# Patient Record
Sex: FEMALE | Race: WHITE | Employment: OTHER | URBAN - METROPOLITAN AREA
[De-identification: names, ages, dates, MRNs, and addresses within clinical notes are randomized per-mention and may not be internally consistent; named-entity substitution may affect disease eponyms.]

---

## 2017-12-18 PROBLEM — I95.1 ORTHOSTATIC HYPOTENSION: Status: ACTIVE | Noted: 2017-12-18

## 2017-12-19 PROBLEM — E11.42 DIABETIC POLYNEUROPATHY ASSOCIATED WITH TYPE 2 DIABETES MELLITUS (HCC): Status: ACTIVE | Noted: 2017-06-19

## 2017-12-19 PROBLEM — C44.92 CANCER, SKIN, SQUAMOUS CELL: Status: ACTIVE | Noted: 2017-12-19
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
## 2017-12-19 PROBLEM — E11.42 DIABETIC POLYNEUROPATHY ASSOCIATED WITH TYPE 2 DIABETES MELLITUS (HCC): Chronic | Status: ACTIVE | Noted: 2017-06-19

## 2017-12-19 PROBLEM — M50.30 DDD (DEGENERATIVE DISC DISEASE), CERVICAL: Status: ACTIVE | Noted: 2017-12-19
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
## 2017-12-19 PROBLEM — R55 SYNCOPE: Status: ACTIVE | Noted: 2017-12-19

## 2017-12-19 PROBLEM — J40 BRONCHITIS: Status: ACTIVE | Noted: 2017-12-19

## 2017-12-21 PROBLEM — I95.1 ORTHOSTATIC HYPOTENSION: Status: RESOLVED | Noted: 2017-12-18 | Resolved: 2017-12-21
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
## 2017-12-21 PROBLEM — J40 BRONCHITIS: Status: RESOLVED | Noted: 2017-12-19 | Resolved: 2017-12-21
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
## 2017-12-21 PROBLEM — R55 SYNCOPE: Status: RESOLVED | Noted: 2017-12-19 | Resolved: 2017-12-21

## 2022-07-14 ENCOUNTER — APPOINTMENT (OUTPATIENT)
Dept: CT IMAGING | Age: 87
DRG: 690 | End: 2022-07-14
Payer: MEDICARE

## 2022-07-14 ENCOUNTER — HOSPITAL ENCOUNTER (INPATIENT)
Age: 87
LOS: 3 days | Discharge: NURSING, REHAB OR LONG-TERM CARE | DRG: 690 | End: 2022-07-20
Attending: STUDENT IN AN ORGANIZED HEALTH CARE EDUCATION/TRAINING PROGRAM | Admitting: INTERNAL MEDICINE
Payer: MEDICARE

## 2022-07-14 DIAGNOSIS — E11.42 DIABETIC POLYNEUROPATHY ASSOCIATED WITH TYPE 2 DIABETES MELLITUS (HCC): Chronic | ICD-10-CM

## 2022-07-14 DIAGNOSIS — S09.90XA INJURY OF HEAD, INITIAL ENCOUNTER: Primary | ICD-10-CM

## 2022-07-14 LAB
ANION GAP SERPL CALCULATED.3IONS-SCNC: 11 MMOL/L (ref 3–16)
BASOPHILS ABSOLUTE: 0 K/UL (ref 0–0.2)
BASOPHILS RELATIVE PERCENT: 0.5 %
BUN BLDV-MCNC: 10 MG/DL (ref 7–20)
CALCIUM SERPL-MCNC: 10 MG/DL (ref 8.3–10.6)
CHLORIDE BLD-SCNC: 93 MMOL/L (ref 99–110)
CO2: 27 MMOL/L (ref 21–32)
CREAT SERPL-MCNC: <0.5 MG/DL (ref 0.6–1.2)
EOSINOPHILS ABSOLUTE: 0.1 K/UL (ref 0–0.6)
EOSINOPHILS RELATIVE PERCENT: 0.9 %
GFR AFRICAN AMERICAN: >60
GFR NON-AFRICAN AMERICAN: >60
GLUCOSE BLD-MCNC: 243 MG/DL (ref 70–99)
HCT VFR BLD CALC: 38.1 % (ref 36–48)
HEMOGLOBIN: 13.3 G/DL (ref 12–16)
LYMPHOCYTES ABSOLUTE: 1.2 K/UL (ref 1–5.1)
LYMPHOCYTES RELATIVE PERCENT: 19.2 %
MCH RBC QN AUTO: 32.8 PG (ref 26–34)
MCHC RBC AUTO-ENTMCNC: 34.9 G/DL (ref 31–36)
MCV RBC AUTO: 94.1 FL (ref 80–100)
MONOCYTES ABSOLUTE: 0.6 K/UL (ref 0–1.3)
MONOCYTES RELATIVE PERCENT: 9.7 %
NEUTROPHILS ABSOLUTE: 4.5 K/UL (ref 1.7–7.7)
NEUTROPHILS RELATIVE PERCENT: 69.7 %
PDW BLD-RTO: 13.6 % (ref 12.4–15.4)
PLATELET # BLD: 269 K/UL (ref 135–450)
PMV BLD AUTO: 7.9 FL (ref 5–10.5)
POTASSIUM REFLEX MAGNESIUM: 6 MMOL/L (ref 3.5–5.1)
RBC # BLD: 4.05 M/UL (ref 4–5.2)
SODIUM BLD-SCNC: 131 MMOL/L (ref 136–145)
WBC # BLD: 6.4 K/UL (ref 4–11)

## 2022-07-14 PROCEDURE — 70450 CT HEAD/BRAIN W/O DYE: CPT

## 2022-07-14 PROCEDURE — 81001 URINALYSIS AUTO W/SCOPE: CPT

## 2022-07-14 PROCEDURE — 93005 ELECTROCARDIOGRAM TRACING: CPT | Performed by: STUDENT IN AN ORGANIZED HEALTH CARE EDUCATION/TRAINING PROGRAM

## 2022-07-14 PROCEDURE — 90471 IMMUNIZATION ADMIN: CPT | Performed by: STUDENT IN AN ORGANIZED HEALTH CARE EDUCATION/TRAINING PROGRAM

## 2022-07-14 PROCEDURE — 6360000002 HC RX W HCPCS: Performed by: STUDENT IN AN ORGANIZED HEALTH CARE EDUCATION/TRAINING PROGRAM

## 2022-07-14 PROCEDURE — 85025 COMPLETE CBC W/AUTO DIFF WBC: CPT

## 2022-07-14 PROCEDURE — 72125 CT NECK SPINE W/O DYE: CPT

## 2022-07-14 PROCEDURE — 2500000003 HC RX 250 WO HCPCS: Performed by: STUDENT IN AN ORGANIZED HEALTH CARE EDUCATION/TRAINING PROGRAM

## 2022-07-14 PROCEDURE — 99285 EMERGENCY DEPT VISIT HI MDM: CPT

## 2022-07-14 PROCEDURE — 90715 TDAP VACCINE 7 YRS/> IM: CPT | Performed by: STUDENT IN AN ORGANIZED HEALTH CARE EDUCATION/TRAINING PROGRAM

## 2022-07-14 PROCEDURE — 80048 BASIC METABOLIC PNL TOTAL CA: CPT

## 2022-07-14 RX ADMIN — LIDOCAINE HYDROCHLORIDE 20 ML: 10; .005 INJECTION, SOLUTION EPIDURAL; INFILTRATION; INTRACAUDAL; PERINEURAL at 22:09

## 2022-07-14 RX ADMIN — TETANUS TOXOID, REDUCED DIPHTHERIA TOXOID AND ACELLULAR PERTUSSIS VACCINE, ADSORBED 0.5 ML: 5; 2.5; 8; 8; 2.5 SUSPENSION INTRAMUSCULAR at 22:09

## 2022-07-14 ASSESSMENT — PAIN - FUNCTIONAL ASSESSMENT: PAIN_FUNCTIONAL_ASSESSMENT: NONE - DENIES PAIN

## 2022-07-15 PROBLEM — N39.0 UTI (URINARY TRACT INFECTION): Status: ACTIVE | Noted: 2022-07-15

## 2022-07-15 LAB
A/G RATIO: 2.1 (ref 1.1–2.2)
ALBUMIN SERPL-MCNC: 3.7 G/DL (ref 3.4–5)
ALP BLD-CCNC: 80 U/L (ref 40–129)
ALT SERPL-CCNC: <5 U/L (ref 10–40)
ANION GAP SERPL CALCULATED.3IONS-SCNC: 10 MMOL/L (ref 3–16)
AST SERPL-CCNC: 9 U/L (ref 15–37)
BACTERIA: ABNORMAL /HPF
BASOPHILS ABSOLUTE: 0.1 K/UL (ref 0–0.2)
BASOPHILS RELATIVE PERCENT: 0.8 %
BILIRUB SERPL-MCNC: <0.2 MG/DL (ref 0–1)
BILIRUBIN URINE: NEGATIVE
BLOOD, URINE: NEGATIVE
BUN BLDV-MCNC: 11 MG/DL (ref 7–20)
CALCIUM SERPL-MCNC: 9.5 MG/DL (ref 8.3–10.6)
CHLORIDE BLD-SCNC: 98 MMOL/L (ref 99–110)
CLARITY: CLEAR
CO2: 27 MMOL/L (ref 21–32)
COLOR: YELLOW
CREAT SERPL-MCNC: <0.5 MG/DL (ref 0.6–1.2)
EKG ATRIAL RATE: 84 BPM
EKG DIAGNOSIS: NORMAL
EKG P AXIS: 71 DEGREES
EKG P-R INTERVAL: 212 MS
EKG Q-T INTERVAL: 366 MS
EKG QRS DURATION: 82 MS
EKG QTC CALCULATION (BAZETT): 432 MS
EKG R AXIS: 56 DEGREES
EKG T AXIS: 75 DEGREES
EKG VENTRICULAR RATE: 84 BPM
EOSINOPHILS ABSOLUTE: 0.1 K/UL (ref 0–0.6)
EOSINOPHILS RELATIVE PERCENT: 1.1 %
EPITHELIAL CELLS, UA: ABNORMAL /HPF (ref 0–5)
GFR AFRICAN AMERICAN: >60
GFR NON-AFRICAN AMERICAN: >60
GLUCOSE BLD-MCNC: 169 MG/DL (ref 70–99)
GLUCOSE URINE: NEGATIVE MG/DL
HCT VFR BLD CALC: 35.6 % (ref 36–48)
HEMOGLOBIN: 12.6 G/DL (ref 12–16)
KETONES, URINE: NEGATIVE MG/DL
LEUKOCYTE ESTERASE, URINE: ABNORMAL
LYMPHOCYTES ABSOLUTE: 1.8 K/UL (ref 1–5.1)
LYMPHOCYTES RELATIVE PERCENT: 20.2 %
MCH RBC QN AUTO: 32.9 PG (ref 26–34)
MCHC RBC AUTO-ENTMCNC: 35.2 G/DL (ref 31–36)
MCV RBC AUTO: 93.4 FL (ref 80–100)
MICROSCOPIC EXAMINATION: YES
MONOCYTES ABSOLUTE: 0.7 K/UL (ref 0–1.3)
MONOCYTES RELATIVE PERCENT: 8.2 %
NEUTROPHILS ABSOLUTE: 6.1 K/UL (ref 1.7–7.7)
NEUTROPHILS RELATIVE PERCENT: 69.7 %
NITRITE, URINE: NEGATIVE
PDW BLD-RTO: 13.1 % (ref 12.4–15.4)
PH UA: 6.5 (ref 5–8)
PLATELET # BLD: 226 K/UL (ref 135–450)
PMV BLD AUTO: 7.4 FL (ref 5–10.5)
POTASSIUM REFLEX MAGNESIUM: 4.4 MMOL/L (ref 3.5–5.1)
POTASSIUM REFLEX MAGNESIUM: 4.8 MMOL/L (ref 3.5–5.1)
PROTEIN UA: NEGATIVE MG/DL
RBC # BLD: 3.81 M/UL (ref 4–5.2)
RBC UA: ABNORMAL /HPF (ref 0–4)
RENAL EPITHELIAL, UA: ABNORMAL /HPF (ref 0–1)
SODIUM BLD-SCNC: 135 MMOL/L (ref 136–145)
SPECIFIC GRAVITY UA: 1.01 (ref 1–1.03)
TOTAL PROTEIN: 5.5 G/DL (ref 6.4–8.2)
URINE TYPE: ABNORMAL
UROBILINOGEN, URINE: 0.2 E.U./DL
WBC # BLD: 8.8 K/UL (ref 4–11)
WBC UA: >100 /HPF (ref 0–5)

## 2022-07-15 PROCEDURE — 97162 PT EVAL MOD COMPLEX 30 MIN: CPT

## 2022-07-15 PROCEDURE — G0378 HOSPITAL OBSERVATION PER HR: HCPCS

## 2022-07-15 PROCEDURE — 97116 GAIT TRAINING THERAPY: CPT

## 2022-07-15 PROCEDURE — 97530 THERAPEUTIC ACTIVITIES: CPT

## 2022-07-15 PROCEDURE — 2580000003 HC RX 258

## 2022-07-15 PROCEDURE — 6370000000 HC RX 637 (ALT 250 FOR IP): Performed by: INTERNAL MEDICINE

## 2022-07-15 PROCEDURE — 97535 SELF CARE MNGMENT TRAINING: CPT

## 2022-07-15 PROCEDURE — 2580000003 HC RX 258: Performed by: INTERNAL MEDICINE

## 2022-07-15 PROCEDURE — 6360000002 HC RX W HCPCS

## 2022-07-15 PROCEDURE — 97165 OT EVAL LOW COMPLEX 30 MIN: CPT

## 2022-07-15 PROCEDURE — 0HQ0XZZ REPAIR SCALP SKIN, EXTERNAL APPROACH: ICD-10-PCS | Performed by: STUDENT IN AN ORGANIZED HEALTH CARE EDUCATION/TRAINING PROGRAM

## 2022-07-15 PROCEDURE — 80053 COMPREHEN METABOLIC PANEL: CPT

## 2022-07-15 PROCEDURE — 12032 INTMD RPR S/A/T/EXT 2.6-7.5: CPT

## 2022-07-15 PROCEDURE — 36415 COLL VENOUS BLD VENIPUNCTURE: CPT

## 2022-07-15 PROCEDURE — 85025 COMPLETE CBC W/AUTO DIFF WBC: CPT

## 2022-07-15 PROCEDURE — 84132 ASSAY OF SERUM POTASSIUM: CPT

## 2022-07-15 RX ORDER — MAGNESIUM SULFATE IN WATER 40 MG/ML
2000 INJECTION, SOLUTION INTRAVENOUS PRN
Status: DISCONTINUED | OUTPATIENT
Start: 2022-07-15 | End: 2022-07-20 | Stop reason: HOSPADM

## 2022-07-15 RX ORDER — UBIDECARENONE 75 MG
1000 CAPSULE ORAL DAILY
Status: DISCONTINUED | OUTPATIENT
Start: 2022-07-15 | End: 2022-07-15 | Stop reason: SDUPTHER

## 2022-07-15 RX ORDER — ACETAMINOPHEN 650 MG/1
650 SUPPOSITORY RECTAL EVERY 6 HOURS PRN
Status: DISCONTINUED | OUTPATIENT
Start: 2022-07-15 | End: 2022-07-20 | Stop reason: HOSPADM

## 2022-07-15 RX ORDER — CYANOCOBALAMIN (VITAMIN B-12) 1000 MCG
1000 TABLET, EXTENDED RELEASE ORAL DAILY
Status: DISCONTINUED | OUTPATIENT
Start: 2022-07-15 | End: 2022-07-15 | Stop reason: RX

## 2022-07-15 RX ORDER — PROMETHAZINE HYDROCHLORIDE 25 MG/1
12.5 TABLET ORAL EVERY 6 HOURS PRN
Status: DISCONTINUED | OUTPATIENT
Start: 2022-07-15 | End: 2022-07-20 | Stop reason: HOSPADM

## 2022-07-15 RX ORDER — GABAPENTIN 100 MG/1
100 CAPSULE ORAL 3 TIMES DAILY
Status: DISCONTINUED | OUTPATIENT
Start: 2022-07-15 | End: 2022-07-20 | Stop reason: HOSPADM

## 2022-07-15 RX ORDER — CEFTRIAXONE 2 G/1
INJECTION, POWDER, FOR SOLUTION INTRAMUSCULAR; INTRAVENOUS
Status: COMPLETED
Start: 2022-07-15 | End: 2022-07-15

## 2022-07-15 RX ORDER — ROSUVASTATIN CALCIUM 10 MG/1
10 TABLET, COATED ORAL NIGHTLY
Status: DISCONTINUED | OUTPATIENT
Start: 2022-07-15 | End: 2022-07-20 | Stop reason: HOSPADM

## 2022-07-15 RX ORDER — TRAMADOL HYDROCHLORIDE 50 MG/1
25 TABLET ORAL EVERY 6 HOURS PRN
Status: DISCONTINUED | OUTPATIENT
Start: 2022-07-15 | End: 2022-07-20 | Stop reason: HOSPADM

## 2022-07-15 RX ORDER — LANOLIN ALCOHOL/MO/W.PET/CERES
1000 CREAM (GRAM) TOPICAL DAILY
Status: DISCONTINUED | OUTPATIENT
Start: 2022-07-15 | End: 2022-07-20 | Stop reason: HOSPADM

## 2022-07-15 RX ORDER — SODIUM CHLORIDE 0.9 % (FLUSH) 0.9 %
10 SYRINGE (ML) INJECTION PRN
Status: DISCONTINUED | OUTPATIENT
Start: 2022-07-15 | End: 2022-07-20 | Stop reason: HOSPADM

## 2022-07-15 RX ORDER — SODIUM CHLORIDE 0.9 % (FLUSH) 0.9 %
10 SYRINGE (ML) INJECTION EVERY 12 HOURS SCHEDULED
Status: DISCONTINUED | OUTPATIENT
Start: 2022-07-15 | End: 2022-07-20 | Stop reason: HOSPADM

## 2022-07-15 RX ORDER — POTASSIUM CHLORIDE 7.45 MG/ML
10 INJECTION INTRAVENOUS PRN
Status: DISCONTINUED | OUTPATIENT
Start: 2022-07-15 | End: 2022-07-20 | Stop reason: HOSPADM

## 2022-07-15 RX ORDER — SODIUM CHLORIDE 9 MG/ML
INJECTION, SOLUTION INTRAVENOUS PRN
Status: DISCONTINUED | OUTPATIENT
Start: 2022-07-15 | End: 2022-07-20 | Stop reason: HOSPADM

## 2022-07-15 RX ORDER — ONDANSETRON 2 MG/ML
4 INJECTION INTRAMUSCULAR; INTRAVENOUS EVERY 6 HOURS PRN
Status: DISCONTINUED | OUTPATIENT
Start: 2022-07-15 | End: 2022-07-20 | Stop reason: HOSPADM

## 2022-07-15 RX ORDER — ACETAMINOPHEN 325 MG/1
650 TABLET ORAL EVERY 6 HOURS PRN
Status: DISCONTINUED | OUTPATIENT
Start: 2022-07-15 | End: 2022-07-20 | Stop reason: HOSPADM

## 2022-07-15 RX ORDER — SODIUM CHLORIDE 9 MG/ML
INJECTION, SOLUTION INTRAVENOUS CONTINUOUS
Status: DISCONTINUED | OUTPATIENT
Start: 2022-07-15 | End: 2022-07-16

## 2022-07-15 RX ORDER — ASPIRIN 81 MG/1
81 TABLET, CHEWABLE ORAL DAILY
Status: DISCONTINUED | OUTPATIENT
Start: 2022-07-15 | End: 2022-07-20 | Stop reason: HOSPADM

## 2022-07-15 RX ADMIN — SODIUM CHLORIDE, PRESERVATIVE FREE 10 ML: 5 INJECTION INTRAVENOUS at 10:03

## 2022-07-15 RX ADMIN — CYANOCOBALAMIN TAB 1000 MCG 1000 MCG: 1000 TAB at 21:37

## 2022-07-15 RX ADMIN — CEFTRIAXONE SODIUM: 2 INJECTION, POWDER, FOR SOLUTION INTRAMUSCULAR; INTRAVENOUS at 03:24

## 2022-07-15 RX ADMIN — ASPIRIN 81 MG 81 MG: 81 TABLET ORAL at 21:37

## 2022-07-15 RX ADMIN — GABAPENTIN 100 MG: 100 CAPSULE ORAL at 18:33

## 2022-07-15 RX ADMIN — DEXTROSE MONOHYDRATE: 5 INJECTION INTRAVENOUS at 03:25

## 2022-07-15 RX ADMIN — ROSUVASTATIN CALCIUM 10 MG: 10 TABLET, FILM COATED ORAL at 21:38

## 2022-07-15 RX ADMIN — SODIUM CHLORIDE, PRESERVATIVE FREE 10 ML: 5 INJECTION INTRAVENOUS at 21:39

## 2022-07-15 RX ADMIN — GABAPENTIN 100 MG: 100 CAPSULE ORAL at 21:37

## 2022-07-15 RX ADMIN — SODIUM CHLORIDE: 9 INJECTION, SOLUTION INTRAVENOUS at 18:21

## 2022-07-15 ASSESSMENT — PAIN SCALES - GENERAL: PAINLEVEL_OUTOF10: 0

## 2022-07-15 NOTE — PROGRESS NOTES
Occupational Therapy  Facility/Department: [address] [place] [address] Initial Assessment & Tx    Name: Rhiannon Escobar  : 1925  MRN: [id number]  Date of Service: 7/15/2022    Discharge Recommendations: Rhiannon Escobar scored a 15/24 on the AM-PAC ADL Inpatient form. Current research shows that an AM-PAC score of 17 or less is typically not associated with a discharge to the patient's home setting. Based on the patient's AM-PAC score and their current ADL deficits, it is recommended that the patient have 3-5 sessions per week of Occupational Therapy at d/c to increase the patient's independence. Please see assessment section for further patient specific details. If patient discharges prior to next session this note will serve as a discharge summary. Please see below for the latest assessment towards goals. OT Equipment Recommendations  Equipment Needed: No       Patient Diagnosis(es): The encounter diagnosis was Injury of head, initial encounter. Past Medical History:  has a past medical history of Age-related macular degeneration, dry, Anemia B12 deficiency, Balance disorder, Cancer, skin, squamous cell, Chronic constipation, Colorectal polyps, Depression, Diverticulosis, Epistaxis, Hyperlipidemia, Hypertension, Low magnesium levels, Neuropathy, Osteoarthritis, Peripheral edema, Pruritis, Recurrent UTI, Small vessel disease, cerebrovascular, Tuberculosis, Type II or unspecified type diabetes mellitus without mention of complication, not stated as uncontrolled, Unspecified sleep apnea, and Urgency incontinence. Past Surgical History:  has a past surgical history that includes Cystocele repair; Rectocele repair; Total knee arthroplasty; and Hysterectomy. Treatment Diagnosis: impaired mobility, transfers, ADL      Assessment   Performance deficits / Impairments: Decreased functional mobility ; Decreased ADL status; Decreased endurance;Decreased cognition;Decreased safe awareness;Decreased balance;Decreased posture  Assessment: From home with son who assists her at baseline. Pt reporting falls recently 2/2 weakness. Pt admit with UTI and fall with head laceration. Pt requiring Eusebio with mobility this date using RW. Pt requiring max time and cues for all tasks, very slow moving, assist with sequencing and initiation. Pt requiring assist with ADLs. Would benefit from cont skilled OT while in acute care and inpt at dc. If dc home requires 24hr hands on physical assist and spvn with mobility and ADLs. Treatment Diagnosis: impaired mobility, transfers, ADL  Decision Making: Low Complexity  REQUIRES OT FOLLOW-UP: Yes  Activity Tolerance  Activity Tolerance: Patient limited by fatigue;Treatment limited secondary to decreased cognition;Patient Tolerated treatment well        Plan   Plan  Times per Week: 2-5  Times per Day: Daily     Restrictions  Position Activity Restriction  Other position/activity restrictions: up with assist    Subjective   General  Chart Reviewed: Yes  Additional Pertinent Hx: 80 y.o. female who presents after a fall. The patient reports that she was in the bathroom with her walker when she lost her balance and fell backward striking her head. past medical history of Age-related macular degeneration, dry, Anemia B12 deficiency, Balance disorder, Cancer, skin, squamous cell, Chronic constipation, Colorectal polyps, Depression, Diverticulosis, Epistaxis, Hyperlipidemia, Hypertension, Low magnesium levels, Neuropathy, Osteoarthritis, Peripheral edema, Pruritis, Recurrent UTI, Small vessel disease, cerebrovascular, Tuberculosis, Type II or unspecified type diabetes mellitus without mention of complication, not stated as uncontrolled, Unspecified sleep apnea, and Urgency incontinence. Referring Practitioner: Sam Jones DO  Diagnosis: UTI  Subjective  Subjective:  In bed agreeable to session, reporting no pain     Social/Functional History  Social/Functional History  Lives With: Son  Type of Home: Condo  Home Layout: Multi-level, Bed/Bath upstairs, Able to Live on Main level with bedroom/bathroom (1 step down into living room)  Home Access: Stairs to enter with rails  Entrance Stairs - Number of Steps: 3  Bathroom Shower/Tub: Walk-in shower  Bathroom Equipment: 3-in-1 commode, Shower chair, Grab bars in shower  Home Equipment: Frederich Gu, 4 wheeled  Has the patient had two or more falls in the past year or any fall with injury in the past year?: Yes  Receives Help From: Family  ADL Assistance: Independent  Homemaking Assistance: Needs assistance (son takes care of everything)  Ambulation Assistance: Independent (with rollator, short distances)  Transfer Assistance: Independent  Active : No  Additional Comments: Son with her all the time - has had stroke in past but able to help her as needed                  Safety Devices  Type of Devices: All fall risk precautions in place;Call light within reach; Chair alarm in place;Gait belt;Patient at risk for falls; Left in chair;Nurse notified  Bed Mobility Training  Bed Mobility Training: Yes  Supine to Sit: Contact-guard assistance  Sit to Supine:  (left in chair end of session)  Balance  Sitting: With support  Standing: With support  Transfer Training  Transfer Training: Yes  Sit to Stand: Minimum assistance  Stand to Sit: Minimum assistance  Toilet Transfer: Minimum assistance  Gait  Overall Level of Assistance: Minimum assistance (all movement very slow and effortful ++ time and cues)  Assistive Device: Walker, rolling     AROM: Generally decreased, functional (BUE)  Strength: Generally decreased, functional (BUE)  ADL  Grooming: Setup;Stand by assistance;Minimal assistance (seated SBA- wipe face, hands, stance at sink wash hands with Eusebio)  LE Dressing: Maximum assistance (socks, brief change)  Toileting:  Moderate assistance                 Cognition  Overall Cognitive Status: Exceptions  Arousal/Alertness: Delayed responses to stimuli; Appropriate responses to stimuli  Following Commands: Follows one step commands with increased time; Follows one step commands with repetition  Attention Span: Attends with cues to redirect; Difficulty dividing attention; Difficulty attending to directions  Insights: Decreased awareness of deficits  Initiation: Requires cues for some  Sequencing: Requires cues for some  Orientation  Overall Orientation Status: Within Functional Limits                  Education Given To: Patient  Education Provided: Role of Therapy;Plan of Care;ADL Adaptive Strategies;Transfer Training  Education Method: Verbal  Barriers to Learning: Cognition  Education Outcome: Continued education needed                          AM-PAC Score        AM-PAC Inpatient Daily Activity Raw Score: 15 (07/15/22 1206)  AM-PAC Inpatient ADL T-Scale Score : 34.69 (07/15/22 1206)  ADL Inpatient CMS 0-100% Score: 56.46 (07/15/22 1206)  ADL Inpatient CMS G-Code Modifier : CK (07/15/22 1206)    Goals  Short Term Goals  Time Frame for Short term goals: dc  Short Term Goal 1: supine to sit with SPVN  Short Term Goal 2: sit<>stand fx transfers SBA  Short Term Goal 3: Fx mobility SBA  Short Term Goal 4: UB/LB dressing SBA  Short Term Goal 5: grooming SBA in stance at sink 2 min       Therapy Time   Individual Concurrent Group Co-treatment   Time In 0815         Time Out 0853         Minutes 38             Timed Code Treatment Minutes:   23    Total Treatment Minutes:  [address], OT

## 2022-07-15 NOTE — PROGRESS NOTES
Physical Therapy  Facility/Department: [address] [place] [address] [address] [facility]  Physical Therapy Initial Assessment and Treatment    Name: Josefa Sotelo  : 1925  MRN: [id number]  Date of Service: 7/15/2022    Discharge Recommendations:    Josefa Sotelo scored a 16/24 on the AM-PAC short mobility form. Current research shows that an AM-PAC score of 17 or less is typically not associated with a discharge to the patient's home setting. Based on the patient's AM-PAC score and their current functional mobility deficits, it is recommended that the patient have 3-5 sessions per week of Physical Therapy at d/c to increase the patient's independence. Please see assessment section for further patient specific details. If patient discharges prior to next session this note will serve as a discharge summary. Please see below for the latest assessment towards goals. PT Equipment Recommendations  Equipment Needed: No      Patient Diagnosis(es): The encounter diagnosis was Injury of head, initial encounter. Past Medical History:  has a past medical history of Age-related macular degeneration, dry, Anemia B12 deficiency, Balance disorder, Cancer, skin, squamous cell, Chronic constipation, Colorectal polyps, Depression, Diverticulosis, Epistaxis, Hyperlipidemia, Hypertension, Low magnesium levels, Neuropathy, Osteoarthritis, Peripheral edema, Pruritis, Recurrent UTI, Small vessel disease, cerebrovascular, Tuberculosis, Type II or unspecified type diabetes mellitus without mention of complication, not stated as uncontrolled, Unspecified sleep apnea, and Urgency incontinence. Past Surgical History:  has a past surgical history that includes Cystocele repair; Rectocele repair; Total knee arthroplasty; and Hysterectomy. Assessment   Body Structures, Functions, Activity Limitations Requiring Skilled Therapeutic Intervention: Decreased functional mobility ; Decreased strength;Decreased endurance  Assessment: Pt with decreased independent mobility from baseline. Pt reports normally independent with mobility with rollator. Currently needing min assist for transfers/gt with walker. Limited activity tolerance and fatigues quickly. At risk for falls and safety concerns for pt returning home. Has steps to enter home and bed/bath on second floor. Pt would benefit from cont skilled PT to maximize mobiltiy and independence. Treatment Diagnosis: impaired functional mobility 2/2 decreased strength and endurnace  Decision Making: Medium Complexity  Requires PT Follow-Up: Yes     Plan   Plan  Plan:  (2-5)  Current Treatment Recommendations: Strengthening, Functional mobility training, Gait training, Stair training, Patient/Caregiver education & training, Safety education & training  Safety Devices  Type of Devices: All fall risk precautions in place, Call light within reach, Chair alarm in place, Gait belt, Patient at risk for falls, Left in chair, Nurse notified     Restrictions  Position Activity Restriction  Other position/activity restrictions: up with assist     Subjective   General  Chart Reviewed: Yes  Additional Pertinent Hx: fall with head laceration. Head CT: neg.  CT Cspine: neg. Referring Practitioner: Ramakrishna Francisco DO  Referral Date : 07/15/22  Subjective  Subjective: Pt found supine. Agreeable to PT. \"It's hard to hold my head up. \"         Social/Functional History  Social/Functional History  Lives With: Son  Type of Home: Condo  Home Layout: Multi-level, Bed/Bath upstairs, Able to Live on Main level with bedroom/bathroom (1 step down into living room)  Home Access: Stairs to enter with rails  Entrance Stairs - Number of Steps: 3  Bathroom Shower/Tub: Walk-in shower  Bathroom Equipment: 3-in-1 commode  Home Equipment: Elverna Corti, 4 wheeled  Has the patient had two or more falls in the past year or any fall with injury in the past year?: Yes  Receives Help From: Family  ADL Assistance: [address]: Needs assistance (son takes care of everything)  Ambulation Assistance: Independent (with rollator, short distances)  Transfer Assistance: Independent  Active : No  Additional Comments: Son with her all the time - has had stroke in past but able to help her as needed  Vision/Hearing  Vision  Vision: Within Functional Limits  Hearing  Hearing: Exceptions to [facility]  Hearing Exceptions: Hard of hearing/hearing concerns    Cognition   Orientation  Overall Orientation Status: Within Functional Limits  Cognition  Overall Cognitive Status: Exceptions  Arousal/Alertness: Delayed responses to stimuli; Appropriate responses to stimuli  Following Commands: Follows one step commands with increased time; Follows one step commands with repetition  Attention Span: Attends with cues to redirect; Difficulty dividing attention; Difficulty attending to directions  Insights: Decreased awareness of deficits  Initiation: Requires cues for some  Sequencing: Requires cues for some     Objective                 AROM RLE (degrees)  RLE AROM: WFL  AROM LLE (degrees)  LLE AROM : WFL  Strength RLE  Strength RLE: WFL  Strength LLE  Strength LLE: WFL          Bed mobility  Supine to Sit: Stand by assistance  Transfers  Sit to Stand: Minimal Assistance  Stand to sit: Minimal Assistance  Ambulation  Device: Rolling Walker  Assistance: Minimal assistance  Quality of Gait: trunk flexed, decreased bilat step length/height, slow and effortful  Distance: 10', 12'  Comments: Fatigues quickly with activity         Treatment included: bed mobility, transfers, gt, pt education          OutComes Score                                                  AM-PAC Score  AM-PAC Inpatient Mobility Raw Score : 16 (07/15/22 1207)  AM-PAC Inpatient T-Scale Score : 40.78 (07/15/22 1207)  Mobility Inpatient CMS 0-100% Score: 54.16 (07/15/22 1207)  Mobility Inpatient CMS G-Code Modifier : CK (07/15/22 1207)          Goals  Short Term Goals  Time Frame for Short term goals:  By discharge  Short term goal 1: Sup to sit supervision  Short term goal 2: Pt will transfer sit to stand supervision  Short term goal 3: Pt will amb >80' with RW supervision  Short term goal 4: Pt will up/down 3 steps with rail and LRAD SBA (if going home)       Education  Patient Education  Education Given To: Patient  Education Provided: Role of Therapy;Plan of Care  Education Method: Verbal  Education Outcome: Verbalized understanding;Continued education needed      Therapy Time   Individual Concurrent Group Co-treatment   Time In 0815         Time Out 0853         Minutes 38               Timed Code Treatment Minutes:  23     Total Treatment Minutes:  [address], PT

## 2022-07-15 NOTE — ED PROVIDER NOTES
ED Attending Attestation Note     Date of evaluation: 7/14/2022    This patient was seen by the resident. I have seen and examined the patient, agree with the workup, evaluation, management and diagnosis. The care plan has been discussed. I have reviewed the ECG and concur with the resident's interpretation. My assessment reveals woman with a mechanical fall related to lack of balance at home. She has a known history of neuropathy. Her son was very very nearby and essentially witnessed the fall and reports that there was no loss of consciousness. He has a head laceration that was still bleeding which prompted the presentation here. EMS obtained a glucose which was elevated, but she does not routinely check her glucose. Her son reports that she had eye prescription shortly before coming here. General:  WD WN. No acute distress. Non-toxic appearing    HEENT: Head with laceration (possibly multiple or stellate) with thick clot overlying to L pareital region without depression, no Rodriguez's sign or Raccoon eyes, pupils equal round and reactive to light, EOMI, sclera clear, no facial tenderness to palpation or step offs, no midface instability, no hemotympanum bilaterally, mucus membranes moist, no trismus, no jaw malocclusion, oropharynx WNL, no septal hematoma    Neck:  Supple. Full ROM without pain. Pulmonary:   Non-labored breathing. Breath sounds clear bilaterally. Cardiac:  Regular rate and rhythm. No murmurs. Abdomen:  Soft. Non-tender throughotu. Non-distended. No masses. Musculoskeletal: No obvious deformities, no tenderness to palpation to extremities,  full neck ROM without pain, full ROM in all extremities with no pain including B/l knees. Negative logroll bilaterally for legs. Vascular:  Extremities warm and perfused. Radial pulses 2+ bilaterally. Skin:  No rash. No abrasions    Neuro: Awake and conversant, following commands, GCS15 CN 2-12 intact. Sensation intact. Strength grossly equal and symmetric. Extremities:  No peripheral edema.  B/l symmetric    Plan for cross-sectional imaging of head/neck based on mechanism, age  Will update Tdap  Mechanical fall but given elevated glucose will evaluate for evidence of complication of hyperglycemia- however suspect possibly due to recent diet          Anthony Blanchard MD  07/14/22 8235

## 2022-07-15 NOTE — H&P
Hospital Medicine      History & Physical        Reason for admission/ Chief Complaint:Fall with scalp laceration    History Obtained From:  patient,son    HISTORY OF PRESENT ILLNESS:    The patient is a 80 y.o. female  with hx of DM2  complicated by neuropathy who presents after a fall with a head laceration  -Reports losing her balance when in the bathroom and falling backwards,hitting her head  No associated loss of consciousness per son who was in the house at the same time.  -has had occasional falls as her legs just give way. No fevers or chills. No nausea,vomiting or diarrhea  Lives with her son    In the ED,she was noted to have a large scalp laceration requiring staples  Urine also has pyuria and she admits dysuria. She was started on antibiotics for UTI      I called son-for further hx. Has had recurrent UTI and was recently treated about 10 days ago  ago.  -has had few falls in the past and since this year,3-4 times.  -ambulates with a walker/rollator  -able to perform most ADLs. ED Triage Vitals   Enc Vitals Group      BP 07/14/22 2058 136/78      Heart Rate 07/14/22 2058 87      Resp 07/14/22 2058 14      Temp 07/14/22 2058 98.4 °F (36.9 °C)      Temp Source 07/14/22 2058 Oral      SpO2 07/14/22 2058 98 %      Weight 07/14/22 2058 114 lb 14.4 oz (52.1 kg)      Height 07/15/22 0715 5' 4\" (1.626 m)      Head Circumference --       Peak Flow --       Pain Score --       Pain Loc --       Pain Edu? --       Excl.  in [address]? --      Medications   cefTRIAXone (ROCEPHIN) 2000 mg IVPB in D5W 50ml minibag ( IntraVENous Canceled Entry 7/15/22 0104)   sodium chloride flush 0.9 % injection 10 mL (has no administration in time range)   sodium chloride flush 0.9 % injection 10 mL (has no administration in time range)   0.9 % sodium chloride infusion (has no administration in time range)   potassium chloride 10 mEq/100 mL IVPB (Peripheral Line) (has no administration in time range)   magnesium sulfate 2000 mg in 50 mL IVPB premix (has no administration in time range)   promethazine (PHENERGAN) tablet 12.5 mg (has no administration in time range)     Or   ondansetron (ZOFRAN) injection 4 mg (has no administration in time range)   acetaminophen (TYLENOL) tablet 650 mg (has no administration in time range)     Or   acetaminophen (TYLENOL) suppository 650 mg (has no administration in time range)   cefTRIAXone (ROCEPHIN) 1000 mg IVPB in 50 mL D5W minibag (has no administration in time range)   lidocaine-EPINEPHrine 1 percent-1:214698 injection 20 mL (20 mLs IntraDERmal Given 7/14/22 2209)   Tetanus-Diphth-Acell Pertussis (BOOSTRIX) injection 0.5 mL (0.5 mLs IntraMUSCular Given 7/14/22 2209)   cefTRIAXone (ROCEPHIN) 2 g injection (  Given 7/15/22 0324)   dextrose 5 % infusion (  New Bag 7/15/22 3454)         Past Medical History:        Diagnosis Date    Age-related macular degeneration, dry     Anemia B12 deficiency     Balance disorder     Cancer, skin, squamous cell 12/10    right lower leg x 2 - having these surgically removed.     Chronic constipation     Colorectal polyps     Depression     Diverticulosis     Epistaxis     recurrent     Hyperlipidemia     Hypertension     Low magnesium levels     Neuropathy     peripheral - Dr. Jonna Jones     Peripheral edema     Pruritis     Dr. Dixon Love    Recurrent UTI     Small vessel disease, cerebrovascular 3/2013    MRI    Tuberculosis     as a child    Type II or unspecified type diabetes mellitus without mention of complication, not stated as uncontrolled     Unspecified sleep apnea     CPAP - stopped using this in 2012    Urgency incontinence        Past Surgical History:        Procedure Laterality Date    CYSTOCELE REPAIR      HYSTERECTOMY (CERVIX STATUS UNKNOWN)      ovaries intact    RECTOCELE REPAIR      TOTAL KNEE ARTHROPLASTY         Medications Prior to Admission:    Medications Prior to Admission: gabapentin (NEURONTIN) 400 MG capsule, Take 1 capsule by mouth every evening  guaiFENesin (MUCINEX) 600 MG extended release tablet, Take 1 tablet by mouth 2 times daily as needed for Congestion  traMADol (ULTRAM) 50 MG tablet, Take 1 tablet by mouth every 8 hours as needed for Pain . aspirin 81 MG chewable tablet, Take 81 mg by mouth daily  (Patient not taking: Reported on 7/14/2022)  Biotin 10 MG CAPS, Take by mouth  cyanocobalamin ([employer] VITAMIN B12) 1000 MCG tablet, Place 5,000 mcg under the tongue  Omega-3 Fatty Acids (FISH OIL OMEGA-3 PO), Take 1 tablet by mouth daily   glucose blood VI test strips (ASCENSIA AUTODISC VI;ONE TOUCH ULTRA TEST VI) strip, 1 strip  metFORMIN (GLUCOPHAGE) 1000 MG tablet, Take 1,000 mg by mouth  rosuvastatin (CRESTOR) 10 MG tablet, Take 10 mg by mouth  MAGNESIUM PO, Take 1 tablet by mouth daily     Allergies:  Lipitor, Cymbalta [duloxetine hcl], and Pregabalin    Social History:   TOBACCO:   reports that she has never smoked. She has never used smokeless tobacco.  ETOH:   reports current alcohol use. Patient currently lives with family     Family History:   History reviewed. No pertinent family history. REVIEW OF SYSTEMS:  10 point ROS obtained, as per HPI, otherwise NEG    PHYSICAL EXAM:  BP (!) 144/76   Pulse 74   Temp 97.8 °F (36.6 °C) (Oral)   Resp 16   Ht 5' 4\" (1.626 m)   Wt 107 lb 2.3 oz (48.6 kg)   SpO2 96%   BMI 18.39 kg/m²   General appearance: alert, cooperative and appears in no distress  Head: Normocephalic, without obvious abnormality,  left parieal stapled laceration  Eyes: conjunctivae/corneas clear, EOM's intact. Neck: no JVD, supple,   Lungs: clear to auscultation bilaterally  Heart: regular rate and rhythm, S1, S2 normal, no murmur, regular rate and rhythm   Abdomen:soft, NT,ND, BS+  Extremities: no edema,mild tenderness BLE on palpation  Skin: Skin color, texture, turgor normal. No rashes or lesions  Neurologic: Alert and oriented X 3, normal strength and tone.       DATA:    CBC with Differential:    Lab Results Component Value Date/Time    WBC 8.8 07/15/2022 06:27 AM    RBC 3.81 07/15/2022 06:27 AM    HGB 12.6 07/15/2022 06:27 AM    HCT 35.6 07/15/2022 06:27 AM     07/15/2022 06:27 AM    MCV 93.4 07/15/2022 06:27 AM    SEGSPCT 61.5 11/17/2011 10:25 AM    LYMPHOPCT 20.2 07/15/2022 06:27 AM    EOSPCT 2.0 11/17/2011 10:25 AM    DIFFTYPE Auto-K 11/17/2011 10:25 AM     BMP:    Lab Results   Component Value Date/Time     07/15/2022 06:27 AM    K 4.4 07/15/2022 06:27 AM    CL 98 07/15/2022 06:27 AM    CO2 27 07/15/2022 06:27 AM    BUN 11 07/15/2022 06:27 AM    CREATININE <0.5 07/15/2022 06:27 AM    CALCIUM 9.5 07/15/2022 06:27 AM    GFRAA >60 07/15/2022 06:27 AM    GFRAA >60 05/29/2013 09:24 AM    LABGLOM >60 07/15/2022 06:27 AM    GLUCOSE 169 07/15/2022 06:27 AM            ASSESSMENT / PLAN:     Falls-recurrent  Likely sec to DM neuropathy/UTI  -on neurontin  Head injury following fall-required staples in the ED to left  parietal region  Echo  Tele monitor    UTI  Urine culture  IVF X 1 liter      Scalp laceration  Head with laceration (possibly multiple or stellate) with thick clot overlying to L pareital region without depression. Wound care  Pain control     Type 2 diabetes mellitus  complicated by Diabetic neuropathy    -accuchecks qac and hs  On metformin  Add ensure nutritional supplement    Gait/Balance disorder  -associated falls  PT/OT        Constipation, chronic  Daily miralax    Hyperlipidemia  On crestor       Dvt prophy-lovenox    Disposition:pending  urine culture. Called and discussed with mko-Bornap[facility]-gr lives in pt's home. Would prefer dc to home but open to discussion. ____________________________  TORSTEN Barriga MD  Hospitalist Service

## 2022-07-15 NOTE — PROGRESS NOTES
Admitted patient to room 3312 from ED with dx: fall with head laceration. Arrived to unit via stretcher with all belongings. No family present at this time. Vitals:    07/15/22 0554   BP: (!) 144/76   Pulse: 74   Resp: 16   Temp: 97.8 °F (36.6 °C)   SpO2: 96%       Isolation:  No active isolations     GEN: Patient is alert & oriented, speech clear and appropriate. She is Hard of hearing. Her hearing aids and glasses are in her purse. Pain: Denies pain. IV:    IV site clean dry and intact without redness or pain. CV: HRRRR. No edema. Palpable pulses. Lungs: Respirations easy, unlabored without cough. On Room Air, denies SOB. Lungs clear. Encouraged C&DB. GI/: No complaints of nausea/vomiting/diarrhea. Tolerating diet. Abdomen soft, non tender, with bowel sounds. Incontinent bladder. Urine output clear yellow, denies dysuria. Skin: Warm and dry, fragile. LDAs for blister on left buttock, 10 staples on scalp, skin tear left upper arm. Preventative dressings placed on spine, buttocks, heals and elbows. Mobility: Up as tolerated with assist/walker. Safety: Oriented to room, call light, tv, phone and dietary services. Bed in lowest position and locked. Exit alarms in place. Non slip socks on. ID bracelet on and correct per patient verbally reporting name and date of birth. Plan of care and safety measures reviewed with the patient. Call light and needed items in reach. Disposition: Currently lives in [facility] with Son. Per patient she is able to perform her own ADLS.

## 2022-07-15 NOTE — ED PROVIDER NOTES
[id number] [facility] RESIDENT NOTE       Date of evaluation: 7/14/2022    Chief Complaint     Fall and Head Injury      of Present Illness     Sariah Garcia is a 80 y.o. female who presents after a fall. The patient reports that she was in the bathroom with her walker when she lost her balance and fell backward striking her head. Denies any loss of consciousness. The patient denies any preceding chest pain, shortness of breath. She denies any syncope. She states that sometimes her legs simply get weak and cause her to fall. The patient denies a headache, denies any nausea or vomiting. On examination, she is noted to have a significant amount of blood draining through a dressing. Review of Systems     A comprehensive 10 system review of systems was completed and negative unless otherwise stated above. Past Medical, Surgical, Family, and Social History     She has a past medical history of Age-related macular degeneration, dry, Anemia B12 deficiency, Balance disorder, Cancer, skin, squamous cell, Chronic constipation, Colorectal polyps, Depression, Diverticulosis, Epistaxis, Hyperlipidemia, Hypertension, Low magnesium levels, Neuropathy, Osteoarthritis, Peripheral edema, Pruritis, Recurrent UTI, Small vessel disease, cerebrovascular, Tuberculosis, Type II or unspecified type diabetes mellitus without mention of complication, not stated as uncontrolled, Unspecified sleep apnea, and Urgency incontinence. She has a past surgical history that includes Cystocele repair; Rectocele repair; Total knee arthroplasty; and Hysterectomy. Her family history is not on file. She reports that she has never smoked. She has never used smokeless tobacco. She reports current alcohol use. She reports that she does not use drugs.     Medications     Current Discharge Medication List        CONTINUE these medications which have NOT CHANGED    Details   gabapentin (NEURONTIN) 400 MG capsule Take 1 capsule by mouth every evening  Qty: 90 capsule, Refills: 3      guaiFENesin (MUCINEX) 600 MG extended release tablet Take 1 tablet by mouth 2 times daily as needed for Congestion  Qty: 60 tablet, Refills: 0      traMADol (ULTRAM) 50 MG tablet Take 1 tablet by mouth every 8 hours as needed for Pain . Qty: 30 tablet, Refills: 0      aspirin 81 MG chewable tablet Take 81 mg by mouth daily       Biotin 10 MG CAPS Take by mouth      cyanocobalamin ([employer] VITAMIN B12) 1000 MCG tablet Place 5,000 mcg under the tongue      Omega-3 Fatty Acids (FISH OIL OMEGA-3 PO) Take 1 tablet by mouth daily       glucose blood VI test strips (ASCENSIA AUTODISC VI;ONE TOUCH ULTRA TEST VI) strip 1 strip      metFORMIN (GLUCOPHAGE) 1000 MG tablet Take 1,000 mg by mouth      rosuvastatin (CRESTOR) 10 MG tablet Take 10 mg by mouth      MAGNESIUM PO Take 1 tablet by mouth daily              Allergies     She is allergic to lipitor, cymbalta [duloxetine hcl], and pregabalin. Physical Exam     INITIAL VITALS: BP: 136/78, Temp: 98.4 °F (36.9 °C), Heart Rate: 87, Resp: 14, SpO2: 98 %   Physical Exam  Constitutional:       General: She is not in acute distress. Appearance: She is not ill-appearing or toxic-appearing. HENT:      Head: Normocephalic. Comments: There is a large amount of dried blood. A 2-1/2 inch straight laceration noted to the left occiput, hemostatic. Nose: Nose normal.      Mouth/Throat:      Mouth: Mucous membranes are moist.   Eyes:      Extraocular Movements: Extraocular movements intact. Pupils: Pupils are equal, round, and reactive to light. Cardiovascular:      Rate and Rhythm: Normal rate and regular rhythm. Pulses: Normal pulses. Heart sounds: Normal heart sounds. Pulmonary:      Effort: Pulmonary effort is normal.      Breath sounds: Normal breath sounds. Abdominal:      General: Abdomen is flat. Palpations: Abdomen is soft.    Musculoskeletal: General: Normal range of motion. Cervical back: Normal range of motion and neck supple. Skin:     General: Skin is warm and dry. Capillary Refill: Capillary refill takes less than 2 seconds. Neurological:      General: No focal deficit present. Mental Status: She is alert and oriented to person, place, and time. Mental status is at baseline. Psychiatric:         Mood and Affect: Mood normal.         Behavior: Behavior normal.         DiagnosticResults     EKG   Interpreted in conjunction with emergencydepartment physician Francine Neff MD    Normal sinus rhythm with first-degree AV block. Normal intervals other than ID interval 212 ms. Normal axis. There is no ST segment ovation or depression noted. Stable compared to prior on 12/18/2017 with first-degree AV block being new. RADIOLOGY:  CT CERVICAL SPINE WO CONTRAST   Final Result      1. No acute traumatic abnormality. 2. Cervical spondylosis as described. 3. 2.3 cm left thyroid lesion. Recommend follow-up thyroid ultrasound. CT Head WO Contrast   Final Result      1. No acute intracranial hemorrhage or mass effect. 2. Severe white matter disease. 3. Mild cortical atrophy.           LABS:   Results for orders placed or performed during the hospital encounter of 07/14/22   CBC with Auto Differential   Result Value Ref Range    WBC 6.4 4.0 - 11.0 K/uL    RBC 4.05 4.00 - 5.20 M/uL    Hemoglobin 13.3 12.0 - 16.0 g/dL    Hematocrit 38.1 36.0 - 48.0 %    MCV 94.1 80.0 - 100.0 fL    MCH 32.8 26.0 - 34.0 pg    MCHC 34.9 31.0 - 36.0 g/dL    RDW 13.6 12.4 - 15.4 %    Platelets 008 408 - 476 K/uL    MPV 7.9 5.0 - 10.5 fL    Neutrophils % 69.7 %    Lymphocytes % 19.2 %    Monocytes % 9.7 %    Eosinophils % 0.9 %    Basophils % 0.5 %    Neutrophils Absolute 4.5 1.7 - 7.7 K/uL    Lymphocytes Absolute 1.2 1.0 - 5.1 K/uL    Monocytes Absolute 0.6 0.0 - 1.3 K/uL    Eosinophils Absolute 0.1 0.0 - 0.6 K/uL    Basophils Absolute 0.0 0.0 - 0.2 K/uL   Basic Metabolic Panel w/ Reflex to MG   Result Value Ref Range    Sodium 131 (L) 136 - 145 mmol/L    Potassium reflex Magnesium 6.0 (HH) 3.5 - 5.1 mmol/L    Chloride 93 (L) 99 - 110 mmol/L    CO2 27 21 - 32 mmol/L    Anion Gap 11 3 - 16    Glucose 243 (H) 70 - 99 mg/dL    BUN 10 7 - 20 mg/dL    CREATININE <0.5 (L) 0.6 - 1.2 mg/dL    GFR Non-African American >60 >60    GFR African American >60 >60    Calcium 10.0 8.3 - 10.6 mg/dL   Urinalysis with Microscopic   Result Value Ref Range    Color, UA Yellow Straw/Yellow    Clarity, UA Clear Clear    Glucose, Ur Negative Negative mg/dL    Bilirubin Urine Negative Negative    Ketones, Urine Negative Negative mg/dL    Specific Gravity, UA 1.010 1.005 - 1.030    Blood, Urine Negative Negative    pH, UA 6.5 5.0 - 8.0    Protein, UA Negative Negative mg/dL    Urobilinogen, Urine 0.2 <2.0 E.U./dL    Nitrite, Urine Negative Negative    Leukocyte Esterase, Urine LARGE (A) Negative    Microscopic Examination YES     Urine Type Voided     WBC, UA >100 (A) 0 - 5 /HPF    RBC, UA 0-2 0 - 4 /HPF    Epithelial Cells, UA 2-5 0 - 5 /HPF    Renal Epithelial, UA 2-5 (A) 0 - 1 /HPF    Bacteria, UA Rare (A) None Seen /HPF   Potassium w/ Reflex to Magnesium   Result Value Ref Range    Potassium reflex Magnesium 4.8 3.5 - 5.1 mmol/L   CBC auto differential   Result Value Ref Range    WBC 8.8 4.0 - 11.0 K/uL    RBC 3.81 (L) 4.00 - 5.20 M/uL    Hemoglobin 12.6 12.0 - 16.0 g/dL    Hematocrit 35.6 (L) 36.0 - 48.0 %    MCV 93.4 80.0 - 100.0 fL    MCH 32.9 26.0 - 34.0 pg    MCHC 35.2 31.0 - 36.0 g/dL    RDW 13.1 12.4 - 15.4 %    Platelets 247 696 - 206 K/uL    MPV 7.4 5.0 - 10.5 fL    Neutrophils % 69.7 %    Lymphocytes % 20.2 %    Monocytes % 8.2 %    Eosinophils % 1.1 %    Basophils % 0.8 %    Neutrophils Absolute 6.1 1.7 - 7.7 K/uL    Lymphocytes Absolute 1.8 1.0 - 5.1 K/uL    Monocytes Absolute 0.7 0.0 - 1.3 K/uL    Eosinophils Absolute 0.1 0.0 - 0.6 K/uL    Basophils Absolute 0.1 0.0 - 0.2 K/uL   Comprehensive Metabolic Panel w/ Reflex to MG   Result Value Ref Range    Sodium 135 (L) 136 - 145 mmol/L    Potassium reflex Magnesium 4.4 3.5 - 5.1 mmol/L    Chloride 98 (L) 99 - 110 mmol/L    CO2 27 21 - 32 mmol/L    Anion Gap 10 3 - 16    Glucose 169 (H) 70 - 99 mg/dL    BUN 11 7 - 20 mg/dL    CREATININE <0.5 (L) 0.6 - 1.2 mg/dL    GFR Non-African American >60 >60    GFR African American >60 >60    Calcium 9.5 8.3 - 10.6 mg/dL    Total Protein 5.5 (L) 6.4 - 8.2 g/dL    Albumin 3.7 3.4 - 5.0 g/dL    Albumin/Globulin Ratio 2.1 1.1 - 2.2    Total Bilirubin <0.2 0.0 - 1.0 mg/dL    Alkaline Phosphatase 80 40 - 129 U/L    ALT <5 (L) 10 - 40 U/L    AST 9 (L) 15 - 37 U/L   CBC auto differential   Result Value Ref Range    WBC 5.6 4.0 - 11.0 K/uL    RBC 3.72 (L) 4.00 - 5.20 M/uL    Hemoglobin 12.2 12.0 - 16.0 g/dL    Hematocrit 34.6 (L) 36.0 - 48.0 %    MCV 93.0 80.0 - 100.0 fL    MCH 32.9 26.0 - 34.0 pg    MCHC 35.4 31.0 - 36.0 g/dL    RDW 13.0 12.4 - 15.4 %    Platelets 652 542 - 294 K/uL    MPV 7.0 5.0 - 10.5 fL    Neutrophils % 65.2 %    Lymphocytes % 21.9 %    Monocytes % 10.5 %    Eosinophils % 1.5 %    Basophils % 0.9 %    Neutrophils Absolute 3.6 1.7 - 7.7 K/uL    Lymphocytes Absolute 1.2 1.0 - 5.1 K/uL    Monocytes Absolute 0.6 0.0 - 1.3 K/uL    Eosinophils Absolute 0.1 0.0 - 0.6 K/uL    Basophils Absolute 0.1 0.0 - 0.2 K/uL   Comprehensive Metabolic Panel w/ Reflex to MG   Result Value Ref Range    Sodium 137 136 - 145 mmol/L    Potassium reflex Magnesium 4.0 3.5 - 5.1 mmol/L    Chloride 102 99 - 110 mmol/L    CO2 26 21 - 32 mmol/L    Anion Gap 9 3 - 16    Glucose 190 (H) 70 - 99 mg/dL    BUN 8 7 - 20 mg/dL    CREATININE <0.5 (L) 0.6 - 1.2 mg/dL    GFR Non-African American >60 >60    GFR African American >60 >60    Calcium 9.3 8.3 - 10.6 mg/dL    Total Protein 5.3 (L) 6.4 - 8.2 g/dL    Albumin 3.4 3.4 - 5.0 g/dL    Albumin/Globulin Ratio 1.8 1.1 - 2.2    Total Bilirubin 0.3 0.0 - 1.0 mg/dL    Alkaline Phosphatase 82 40 - 129 U/L    ALT <5 (L) 10 - 40 U/L    AST 9 (L) 15 - 37 U/L   EKG 12 Lead   Result Value Ref Range    Ventricular Rate 84 BPM    Atrial Rate 84 BPM    P-R Interval 212 ms    QRS Duration 82 ms    Q-T Interval 366 ms    QTc Calculation (Bazett) 432 ms    P Axis 71 degrees    R Axis 56 degrees    T Axis 75 degrees    Diagnosis       EKG performed in ER and to be interpreted by ER physician. Confirmed by MD, ER ([id number]),  [address], [address] [place] [address] ([id number]) on 7/15/2022 5:53:32 AM       ED BEDSIDE ULTRASOUND:  No results found. RECENT VITALS:  BP: (!) 162/85, Temp: 97.5 °F (36.4 °C), Heart Rate: 72,Resp: 16, SpO2: 97 %     Procedures     Lac Repair    Date/Time: 7/15/2022 12:10 AM  Performed by: Reginald Donald MD  Authorized by: Kianna Steiner MD     Consent:     Consent obtained:  Verbal  Anesthesia:     Anesthesia method:  Local infiltration    Local anesthetic:  Lidocaine 1% WITH epi  Laceration details:     Location:  Scalp    Length (cm):  6  Treatment:     Area cleansed with:  Hibiclens    Amount of cleaning:  Extensive    Irrigation solution:  Sterile saline    Irrigation volume:  7L    Irrigation method:  Syringe    Visualized foreign bodies/material removed: no    Skin repair:     Repair method:  Staples    Number of staples:  10  Approximation:     Approximation:  Close  Repair type:     Repair type:  Simple (10 staples)  Post-procedure details:     Procedure completion:  Tolerated well, no immediate complications    ED Course     Nursing Notes, Past Medical Hx, Past Surgical Hx, Social Hx, Allergies, and Family Hx were reviewed.          The patient was given the followingmedications:  Orders Placed This Encounter   Medications    lidocaine-EPINEPHrine 1 [id number] injection 20 mL    Tetanus-Diphth-Acell Pertussis (BOOSTRIX) injection 0.5 mL    cefTRIAXone (ROCEPHIN) 2 g injection     Grey, [place]: cabinet override    dextrose 5 % infusion     Rachna Early, [place]: cabinet override    cefTRIAXone (ROCEPHIN) 2000 mg IVPB in D5W 50ml minibag     Order Specific Question:   Antimicrobial Indications     Answer:   Urinary Tract Infection    DISCONTD: cefTRIAXone (ROCEPHIN) 2000 mg IVPB in D5W 50ml minibag     Order Specific Question:   Antimicrobial Indications     Answer:   Urinary Tract Infection    sodium chloride flush 0.9 % injection 10 mL    sodium chloride flush 0.9 % injection 10 mL    0.9 % sodium chloride infusion    potassium chloride 10 mEq/100 mL IVPB (Peripheral Line)    magnesium sulfate 2000 mg in 50 mL IVPB premix    OR Linked Order Group     promethazine (PHENERGAN) tablet 12.5 mg     ondansetron (ZOFRAN) injection 4 mg    OR Linked Order Group     acetaminophen (TYLENOL) tablet 650 mg     acetaminophen (TYLENOL) suppository 650 mg    cefTRIAXone (ROCEPHIN) 1000 mg IVPB in 50 mL D5W minibag     Order Specific Question:   Antimicrobial Indications     Answer:   Urinary Tract Infection     Order Specific Question:   UTI duration of therapy     Answer:   5 days    gabapentin (NEURONTIN) capsule 100 mg    traMADol (ULTRAM) tablet 25 mg    aspirin chewable tablet 81 mg    metFORMIN (GLUCOPHAGE) tablet 1,000 mg    DISCONTD: vitamin B-12 extended release tablet 1,000 mcg    rosuvastatin (CRESTOR) tablet 10 mg    perflutren lipid microspheres (DEFINITY) injection 1.65 mg    0.9 % sodium chloride infusion    DISCONTD: vitamin B-12 (CYANOCOBALAMIN) tablet 1,000 mcg    vitamin B-12 (CYANOCOBALAMIN) tablet 1,000 mcg       CONSULTS:  None    MEDICAL DECISION MAKING / ASSESSMENT / Whitley Delacruz is a 80 y.o. female who presents emerged department after a fall found to have a head laceration. On initial evaluation, seemed pretty clear the patient had a mechanical fall although she was unable to describe events immediately prior to her falling. However the son eventually arrived and was able to corroborate the patient's story saying that she lost her footing and tripped. The patient was given a tetanus in the emergency department. CT head and C-spine were obtained and negative for any acute fractures. There were age-related chronic changes. Laboratory studies were obtained. CBC was unremarkable with normal WBC count and stable H&H. The patient's BMP initially had a elevated potassium of 6 with hemolysis present in the specimen so this was repeated and was normal at 4.8. The patient was slightly hyponatremic with a sodium of 131. Normal anion gap and intact renal function. Urinalysis revealed a large amount of leukocyte esterase with greater than 100 WBCs. Notably, the patient had a recent admission for urinary tract infection. Given her age and increasing weakness with subsequent fall, feel the patient would benefit from inpatient admission for IV antibiotics. She was given 2 g of Rocephin prior to admission. At this time, the patient was admitted to the hospitalist service for further management. This patient was also evaluated by the attending physician. All care plans werediscussed and agreed upon. Clinical Impression     1. Injury of head, initial encounter        Disposition     PATIENT REFERRED TO:  No follow-up provider specified.     DISCHARGE MEDICATIONS:  Current Discharge Medication List          DISPOSITION    -Admit to the hospitalist service for further management       Dewey Kaur MD  Resident  07/16/22

## 2022-07-15 NOTE — DISCHARGE INSTRUCTIONS
Sometimes when we do imaging studies we see unexpected findings on the imaging. In your case, we did a:   -CT Cervical spine  and the unexpected finding was a:   -2.3 cm left thyroid lesion.  Recommend follow-up thyroid ultrasound  and the recommendation for this finding is:    Discuss with your primary doctor in 1-2 weeks    Obtain repeat imaging in about 2-4 weeks, or as directed by your primary doctor (who may decide imaging is needed sooner, later, or not at all based on your personal clinical history)

## 2022-07-16 LAB
A/G RATIO: 1.8 (ref 1.1–2.2)
ALBUMIN SERPL-MCNC: 3.4 G/DL (ref 3.4–5)
ALP BLD-CCNC: 82 U/L (ref 40–129)
ALT SERPL-CCNC: <5 U/L (ref 10–40)
ANION GAP SERPL CALCULATED.3IONS-SCNC: 9 MMOL/L (ref 3–16)
AST SERPL-CCNC: 9 U/L (ref 15–37)
BASOPHILS ABSOLUTE: 0.1 K/UL (ref 0–0.2)
BASOPHILS RELATIVE PERCENT: 0.9 %
BILIRUB SERPL-MCNC: 0.3 MG/DL (ref 0–1)
BUN BLDV-MCNC: 8 MG/DL (ref 7–20)
CALCIUM SERPL-MCNC: 9.3 MG/DL (ref 8.3–10.6)
CHLORIDE BLD-SCNC: 102 MMOL/L (ref 99–110)
CO2: 26 MMOL/L (ref 21–32)
CREAT SERPL-MCNC: <0.5 MG/DL (ref 0.6–1.2)
EOSINOPHILS ABSOLUTE: 0.1 K/UL (ref 0–0.6)
EOSINOPHILS RELATIVE PERCENT: 1.5 %
GFR AFRICAN AMERICAN: >60
GFR NON-AFRICAN AMERICAN: >60
GLUCOSE BLD-MCNC: 190 MG/DL (ref 70–99)
HCT VFR BLD CALC: 34.6 % (ref 36–48)
HEMOGLOBIN: 12.2 G/DL (ref 12–16)
LYMPHOCYTES ABSOLUTE: 1.2 K/UL (ref 1–5.1)
LYMPHOCYTES RELATIVE PERCENT: 21.9 %
MCH RBC QN AUTO: 32.9 PG (ref 26–34)
MCHC RBC AUTO-ENTMCNC: 35.4 G/DL (ref 31–36)
MCV RBC AUTO: 93 FL (ref 80–100)
MONOCYTES ABSOLUTE: 0.6 K/UL (ref 0–1.3)
MONOCYTES RELATIVE PERCENT: 10.5 %
NEUTROPHILS ABSOLUTE: 3.6 K/UL (ref 1.7–7.7)
NEUTROPHILS RELATIVE PERCENT: 65.2 %
PDW BLD-RTO: 13 % (ref 12.4–15.4)
PLATELET # BLD: 209 K/UL (ref 135–450)
PMV BLD AUTO: 7 FL (ref 5–10.5)
POTASSIUM REFLEX MAGNESIUM: 4 MMOL/L (ref 3.5–5.1)
RBC # BLD: 3.72 M/UL (ref 4–5.2)
SODIUM BLD-SCNC: 137 MMOL/L (ref 136–145)
TOTAL PROTEIN: 5.3 G/DL (ref 6.4–8.2)
WBC # BLD: 5.6 K/UL (ref 4–11)

## 2022-07-16 PROCEDURE — 87086 URINE CULTURE/COLONY COUNT: CPT

## 2022-07-16 PROCEDURE — 96366 THER/PROPH/DIAG IV INF ADDON: CPT

## 2022-07-16 PROCEDURE — 36415 COLL VENOUS BLD VENIPUNCTURE: CPT

## 2022-07-16 PROCEDURE — 85025 COMPLETE CBC W/AUTO DIFF WBC: CPT

## 2022-07-16 PROCEDURE — 96365 THER/PROPH/DIAG IV INF INIT: CPT

## 2022-07-16 PROCEDURE — G0378 HOSPITAL OBSERVATION PER HR: HCPCS

## 2022-07-16 PROCEDURE — 2580000003 HC RX 258: Performed by: INTERNAL MEDICINE

## 2022-07-16 PROCEDURE — 6370000000 HC RX 637 (ALT 250 FOR IP): Performed by: INTERNAL MEDICINE

## 2022-07-16 PROCEDURE — 80053 COMPREHEN METABOLIC PANEL: CPT

## 2022-07-16 PROCEDURE — 6360000002 HC RX W HCPCS: Performed by: INTERNAL MEDICINE

## 2022-07-16 RX ORDER — ENOXAPARIN SODIUM 100 MG/ML
40 INJECTION SUBCUTANEOUS DAILY
Status: CANCELLED | OUTPATIENT
Start: 2022-07-18

## 2022-07-16 RX ADMIN — ASPIRIN 81 MG 81 MG: 81 TABLET ORAL at 08:20

## 2022-07-16 RX ADMIN — GABAPENTIN 100 MG: 100 CAPSULE ORAL at 20:56

## 2022-07-16 RX ADMIN — CEFTRIAXONE 1000 MG: 1 INJECTION, POWDER, FOR SOLUTION INTRAMUSCULAR; INTRAVENOUS at 04:18

## 2022-07-16 RX ADMIN — METFORMIN HYDROCHLORIDE 1000 MG: 1000 TABLET ORAL at 08:22

## 2022-07-16 RX ADMIN — ROSUVASTATIN CALCIUM 10 MG: 10 TABLET, FILM COATED ORAL at 20:56

## 2022-07-16 RX ADMIN — ACETAMINOPHEN 650 MG: 325 TABLET ORAL at 08:21

## 2022-07-16 RX ADMIN — SODIUM CHLORIDE, PRESERVATIVE FREE 10 ML: 5 INJECTION INTRAVENOUS at 19:30

## 2022-07-16 RX ADMIN — CYANOCOBALAMIN TAB 1000 MCG 1000 MCG: 1000 TAB at 08:21

## 2022-07-16 RX ADMIN — GABAPENTIN 100 MG: 100 CAPSULE ORAL at 08:20

## 2022-07-16 NOTE — PROGRESS NOTES
PM Assessment and Mediations completed. /74   Pulse 83   Temp 98 °F (36.7 °C) (Oral)   Resp 16   Ht 5' 4\" (1.626 m)   Wt 107 lb 2.3 oz (48.6 kg)   SpO2 96%   BMI 18.39 kg/m²     Alert and oriented x3 slight forgetful. CNS grossly intact follows commands, face and tongue symmetrical, PERRLA, grasp weak and equal. Appears groggy this afternoon when compared to my interaction with her this morning. No facial dropping or obvious CNS deficits. Denies pain. Assisted to the bathroom with front rolling walker and gait belt. Gait slow needed CGA. Assisted with PM ADLs pericare and oral care. Patient wanted to sit in the chair and watch \"the red legs\". Positioned in chair for comfort. Found the channel on TV     Plan of care and safety measures reviewed with the patient. Needed items including call light in reach and exit alarm in place.        Electronically signed by Ayanna Soliz RN on 7/15/2022 at 9:47 PM

## 2022-07-16 NOTE — PROGRESS NOTES
Hospitalist Progress Note      PCP: Darline Blue MD    Date of Admission: 7/14/2022    Chief Complaint: Fall with head laceration    Hospital Course: In the ED,she was noted to have a large scalp laceration requiring staples  Urine also has pyuria and she admits dysuria. She was started on antibiotics for UTI     Subjective: This morning she was comfortable with no complaints of pain. No events overnight. Medications:  Reviewed    Infusion Medications    sodium chloride       Scheduled Medications    cefTRIAXone (ROCEPHIN) IV  2,000 mg IntraVENous Once    sodium chloride flush  10 mL IntraVENous 2 times per day    cefTRIAXone (ROCEPHIN) IV  1,000 mg IntraVENous Q24H    gabapentin  100 mg Oral TID    aspirin  81 mg Oral Daily    metFORMIN  1,000 mg Oral Daily with breakfast    rosuvastatin  10 mg Oral Nightly    vitamin B-12  1,000 mcg Oral Daily     PRN Meds: sodium chloride flush, sodium chloride, potassium chloride, magnesium sulfate, promethazine **OR** ondansetron, acetaminophen **OR** acetaminophen, traMADol, perflutren lipid microspheres      Intake/Output Summary (Last 24 hours) at 7/16/2022 1813  Last data filed at 7/16/2022 1308  Gross per 24 hour   Intake 480 ml   Output 100 ml   Net 380 ml       Physical Exam Performed:    BP (!) 149/74   Pulse 80   Temp 98.3 °F (36.8 °C) (Oral)   Resp 17   Ht 5' 4\" (1.626 m)   Wt 108 lb 14.5 oz (49.4 kg)   SpO2 99%   BMI 18.69 kg/m²     General appearance: No apparent distress, appears stated age and cooperative. HEENT: Pupils equal, round, and reactive to light. Conjunctivae/corneas clear. left parieal stapled laceration  Neck: Supple, with full range of motion. No jugular venous distention. Trachea midline. Respiratory:  Normal respiratory effort. Abdomen: Soft, non-tender, non-distended with normal bowel sounds. Musculoskeletal: No clubbing, cyanosis or edema bilaterally.    Skin: Skin color, texture, turgor normal.  No rashes or lesions. Neurologic:  Neurovascularly intact without any focal sensory/motor deficits. Cranial nerves: II-XII intact, grossly non-focal.      Labs:   Recent Labs     07/14/22  2211 07/15/22  0627 07/16/22  0624   WBC 6.4 8.8 5.6   HGB 13.3 12.6 12.2   HCT 38.1 35.6* 34.6*    226 209     Recent Labs     07/14/22  2211 07/15/22  0105 07/15/22  0627 07/16/22  0624   *  --  135* 137   K 6.0* 4.8 4.4 4.0   CL 93*  --  98* 102   CO2 27  --  27 26   BUN 10  --  11 8   CREATININE <0.5*  --  <0.5* <0.5*   CALCIUM 10.0  --  9.5 9.3     Recent Labs     07/15/22  0627 07/16/22  0624   AST 9* 9*   ALT <5* <5*   BILITOT <0.2 0.3   ALKPHOS 80 82     No results for input(s): INR in the last 72 hours. No results for input(s): [place] in the last 72 hours. Urinalysis:      Lab Results   Component Value Date/Time    NITRU Negative 07/14/2022 10:11 PM    WBCUA >100 07/14/2022 10:11 PM    BACTERIA Rare 07/14/2022 10:11 PM    RBCUA 0-2 07/14/2022 10:11 PM    BLOODU Negative 07/14/2022 10:11 PM    SPECGRAV 1.010 07/14/2022 10:11 PM    GLUCOSEU Negative 07/14/2022 10:11 PM       Radiology:  CT CERVICAL SPINE WO CONTRAST   Final Result      1. No acute traumatic abnormality. 2. Cervical spondylosis as described. 3. 2.3 cm left thyroid lesion. Recommend follow-up thyroid ultrasound. CT Head WO Contrast   Final Result      1. No acute intracranial hemorrhage or mass effect. 2. Severe white matter disease. 3. Mild cortical atrophy.               Assessment/Plan:    Active Hospital Problems    Diagnosis     UTI (urinary tract infection) [N39.0]      Priority: Medium        Falls-recurrent  Likely sec to DM neuropathy/UTI  -on neurontin  Head injury following fall-required staples in the ED to left  parietal region  Echo  Tele monitor  -Vitamin D level pending     UTI  Urine culture pending  Continue Rocephin      Scalp laceration  Head with laceration (possibly multiple or stellate) with thick clot overlying to L pareital region without depression. Wound care  Pain control      Type 2 diabetes mellitus  complicated by Diabetic neuropathy    -accuchecks qac and hs  On metformin  Add ensure nutritional supplement     Gait/Balance disorder  -associated falls  PT/OT       Constipation, chronic  Daily miralax     Hyperlipidemia  On crestor    DVT Prophylaxis: Lovenox  Diet: ADULT DIET; Regular; 3 carb choices (45 gm/meal); Low Fat/Low Chol/High Fiber/2 gm Na; Low Sodium (2 gm)  ADULT ORAL NUTRITION SUPPLEMENT; Breakfast, PM Snack, Lunch; Standard High Calorie/High Protein Oral Supplement  Code Status: Full Code    PT/OT Eval Status:  [facility] versus SNF    Ramin Mason MD

## 2022-07-17 ENCOUNTER — APPOINTMENT (OUTPATIENT)
Dept: GENERAL RADIOLOGY | Age: 87
DRG: 690 | End: 2022-07-17
Payer: MEDICARE

## 2022-07-17 LAB
ANION GAP SERPL CALCULATED.3IONS-SCNC: 6 MMOL/L (ref 3–16)
BUN BLDV-MCNC: 8 MG/DL (ref 7–20)
CALCIUM SERPL-MCNC: 9.3 MG/DL (ref 8.3–10.6)
CHLORIDE BLD-SCNC: 99 MMOL/L (ref 99–110)
CO2: 26 MMOL/L (ref 21–32)
CREAT SERPL-MCNC: <0.5 MG/DL (ref 0.6–1.2)
GFR AFRICAN AMERICAN: >60
GFR NON-AFRICAN AMERICAN: >60
GLUCOSE BLD-MCNC: 196 MG/DL (ref 70–99)
POTASSIUM SERPL-SCNC: 3.9 MMOL/L (ref 3.5–5.1)
SODIUM BLD-SCNC: 131 MMOL/L (ref 136–145)
VITAMIN D 25-HYDROXY: 45.4 NG/ML

## 2022-07-17 PROCEDURE — 96366 THER/PROPH/DIAG IV INF ADDON: CPT

## 2022-07-17 PROCEDURE — 2580000003 HC RX 258: Performed by: INTERNAL MEDICINE

## 2022-07-17 PROCEDURE — 82306 VITAMIN D 25 HYDROXY: CPT

## 2022-07-17 PROCEDURE — 71045 X-RAY EXAM CHEST 1 VIEW: CPT

## 2022-07-17 PROCEDURE — 36415 COLL VENOUS BLD VENIPUNCTURE: CPT

## 2022-07-17 PROCEDURE — 6360000002 HC RX W HCPCS: Performed by: INTERNAL MEDICINE

## 2022-07-17 PROCEDURE — 80048 BASIC METABOLIC PNL TOTAL CA: CPT

## 2022-07-17 PROCEDURE — 6370000000 HC RX 637 (ALT 250 FOR IP): Performed by: INTERNAL MEDICINE

## 2022-07-17 PROCEDURE — 1200000000 HC SEMI PRIVATE

## 2022-07-17 RX ORDER — MECOBALAMIN 5000 MCG
5 TABLET,DISINTEGRATING ORAL NIGHTLY
Status: DISCONTINUED | OUTPATIENT
Start: 2022-07-17 | End: 2022-07-20 | Stop reason: HOSPADM

## 2022-07-17 RX ADMIN — CYANOCOBALAMIN TAB 1000 MCG 1000 MCG: 1000 TAB at 08:03

## 2022-07-17 RX ADMIN — CEFTRIAXONE 1000 MG: 1 INJECTION, POWDER, FOR SOLUTION INTRAMUSCULAR; INTRAVENOUS at 03:01

## 2022-07-17 RX ADMIN — GABAPENTIN 100 MG: 100 CAPSULE ORAL at 20:53

## 2022-07-17 RX ADMIN — GABAPENTIN 100 MG: 100 CAPSULE ORAL at 13:25

## 2022-07-17 RX ADMIN — METFORMIN HYDROCHLORIDE 1000 MG: 1000 TABLET ORAL at 08:03

## 2022-07-17 RX ADMIN — GABAPENTIN 100 MG: 100 CAPSULE ORAL at 08:03

## 2022-07-17 RX ADMIN — Medication 5 MG: at 20:53

## 2022-07-17 RX ADMIN — ROSUVASTATIN CALCIUM 10 MG: 10 TABLET, FILM COATED ORAL at 20:54

## 2022-07-17 RX ADMIN — SODIUM CHLORIDE, PRESERVATIVE FREE 10 ML: 5 INJECTION INTRAVENOUS at 08:09

## 2022-07-17 RX ADMIN — Medication 5 MG: at 01:27

## 2022-07-17 RX ADMIN — SODIUM CHLORIDE, PRESERVATIVE FREE 10 ML: 5 INJECTION INTRAVENOUS at 20:54

## 2022-07-17 NOTE — PLAN OF CARE
Problem: Pain  Goal: Verbalizes/displays adequate comfort level or baseline comfort level  7/17/2022 0919 by Momo Tadeo RN  Outcome: Progressing   Pt educated on importance of calling for pain meds when in pain. Pt verbalized understanding. Pain assessment completed at least once per shift. Will continue to monitor. Problem: Safety - Adult  Goal: Free from fall injury  7/17/2022 0919 by Momo Tadeo RN  Outcome: Progressing   Pt in bed with bed alarm on, instructed to call for assistance. Verbalized understanding. All fall precautions in place. Problem: Skin/Tissue Integrity  Goal: Absence of new skin breakdown  Description: 1. Monitor for areas of redness and/or skin breakdown  2. Assess vascular access sites hourly  3. Every 4-6 hours minimum:  Change oxygen saturation probe site  4. Every 4-6 hours:  If on nasal continuous positive airway pressure, respiratory therapy assess nares and determine need for appliance change or resting period. Outcome: Progressing   Pt to remain free of skin breakdown during stay, pt encouraged to turn and reposition frequently.

## 2022-07-17 NOTE — PLAN OF CARE
Problem: Chronic Conditions and Co-morbidities  Goal: Patient's chronic conditions and co-morbidity symptoms are monitored and maintained or improved  Flowsheets (Taken 7/16/2022 2052)  Care Plan - Patient's Chronic Conditions and Co-Morbidity Symptoms are Monitored and Maintained or Improved: Monitor and assess patient's chronic conditions and comorbid symptoms for stability, deterioration, or improvement     Problem: Discharge Planning  Goal: Discharge to home or other facility with appropriate resources  Flowsheets (Taken 7/16/2022 2052)  Discharge to home or other facility with appropriate resources: Identify barriers to discharge with patient and caregiver  Note: Will continue to update client and family with any changes to plan of care     Problem: Pain  Goal: Verbalizes/displays adequate comfort level or baseline comfort level  Note: Client denies pain this shift

## 2022-07-17 NOTE — PROGRESS NOTES
Hospitalist Progress Note      PCP: Lita Curling, MD    Date of Admission: 7/14/2022    Chief Complaint: Fall with head laceration    Hospital Course: In the ED,she was noted to have a large scalp laceration requiring staples  Urine also has pyuria and she admits dysuria. She was started on antibiotics for UTI     Subjective: This morning she was comfortable with no complaints of pain. No events overnight. She is asking me to go home this morning. Still daughter and son discussing sending him back home versus go to a facility. She is definitely very weak and need 24/7 assistance and supervision. Medications:  Reviewed    Infusion Medications    sodium chloride       Scheduled Medications    melatonin  5 mg Oral Nightly    cefTRIAXone (ROCEPHIN) IV  2,000 mg IntraVENous Once    sodium chloride flush  10 mL IntraVENous 2 times per day    cefTRIAXone (ROCEPHIN) IV  1,000 mg IntraVENous Q24H    gabapentin  100 mg Oral TID    aspirin  81 mg Oral Daily    metFORMIN  1,000 mg Oral Daily with breakfast    rosuvastatin  10 mg Oral Nightly    vitamin B-12  1,000 mcg Oral Daily     PRN Meds: sodium chloride flush, sodium chloride, potassium chloride, magnesium sulfate, promethazine **OR** ondansetron, acetaminophen **OR** acetaminophen, traMADol, perflutren lipid microspheres      Intake/Output Summary (Last 24 hours) at 7/17/2022 1551  Last data filed at 7/17/2022 1328  Gross per 24 hour   Intake 1030 ml   Output 1900 ml   Net -870 ml       Physical Exam Performed:    BP (!) 155/71   Pulse 74   Temp 97.9 °F (36.6 °C) (Oral)   Resp 17   Ht 5' 4\" (1.626 m)   Wt 108 lb 14.5 oz (49.4 kg)   SpO2 97%   BMI 18.69 kg/m²     General appearance: No apparent distress, appears stated age and cooperative. HEENT: Pupils equal, round, and reactive to light. Conjunctivae/corneas clear. left parieal stapled laceration  Neck: Supple, with full range of motion. No jugular venous distention.  Trachea midline. Respiratory:  Normal respiratory effort. Abdomen: Soft, non-tender, non-distended with normal bowel sounds. Musculoskeletal: No clubbing, cyanosis or edema bilaterally. Skin: Skin color, texture, turgor normal.  No rashes or lesions. Neurologic:  Neurovascularly intact without any focal sensory/motor deficits. Cranial nerves: II-XII intact, grossly non-focal.      Labs:   Recent Labs     07/14/22  2211 07/15/22  0627 07/16/22  0624   WBC 6.4 8.8 5.6   HGB 13.3 12.6 12.2   HCT 38.1 35.6* 34.6*    226 209     Recent Labs     07/15/22  0627 07/16/22  0624 07/17/22  0707   * 137 131*   K 4.4 4.0 3.9   CL 98* 102 99   CO2 27 26 26   BUN 11 8 8   CREATININE <0.5* <0.5* <0.5*   CALCIUM 9.5 9.3 9.3     Recent Labs     07/15/22  0627 07/16/22  0624   AST 9* 9*   ALT <5* <5*   BILITOT <0.2 0.3   ALKPHOS 80 82     No results for input(s): INR in the last 72 hours. No results for input(s): Jaci Kras in the last 72 hours. Urinalysis:      Lab Results   Component Value Date/Time    NITRU Negative 07/14/2022 10:11 PM    WBCUA >100 07/14/2022 10:11 PM    BACTERIA Rare 07/14/2022 10:11 PM    RBCUA 0-2 07/14/2022 10:11 PM    BLOODU Negative 07/14/2022 10:11 PM    SPECGRAV 1.010 07/14/2022 10:11 PM    GLUCOSEU Negative 07/14/2022 10:11 PM       Radiology:  XR CHEST PORTABLE   Final Result   1. No localized consolidation or pleural effusion. CT CERVICAL SPINE WO CONTRAST   Final Result      1. No acute traumatic abnormality. 2. Cervical spondylosis as described. 3. 2.3 cm left thyroid lesion. Recommend follow-up thyroid ultrasound. CT Head WO Contrast   Final Result      1. No acute intracranial hemorrhage or mass effect. 2. Severe white matter disease. 3. Mild cortical atrophy.               Assessment/Plan:    Active Hospital Problems    Diagnosis     UTI (urinary tract infection) [N39.0]      Priority: Medium        Falls-recurrent  Likely sec to DM neuropathy/UTI  -on

## 2022-07-18 LAB
ANION GAP SERPL CALCULATED.3IONS-SCNC: 10 MMOL/L (ref 3–16)
BUN BLDV-MCNC: 10 MG/DL (ref 7–20)
CALCIUM SERPL-MCNC: 9.4 MG/DL (ref 8.3–10.6)
CHLORIDE BLD-SCNC: 99 MMOL/L (ref 99–110)
CO2: 24 MMOL/L (ref 21–32)
CREAT SERPL-MCNC: <0.5 MG/DL (ref 0.6–1.2)
GFR AFRICAN AMERICAN: >60
GFR NON-AFRICAN AMERICAN: >60
GLUCOSE BLD-MCNC: 203 MG/DL (ref 70–99)
LV EF: 58 %
LVEF MODALITY: NORMAL
POTASSIUM SERPL-SCNC: 4 MMOL/L (ref 3.5–5.1)
SODIUM BLD-SCNC: 133 MMOL/L (ref 136–145)
URINE CULTURE, ROUTINE: NORMAL

## 2022-07-18 PROCEDURE — 97535 SELF CARE MNGMENT TRAINING: CPT

## 2022-07-18 PROCEDURE — 97116 GAIT TRAINING THERAPY: CPT

## 2022-07-18 PROCEDURE — 6370000000 HC RX 637 (ALT 250 FOR IP): Performed by: INTERNAL MEDICINE

## 2022-07-18 PROCEDURE — 80048 BASIC METABOLIC PNL TOTAL CA: CPT

## 2022-07-18 PROCEDURE — 97530 THERAPEUTIC ACTIVITIES: CPT

## 2022-07-18 PROCEDURE — 2060000000 HC ICU INTERMEDIATE R&B

## 2022-07-18 PROCEDURE — 93306 TTE W/DOPPLER COMPLETE: CPT

## 2022-07-18 PROCEDURE — 2580000003 HC RX 258: Performed by: INTERNAL MEDICINE

## 2022-07-18 PROCEDURE — 6360000002 HC RX W HCPCS: Performed by: INTERNAL MEDICINE

## 2022-07-18 RX ORDER — ASPIRIN 81 MG/1
81 TABLET ORAL DAILY
Qty: 90 TABLET | Refills: 1 | Status: ON HOLD | OUTPATIENT
Start: 2022-07-18 | End: 2022-08-27

## 2022-07-18 RX ORDER — CEFDINIR 300 MG/1
300 CAPSULE ORAL 2 TIMES DAILY
Qty: 14 CAPSULE | Refills: 0 | Status: SHIPPED | OUTPATIENT
Start: 2022-07-18 | End: 2022-07-25

## 2022-07-18 RX ORDER — GREEN TEA/HOODIA GORDONII 315-12.5MG
1 CAPSULE ORAL 2 TIMES DAILY
Qty: 60 TABLET | Refills: 0 | Status: SHIPPED | OUTPATIENT
Start: 2022-07-18 | End: 2022-08-17

## 2022-07-18 RX ADMIN — METFORMIN HYDROCHLORIDE 1000 MG: 1000 TABLET ORAL at 09:58

## 2022-07-18 RX ADMIN — GABAPENTIN 100 MG: 100 CAPSULE ORAL at 08:12

## 2022-07-18 RX ADMIN — CYANOCOBALAMIN TAB 1000 MCG 1000 MCG: 1000 TAB at 08:12

## 2022-07-18 RX ADMIN — CEFTRIAXONE 1000 MG: 1 INJECTION, POWDER, FOR SOLUTION INTRAMUSCULAR; INTRAVENOUS at 04:12

## 2022-07-18 RX ADMIN — GABAPENTIN 100 MG: 100 CAPSULE ORAL at 15:09

## 2022-07-18 RX ADMIN — GABAPENTIN 100 MG: 100 CAPSULE ORAL at 20:27

## 2022-07-18 RX ADMIN — ASPIRIN 81 MG 81 MG: 81 TABLET ORAL at 08:12

## 2022-07-18 RX ADMIN — Medication 5 MG: at 20:27

## 2022-07-18 RX ADMIN — ROSUVASTATIN CALCIUM 10 MG: 10 TABLET, FILM COATED ORAL at 20:27

## 2022-07-18 ASSESSMENT — PAIN SCALES - GENERAL: PAINLEVEL_OUTOF10: 0

## 2022-07-18 NOTE — CARE COORDINATION
Case Management Assessment           Initial Evaluation                Date / Time of Evaluation: 7/18/2022 9:32 AM                 Assessment Completed by: DINA Saravia    Patient Name: Arnaud Kurtz     YOB: 1925  Diagnosis: UTI (urinary tract infection) [N39.0]  Injury of head, initial encounter [S09.90XA]     Date / Time: 7/14/2022  8:48 PM    Patient Admission Status: Inpatient    If patient is discharged prior to next notation, then this note serves as note for discharge by case management. Current PCP: Naz Ramos MD  Clinic Patient: No    Chart Reviewed: Yes  Patient/ Family Interviewed: Yes    Initial assessment completed at bedside with: patient    Hospitalization in the last 30 days: No    Emergency Contacts:  Extended Emergency Contact Information  Primary Emergency Contact: Bob Yusuf  Address: [place], [address] [place] [address] Phone: [phone]  Relation: Child  Secondary Emergency Contact: [address] [place] [address] of [address] [place] [address] Phone: [phone]  Work Phone: [phone]  Mobile Phone: [phone]  Relation: Child    Advance Directives:   Code Status: [address]: No  Agent: none  Contact Number: none    Copy present: No     In paper Chart: No    Scanned into EMR No    Financial  Payor: MEDICARE / Plan: MEDICARE PART A AND B / Product Type: *No Product type* /     Pre-cert required for SNF: No    Pharmacy    [facility] [id number] [facility] [address]  Phone: [phone] Fax: [phone]      Potential assistance Purchasing Medications: Potential Assistance Purchasing Medications: No  Does Patient want to participate in local refill/ meds to beds program?: Yes    Meds To Beds General Rules:  1. Can ONLY be done Monday- Friday between 8:30am-5pm  2.  Prescription(s) must be in pharmacy by 3pm to be filled same day  3. Copy of patient's insurance/ prescription drug card and patient face sheet must be sent along with the prescription(s)  4. Cost of Rx cannot be added to hospital bill. If financial assistance is needed, please contact unit  or ;  or  CANNOT provide pharmacy voucher for patients co-pays  5.  Patients can then  the prescription on their way out of the hospital at discharge, or pharmacy can deliver to the bedside if staff is available. (payment due at time of pick-up or delivery - cash, check, or card accepted)     Able to afford home medications/ co-pay costs: Yes    ADLS  Support Systems: Children    PT AM-PAC: 16 /24  OT AM-PAC: 15 /24    [place]: two story home  Steps: yes    Plans to RETURN to current housing: Yes  Barriers to RETURNING to current housing: medical clearance    Home Care Information  Currently ACTIVE with [address]: No  Home Care Agency: Not Applicable    Currently ACTIVE with [place] [facility]: No  Passport/ Waiver: No  Passport/ Waiver Services: Not Applicable      Durable Medical Equipment  DME Provider: unknown  Equipment: rollator    Home Oxygen and [address] [place] prior to admission: No  [address]: Not Applicable  Other Respiratory Equipment: none    Informed of need to bring portable home O2 tank on day of DISCHARGE for nursing to connect prior to leaving: No  Verbalized agreement/Understanding: No  Person to bring portable tank at discharge: none    Dialysis  Active with HD/PD prior to admission: No  Nephrologist: none    HD Center:  Not Applicable    DISCHARGE PLAN:  Disposition: Home with [address]: family     Factors facilitating achievement of predicted outcomes: Family support, Motivated, and Cooperative    Barriers to discharge: medical clearance    Additional Case Management Notes: Patient is from home with her son. She informs that her son assists her with everything. She is interested in home health care and a [facility] referral. Need to confirm that son will be able to care for the patient at discharge otherwise will have to coordinate SNF placement. Attempted to reach son, but there was no answer. Will follow-up later this morning. Referral to [facility] in the meantime. 10:44 AM  Spoke with patient's son. He is agreeable to taking the patient home with home health care and a [facility] referral. He will transport the patient home. Reviewed IMM letter via phone and patient's son expressed understanding. All are in agreement to the patient's discharge plan. 11:00 AM  Patient worked with therapy this morning. They are recommending SNF. Patient and son would now like to pursue SNF placement. They are requesting a referral be sent to [address]  at [facility]. Referral sent and VM left in admission. 3:48 PM  [facility] has no bed availability. Met with family at bedside and they were given a copy of the SNF list. They will review and inform what their preferences are. Will follow-up tomorrow. The Plan for Transition of Care is related to the following treatment goals of UTI (urinary tract infection) [N39.0]  Injury of head, initial encounter [S09.90XA]    The Patient and/or patient representative Rhiannon Baldwin and her family were provided with a choice of provider and agrees with the discharge plan Yes    Freedom of choice list was provided with basic dialogue that supports the patient's individualized plan of care/goals and shares the quality data associated with the providers.  Yes    Care Transition patient: No    Андрей Lopes [address] Collins  Case Management Department  Ph: [phone]   Fax: [phone]

## 2022-07-18 NOTE — PROGRESS NOTES
Patient seen and examined  Doing well today no distress  No pain at this time does not relay complaints. She just wants her h air shampooed     Vitals:    07/18/22 0026 07/18/22 0409 07/18/22 0600 07/18/22 1040   BP: (!) 140/67 (!) 129/55     Pulse: 82 66     Resp:       Temp: 97.6 °F (36.4 °C) 98 °F (36.7 °C)     TempSrc: Oral Oral     SpO2: 98% 97%     Weight:   109 lb 9.1 oz (49.7 kg) 105 lb 9.6 oz (47.9 kg)   Height:          Gen: pleasant alert oriented   Neck: no jvd  Chest: clear bilaterally  Cvs s2s1 no murmurs  Abd: soft nd nt bs normal active  Ext: no edema positive pulses    Sodium 133 bun 10 creatinine less than 0.5     81 yo female admitted with fall and head laceration. She also has a urinary infection. I reviewed all prior cultures, she is sensitive to rocephin, while in the hospital will continue and then add something po which covers the recent organism, likely an oral cephalosporin. Patient has no other complaints, she has a  hx of diabetes, sugars are adequate will monitor. Dw  will likely need snf at [place].

## 2022-07-18 NOTE — DISCHARGE INSTR - COC
Continuity of Care Form    Patient Name: Nica Dalal   :  1925  MRN:  [id number]    Admit date:  2022  Discharge date:  22    Code Status Order: Full Code   Advance Directives:     Admitting Physician:  Myriam Salinas MD  PCP: Eliseo Castro MD    Discharging Nurse: Janice Fox [address] Unit/Room#: 5715/3424-41  Discharging Unit Phone Number: [phone]    Emergency Contact:   Extended Emergency Contact Information  Primary Emergency Contact: VivianakourtneyBob  Address: [place], [address] Phone: [phone]  Relation: Child  Secondary Emergency Contact: [address] [place] [address] of [address] [place] [address] Phone: [phone]  Work Phone: [phone]  Mobile Phone: [phone]  Relation: Child    Past Surgical History:  Past Surgical History:   Procedure Laterality Date    CYSTOCELE REPAIR      HYSTERECTOMY (CERVIX STATUS UNKNOWN)      ovaries intact    RECTOCELE REPAIR      TOTAL KNEE ARTHROPLASTY         Immunization History:   Immunization History   Administered Date(s) Administered    COVID-19, PFIZER PURPLE top, DILUTE for use, (age 15 y+), 30mcg/0.3mL 2021, 2021, 10/22/2021    Influenza Vaccine, unspecified formulation 2009, 2010, 2012, 2013, 10/09/2014, 12/10/2015, 2016, 2017    Influenza Virus Vaccine 2009    Influenza Whole 2010    Influenza, High Dose (Fluzone 65 yrs and older) 2011, 2012, 2013, 10/09/2014    Pneumococcal Conjugate 13-valent ([id number]) 2015    Pneumococcal Polysaccharide (Cancekzec72) 2007    Td, unspecified formulation 2003    Tdap (Boostrix, Adacel) 2003, 2022       Active Problems:  Patient Active Problem List   Diagnosis Code    Hyperlipidemia E78.5    Essential hypertension with goal blood pressure less than 150/90 I10    Type 2 diabetes mellitus ([facility] [address] E11.9    Diverticulitis K57.92    Exposure [id number]    Peripheral edema R60.9    Balance disorder R26.89    Constipation, chronic K59.09    Diabetic neuropathy (HCC) E11.40    B12 deficiency E53.8    Sleep apnea G47.30    Depression F32. A    Low magnesium levels R79.0    Age-related macular degeneration, dry H35.3190    Actinic keratosis L57.0    Benign neoplasm of skin D23.9    Cancer, skin, squamous cell C44.92    Contact dermatitis and other eczema, due to unspecified cause L25.9    DDD (degenerative disc disease), cervical M50.30    Dermatophytosis of foot B35.3    Diabetic polyneuropathy associated with type 2 diabetes mellitus (HCC) E11.42    Dysuria R30.0    Inflamed seborrheic keratosis L82.0    Osteoarthrosis involving lower leg [id number]    Urinary incontinence R32    UTI (urinary tract infection) N39.0       Isolation/Infection:   Isolation            No Isolation          Patient Infection Status       None to display            Nurse Assessment:  Last Vital Signs: BP (!) 129/55   Pulse 66   Temp 98 °F (36.7 °C) (Oral)   Resp 18   Ht 5' 4\" (1.626 m)   Wt 109 lb 9.1 oz (49.7 kg)   SpO2 97%   BMI 18.81 kg/m²     Last documented pain score (0-10 scale): Pain Level: 0  Last Weight:   Wt Readings from Last 1 Encounters:   07/18/22 109 lb 9.1 oz (49.7 kg)     Mental Status:  oriented, alert, coherent, logical, and thought processes intact    IV Access:  - None    Nursing Mobility/ADLs:  Walking   Assisted  Transfer  Assisted  Bathing  Assisted  Dressing  Assisted  Toileting  Assisted  Feeding  Assisted  Med Admin  Assisted  Med Delivery   whole    Wound Care Documentation and Therapy:  Wound 07/15/22 Brachial Anterior;Distal;Left (Active)   Dressing Status Dry;Clean; Intact 07/17/22 1655   Wound Cleansed Cleansed with saline 07/15/22 0558   Dressing/Treatment Foam 07/17/22 1655   Wound Length (cm) 1 cm 07/15/22 0558   Wound Width (cm) 0.5 cm 07/15/22 0558   Wound Depth (cm) 0 cm 07/15/22 0558   Wound Surface Area (cm^2) 0.5 cm^2 07/15/22 0558   Wound Volume (cm^3) 0 cm^3 07/15/22 0558   Drainage Amount None 07/17/22 1655   Drainage Description Sanguinous 07/15/22 0815   Odor None 07/17/22 1655   Margins Unattached edges 07/15/22 0558   Number of days: 3       Wound 07/15/22 Buttocks (Active)   Dressing Status Clean;Dry; Intact 07/17/22 1655   Drainage Amount None 07/17/22 1655   Odor None 07/17/22 1655   Number of days: 3       Wound 07/15/22 Head Left;Upper Laceration closed with 10 staples. (Active)   Wound Etiology Traumatic 07/18/22 0035   Dressing Status Old drainage noted 07/18/22 0035   Dressing/Treatment Open to air 07/18/22 0035   Drainage Amount None 07/16/22 1742   Odor None 07/17/22 1655   Number of days: 3        Elimination:  Continence: Bowel: Yes  Bladder: No  Urinary Catheter: None   Colostomy/Ileostomy/Ileal Conduit: No       Date of Last BM: 7/16/22      Intake/Output Summary (Last 24 hours) at 7/18/2022 1017  Last data filed at 7/18/2022 0656  Gross per 24 hour   Intake 800 ml   Output 1700 ml   Net -900 ml     I/O last 3 completed shifts: In: 1280 [P.O.:1280]  Out: 2800 [Urine:2800]    Safety Concerns: At Risk for Falls    Impairments/Disabilities:      Vision and Hearing    Nutrition Therapy:  Current Nutrition Therapy:   - Oral Diet:  General    Routes of Feeding: Oral  Liquids: No Restrictions  Daily Fluid Restriction: no  Last Modified Barium Swallow with Video (Video Swallowing Test): not done    Treatments at the Time of Hospital Discharge:   Respiratory Treatments: n/a  Oxygen Therapy:  is not on home oxygen therapy.   Ventilator:    - No ventilator support    Rehab Therapies: Physical Therapy and Occupational Therapy  Weight Bearing Status/Restrictions: No weight bearing restrictions  Other Medical Equipment (for information only, NOT a DME order):  walker and bedside commode  Other Treatments: n/a    Patient's personal belongings (please select all that are sent with patient):  Glasses, Hearing Aides bilateral    RN SIGNATURE:  Electronically signed by Nany Walter RN on 7/20/22 at 8:57 AM EDT    CASE MANAGEMENT/SOCIAL WORK SECTION    Inpatient Status Date: 7/17/2022    Readmission Risk Assessment Score:  Readmission Risk              Risk of Unplanned Readmission:  [id number]           Discharging to Facility/ Agency   Name: [facility]  Address: [address] [place] [address]  Phone: [phone]:  Fax: [phone]    / signature: Electronically signed by DINA Beltran on 7/20/22 at 9:18 AM EDT    PHYSICIAN SECTION    Prognosis: Good    Condition at Discharge: Stable    Rehab Potential (if transferring to Rehab): Good    Recommended Labs or Other Treatments After Discharge: monitor po intake, monitor sugars pt/pt    Physician Certification: I certify the above information and transfer of Theador Rm  is necessary for the continuing treatment of the diagnosis listed and that she requires [place] for more or less than 30 days.      Update Admission H&P: Changes in H&P as follows - change in antibiotic    PHYSICIAN SIGNATURE:  Electronically signed by Jeannie Terry MD on 7/18/22 at 10:17 AM EDT

## 2022-07-18 NOTE — PROGRESS NOTES
Physical Therapy  Facility/Department: [address]  Physical Therapy Initial Daily Treatment Note    Name: Bang Mahmood  : 1925  MRN: [id number]  Date of Service: 2022    Discharge Recommendations:    Bang Mahmood scored a 15/24 on the AM-PAC short mobility form. Current research shows that an AM-PAC score of 17 or less is typically not associated with a discharge to the patient's home setting. Based on the patient's AM-PAC score and their current functional mobility deficits, it is recommended that the patient have 3-5 sessions per week of Physical Therapy at d/c to increase the patient's independence. Please see assessment section for further patient specific details. If patient discharges prior to next session this note will serve as a discharge summary. Please see below for the latest assessment towards goals. PT Equipment Recommendations  Equipment Needed: No      Patient Diagnosis(es): The encounter diagnosis was Injury of head, initial encounter. Past Medical History:  has a past medical history of Age-related macular degeneration, dry, Anemia B12 deficiency, Balance disorder, Cancer, skin, squamous cell, Chronic constipation, Colorectal polyps, Depression, Diverticulosis, Epistaxis, Hyperlipidemia, Hypertension, Low magnesium levels, Neuropathy, Osteoarthritis, Peripheral edema, Pruritis, Recurrent UTI, Small vessel disease, cerebrovascular, Tuberculosis, Type II or unspecified type diabetes mellitus without mention of complication, not stated as uncontrolled, Unspecified sleep apnea, and Urgency incontinence. Past Surgical History:  has a past surgical history that includes Cystocele repair; Rectocele repair; Total knee arthroplasty; and Hysterectomy. Assessment   Body Structures, Functions, Activity Limitations Requiring Skilled Therapeutic Intervention: Decreased functional mobility ; Decreased strength;Decreased endurance  Assessment: Continues to fatigue quickly with activity. Needing min to mod assist for transfers and min assist for gt short distances with walker. At risk for falls and not safe to ambulate alone. Safety concerns for pt returning home. Rec cont skilled PT to maximize mobility and independence. Treatment Diagnosis: impaired functional mobility 2/2 decreased strength and endurnace  Requires PT Follow-Up: Yes     Plan   Plan  Plan:  (2-5)  Current Treatment Recommendations: Strengthening, Functional mobility training, Gait training, Stair training, Patient/Caregiver education & training, Safety education & training  Safety Devices  Type of Devices: All fall risk precautions in place, Call light within reach, Chair alarm in place, Gait belt, Patient at risk for falls, Left in chair, Nurse notified     Restrictions  Position Activity Restriction  Other position/activity restrictions: up with assist     Subjective   General  Chart Reviewed: Yes  Additional Pertinent Hx: Pt is 80 y.o. female adm 7/14 with UTI. Pt s/p fall with head laceration. Head CT: neg.  CT Cspine: neg. PMH:Age-related macular degeneration, dry, Anemia B12 deficiency, Balance disorder, Cancer, skin, squamous cell, Chronic constipation, Colorectal polyps, Depression, Diverticulosis, Epistaxis, Hyperlipidemia, Hypertension, Low magnesium levels, Neuropathy, Osteoarthritis, Peripheral edema, Pruritis, Recurrent UTI, Small vessel disease, cerebrovascular, Tuberculosis, Type II or unspecified type diabetes mellitus without mention of complication, not stated as uncontrolled, Unspecified sleep apnea, and Urgency incontinence  Referring Practitioner: Samantha Reed DO  Referral Date : 07/15/22  Subjective  Subjective: Pt found supine. Agreeable to PT.          Social/Functional History  Social/Functional History  Lives With: Son  Type of Home: Condo  Home Layout: Multi-level, Bed/Bath upstairs, Able to Live on Main level with bedroom/bathroom (1 step down into living room)  Home Access: Stairs to enter with rails  Entrance Stairs - Number of Steps: 3  Bathroom Shower/Tub: Walk-in shower  Bathroom Equipment: 3-in-1 commode  Home Equipment: [employer], 4 wheeled  Has the patient had two or more falls in the past year or any fall with injury in the past year?: Yes  Receives Help From: Family  ADL Assistance: [address]: Needs assistance (son takes care of everything)  Ambulation Assistance: Independent (with rollator, short distances)  Transfer Assistance: Independent  Active : No  Additional Comments: Son with her all the time - has had stroke in past but able to help her as needed  Vision/Hearing  Vision  Vision: Within Functional Limits (wears glasses)  Hearing  H[facility]: [facility]  Hearing Exceptions: Hard of hearing/hearing concerns    Cognition   Orientation  Overall Orientation Status: Within Functional Limits  Cognition  Overall Cognitive Status: Exceptions  Arousal/Alertness: Delayed responses to stimuli; Appropriate responses to stimuli  Following Commands: Follows one step commands with increased time; Follows one step commands with repetition  Attention Span: Attends with cues to redirect; Difficulty dividing attention; Difficulty attending to directions  Insights: Decreased awareness of deficits  Initiation: Requires cues for some  Sequencing: Requires cues for some     Objective                                Bed mobility  Supine to Sit: Stand by assistance (HOB elevated)  Transfers  Sit to Stand: Minimal Assistance (from bed, mod assist from chair - tends to pull up on walker despite cues)  Stand to sit: Minimal Assistance (to chair, cues for hand placement and safety)  Ambulation  Device: Rolling Walker  Assistance: Minimal assistance  Quality of Gait: trunk flexed, decreased bilat step length/height, slow and effortful  Distance: 10' x 2  Seated rest break for several minutes between walks  Comments: Fatigues quickly with activity

## 2022-07-18 NOTE — PROGRESS NOTES
Occupational Therapy  Facility/Department: [facility]  Occupational Therapy Treatment    Name: Favian Garcia  : 1925  MRN: [id number]  Date of Service: 2022    Discharge Recommendations: Favian Garcia scored a 15/24 on the AM-PAC ADL Inpatient form. Current research shows that an AM-PAC score of 17 or less is typically not associated with a discharge to the patient's home setting. Based on the patient's AM-PAC score and their current ADL deficits, it is recommended that the patient have 3-5 sessions per week of Occupational Therapy at d/c to increase the patient's independence. Please see assessment section for further patient specific details. If patient discharges prior to next session this note will serve as a discharge summary. Please see below for the latest assessment towards goals. OT Equipment Recommendations  Equipment Needed: No       Patient Diagnosis(es): The encounter diagnosis was Injury of head, initial encounter. Past Medical History:  has a past medical history of Age-related macular degeneration, dry, Anemia B12 deficiency, Balance disorder, Cancer, skin, squamous cell, Chronic constipation, Colorectal polyps, Depression, Diverticulosis, Epistaxis, Hyperlipidemia, Hypertension, Low magnesium levels, Neuropathy, Osteoarthritis, Peripheral edema, Pruritis, Recurrent UTI, Small vessel disease, cerebrovascular, Tuberculosis, Type II or unspecified type diabetes mellitus without mention of complication, not stated as uncontrolled, Unspecified sleep apnea, and Urgency incontinence. Past Surgical History:  has a past surgical history that includes Cystocele repair; Rectocele repair; Total knee arthroplasty; and Hysterectomy. Treatment Diagnosis: impaired mobility, transfers, ADL      Assessment   Performance deficits / Impairments: Decreased functional mobility ; Decreased ADL status; Decreased endurance;Decreased cognition;Decreased safe awareness;Decreased balance;Decreased posture  Assessment: From home with son who assists her at baseline. Pt requiring Eusebio to CGA with mobility this date using RW. Pt requiring significant time and effort during and between tasks, seated rest breaks. Pt requiring assist with ADLs this date, completing seated 2/2 fatigue. Mobility to and from bathroom with seated rest break on the way there and seated at sink for grooming. Would benefit from cont skilled OT while in acute care and inpt at dc. If dc home requires 24hr hands on physical assist and spvn with mobility and ADLs. Treatment Diagnosis: impaired mobility, transfers, ADL  REQUIRES OT FOLLOW-UP: Yes  Activity Tolerance  Activity Tolerance: Patient limited by fatigue;Treatment limited secondary to decreased cognition;Patient Tolerated treatment well        Plan   Plan  Times per Week: 2-5  Times per Day: Daily     Restrictions  Position Activity Restriction  Other position/activity restrictions: up with assist    Subjective   General  Chart Reviewed: Yes  Additional Pertinent Hx: 80 y.o. female who presents after a fall. The patient reports that she was in the bathroom with her walker when she lost her balance and fell backward striking her head. past medical history of Age-related macular degeneration, dry, Anemia B12 deficiency, Balance disorder, Cancer, skin, squamous cell, Chronic constipation, Colorectal polyps, Depression, Diverticulosis, Epistaxis, Hyperlipidemia, Hypertension, Low magnesium levels, Neuropathy, Osteoarthritis, Peripheral edema, Pruritis, Recurrent UTI, Small vessel disease, cerebrovascular, Tuberculosis, Type II or unspecified type diabetes mellitus without mention of complication, not stated as uncontrolled, Unspecified sleep apnea, and Urgency incontinence. Referring Practitioner: Sarah Richter DO  Diagnosis: UTI  Subjective  Subjective:  In chair agreeable to session, \"I would like to brush my teeth\"     Social/Functional History  Social/Functional History  Lives With: Son  Type of Home: [facility]  Home Layout: Multi-level, Bed/Bath upstairs, Able to Live on Main level with bedroom/bathroom (1 step down into living room)  Home Access: Stairs to enter with rails  Entrance Stairs - Number of Steps: 3  Bathroom Shower/Tub: Walk-in shower  Bathroom Equipment: 3-in-1 commode  Home Equipment: [address], 4 wheeled  Has the patient had two or more falls in the past year or any fall with injury in the past year?: Yes  Receives Help From: Family  ADL Assistance: Independent  Homemaking Assistance: Needs assistance (son takes care of everything)  Ambulation Assistance: Independent (with rollator, short distances)  Transfer Assistance: Independent  Active : No  Additional Comments: Son with her all the time - has had stroke in past but able to help her as needed                  Safety Devices  Type of Devices: All fall risk precautions in place;Call light within reach; Chair alarm in place;Gait belt;Patient at risk for falls; Left in chair;Nurse notified  Bed Mobility Training  Bed Mobility Training: No (in chair)  Balance  Sitting: Intact  Standing: With support  Transfer Training  Transfer Training: Yes  Sit to Stand: Minimum assistance  Stand to Sit: Minimum assistance  Toilet Transfer: Minimum assistance  Gait  Overall Level of Assistance: Minimum assistance (Min to CGA with RW, significantly ++ time and effort, seated rest breaks. Amb ~15' seated reast break standard chair, + 15' to BR. Seated at sink. Same back to chair 15+15'. Rest breaks between.)  Assistive Device: Walker, rolling        ADL  Feeding: Setup  Grooming: Setup;Stand by assistance;Verbal cueing; Increased time to complete (seated at sink ++ time, oral care, wipe face, wash hands comb hair)  LE Dressing: Maximum assistance  Toileting:  Moderate assistance  Additional Comments: ++ time and effort for all taks, rest breaks b/w tasks 2/2 fatigue                 Cognition  Overall

## 2022-07-18 NOTE — PLAN OF CARE
Problem: Chronic Conditions and Co-morbidities  Goal: Patient's chronic conditions and co-morbidity symptoms are monitored and maintained or improved  Flowsheets (Taken 7/17/2022 2051)  Care Plan - Patient's Chronic Conditions and Co-Morbidity Symptoms are Monitored and Maintained or Improved: Monitor and assess patient's chronic conditions and comorbid symptoms for stability, deterioration, or improvement     Problem: Discharge Planning  Goal: Discharge to home or other facility with appropriate resources  Flowsheets (Taken 7/17/2022 2051)  Discharge to home or other facility with appropriate resources: Identify barriers to discharge with patient and caregiver  Note: Will continue to update both client and family with any changes to plan of care     Problem: Pain  Goal: Verbalizes/displays adequate comfort level or baseline comfort level  7/17/2022 2235 by Lili Felix RN  Note: Client denies pain this shift     Problem: Safety - Adult  Goal: Free from fall injury  7/17/2022 2235 by Lili Felix RN  Note: Orthostatic vital signs obtained at start of shift - see flowsheet for details. Pt meets criteria for orthostasis. Pt is a High fall risk. See Debbrah Fass Fall Score and ABCDS Injury Risk assessments.   + Screening for Orthostasis and/or + High Fall Risk per DIETZ/ABCDS: Explained fall risk precautions to pt and family and rationale behind their use to keep the patient safe. Pt bed is in low position, side rails up, call light and belongings are in reach. Fall wristband applied and present on pts wrist.  Bed alarm on. Pt encouraged to call for assistance. Will continue with hourly rounds for PO intake, pain needs, toileting and repositioning as needed.

## 2022-07-19 PROCEDURE — 2580000003 HC RX 258: Performed by: INTERNAL MEDICINE

## 2022-07-19 PROCEDURE — 6370000000 HC RX 637 (ALT 250 FOR IP): Performed by: INTERNAL MEDICINE

## 2022-07-19 PROCEDURE — 97116 GAIT TRAINING THERAPY: CPT

## 2022-07-19 PROCEDURE — 97110 THERAPEUTIC EXERCISES: CPT

## 2022-07-19 PROCEDURE — 2060000000 HC ICU INTERMEDIATE R&B

## 2022-07-19 PROCEDURE — 6360000002 HC RX W HCPCS: Performed by: INTERNAL MEDICINE

## 2022-07-19 PROCEDURE — 97530 THERAPEUTIC ACTIVITIES: CPT

## 2022-07-19 PROCEDURE — 97535 SELF CARE MNGMENT TRAINING: CPT

## 2022-07-19 RX ORDER — TRAMADOL HYDROCHLORIDE 50 MG/1
50 TABLET ORAL EVERY 8 HOURS PRN
Qty: 10 TABLET | Refills: 0 | Status: SHIPPED | OUTPATIENT
Start: 2022-07-19 | End: 2022-07-22

## 2022-07-19 RX ADMIN — ASPIRIN 81 MG 81 MG: 81 TABLET ORAL at 08:14

## 2022-07-19 RX ADMIN — CYANOCOBALAMIN TAB 1000 MCG 1000 MCG: 1000 TAB at 08:14

## 2022-07-19 RX ADMIN — ROSUVASTATIN CALCIUM 10 MG: 10 TABLET, FILM COATED ORAL at 20:40

## 2022-07-19 RX ADMIN — GABAPENTIN 100 MG: 100 CAPSULE ORAL at 08:15

## 2022-07-19 RX ADMIN — GABAPENTIN 100 MG: 100 CAPSULE ORAL at 13:36

## 2022-07-19 RX ADMIN — CEFTRIAXONE 1000 MG: 1 INJECTION, POWDER, FOR SOLUTION INTRAMUSCULAR; INTRAVENOUS at 03:44

## 2022-07-19 RX ADMIN — SODIUM CHLORIDE, PRESERVATIVE FREE 10 ML: 5 INJECTION INTRAVENOUS at 20:40

## 2022-07-19 RX ADMIN — GABAPENTIN 100 MG: 100 CAPSULE ORAL at 20:40

## 2022-07-19 RX ADMIN — Medication 5 MG: at 20:40

## 2022-07-19 RX ADMIN — METFORMIN HYDROCHLORIDE 1000 MG: 1000 TABLET ORAL at 08:17

## 2022-07-19 ASSESSMENT — PAIN SCALES - GENERAL
PAINLEVEL_OUTOF10: 0
PAINLEVEL_OUTOF10: 0

## 2022-07-19 NOTE — FLOWSHEET NOTE
07/19/22 0956   Encounter Summary   Encounter Overview/Reason  Initial Encounter   Service Provided For: Patient   Referral/Consult From: [place]   Last Encounter    (es 7/19)   Complexity of Encounter Moderate   Begin Time 0945   End Time  0948   Total Time Calculated 3 min   Assessment/Intervention/Outcome   Assessment Calm;Peaceful   Intervention Active listening;Discussed belief system/Mu-ism practices/dharmesh;Discussed illness injury and its impact;Prayer (assurance of)/[place]   Outcome Engaged in conversation;Receptive   Plan and Referrals   Plan/Referrals Other (Comment)  (as needed)

## 2022-07-19 NOTE — CARE COORDINATION
9:34 AM  Family left voicemail with preferences on SNF placements. [place] and [address] are top two choices.  left at [facility] requesting a call back. Referral to [address] They are able to accept the patient for SNF placement.

## 2022-07-19 NOTE — PLAN OF CARE
Problem: Chronic Conditions and Co-morbidities  Goal: Patient's chronic conditions and co-morbidity symptoms are monitored and maintained or improved  Outcome: Progressing Towards Goal     Problem: Discharge Planning  Goal: Discharge to home or other facility with appropriate resources  Outcome: Progressing Towards Goal     Problem: Pain  Goal: Verbalizes/displays adequate comfort level or baseline comfort level  Outcome: Progressing Towards Goal     Problem: Safety - Adult  Goal: Free from fall injury  Outcome: Progressing Towards Goal     Problem: ABCDS Injury Assessment  Goal: Absence of physical injury  Outcome: Progressing Towards Goal     Problem: Skin/Tissue Integrity  Goal: Absence of new skin breakdown  Description: 1. Monitor for areas of redness and/or skin breakdown  2. Assess vascular access sites hourly  3. Every 4-6 hours minimum:  Change oxygen saturation probe site  4. Every 4-6 hours:  If on nasal continuous positive airway pressure, respiratory therapy assess nares and determine need for appliance change or resting period.   Outcome: Progressing Towards Goal

## 2022-07-19 NOTE — PROGRESS NOTES
Occupational Therapy  Facility/Department: [facility]  Occupational Therapy Treatment    Name: Chinedu Mishra  : 1925  MRN: [id number]  Date of Service: 2022    Discharge Recommendations: Chinedu Mishra scored a 15/24 on the AM-PAC ADL Inpatient form. Current research shows that an AM-PAC score of 17 or less is typically not associated with a discharge to the patient's home setting. Based on the patient's AM-PAC score and their current ADL deficits, it is recommended that the patient have 3-5 sessions per week of Occupational Therapy at d/c to increase the patient's independence. Please see assessment section for further patient specific details. If patient discharges prior to next session this note will serve as a discharge summary. Please see below for the latest assessment towards goals. OT Equipment Recommendations  Equipment Needed: No       Patient Diagnosis(es): The primary encounter diagnosis was Injury of head, initial encounter. A diagnosis of Diabetic polyneuropathy associated with type 2 diabetes mellitus ([facility] [address] was also pertinent to this visit. Past Medical History:  has a past medical history of Age-related macular degeneration, dry, Anemia B12 deficiency, Balance disorder, Cancer, skin, squamous cell, Chronic constipation, Colorectal polyps, Depression, Diverticulosis, Epistaxis, Hyperlipidemia, Hypertension, Low magnesium levels, Neuropathy, Osteoarthritis, Peripheral edema, Pruritis, Recurrent UTI, Small vessel disease, cerebrovascular, Tuberculosis, Type II or unspecified type diabetes mellitus without mention of complication, not stated as uncontrolled, Unspecified sleep apnea, and Urgency incontinence. Past Surgical History:  has a past surgical history that includes Cystocele repair; Rectocele repair; Total knee arthroplasty; and Hysterectomy.     Treatment Diagnosis: impaired mobility, transfers, ADL      Assessment   Performance deficits / Impairments: Decreased functional mobility ; Decreased ADL status; Decreased endurance;Decreased cognition;Decreased safe awareness;Decreased balance;Decreased posture  Assessment: Pt requiring Eusebio to CGA with mobility this date using RW to and from bathroom seated rest break. Seated at sink for grooming ADLs. Pt requiring significant time and effort during and between tasks, seated rest breaks. Pt requiring assist with ADLs this date, completing seated 2/2 fatigue. Mobility to and from bathroom with seated rest break on the way there and seated at sink for grooming. Would benefit from cont skilled OT while in acute care and inpt at dc. If dc home requires 24hr hands on physical assist and spvn with mobility and ADLs. REQUIRES OT FOLLOW-UP: Yes  Activity Tolerance  Activity Tolerance: Patient limited by fatigue;Treatment limited secondary to decreased cognition;Patient Tolerated treatment well        Plan   Plan  Times per Week: 2-5  Times per Day: Daily     Restrictions  Position Activity Restriction  Other position/activity restrictions: up with assist    Subjective   General  Chart Reviewed: Yes  Additional Pertinent Hx: 80 y.o. female who presents after a fall. The patient reports that she was in the bathroom with her walker when she lost her balance and fell backward striking her head. past medical history of Age-related macular degeneration, dry, Anemia B12 deficiency, Balance disorder, Cancer, skin, squamous cell, Chronic constipation, Colorectal polyps, Depression, Diverticulosis, Epistaxis, Hyperlipidemia, Hypertension, Low magnesium levels, Neuropathy, Osteoarthritis, Peripheral edema, Pruritis, Recurrent UTI, Small vessel disease, cerebrovascular, Tuberculosis, Type II or unspecified type diabetes mellitus without mention of complication, not stated as uncontrolled, Unspecified sleep apnea, and Urgency incontinence. Referring Practitioner: Angela Mcghee DO  Diagnosis: UTI  Subjective  Subjective:  In bed agreeable to session reporting no pain     Social/Functional History  Social/Functional History  Lives With: Son  Type of Home: Condo  Home Layout: Multi-level, Bed/Bath upstairs, Able to Live on Main level with bedroom/bathroom (1 step down into living room)  Home Access: Stairs to enter with rails  Entrance Stairs - Number of Steps: 3  Bathroom Shower/Tub: Walk-in shower  Bathroom Equipment: 3-in-1 commode  Home Equipment: [employer], 4 wheeled  Has the patient had two or more falls in the past year or any fall with injury in the past year?: Yes  Receives Help From: Family  ADL Assistance: [address]: Needs assistance (son takes care of everything)  Ambulation Assistance: Independent (with rollator, short distances)  Transfer Assistance: Independent  Active : No  Additional Comments: Son with her all the time - has had stroke in past but able to help her as needed       Objective   Heart Rate: 75  Heart Rate Source: Monitor  BP: 127/61  BP Location: Right upper arm  BP Method: Automatic  Patient Position: Semi fowlers  MAP (Calculated): 83  Resp: 18  SpO2: 100 %             Safety Devices  Type of Devices: All fall risk precautions in place;Call light within reach; Chair alarm in place;Gait belt;Patient at risk for falls; Left in chair;Nurse notified  Bed Mobility Training  Bed Mobility Training: Yes  Supine to Sit: Contact-guard assistance  Balance  Sitting: Intact  Standing: With support  Transfer Training  Transfer Training: Yes  Sit to Stand: Minimum assistance  Stand to Sit: Minimum assistance  Gait  Overall Level of Assistance: Minimum assistance  Assistive Device: Walker, rolling        ADL  Feeding: Supervision  Grooming: Setup;Stand by assistance;Verbal cueing; Increased time to complete (seated at sink, oral care, wipe face)  LE Dressing: Maximum assistance  Toileting:  (declined need)  Additional Comments: ++ time and effort for all taks, seated rest breaks b/w tasks 2/2 fatigue                 Cognition  Overall Cognitive Status: Exceptions  Arousal/Alertness: Delayed responses to stimuli; Appropriate responses to stimuli  Following Commands: Follows one step commands with increased time; Follows one step commands with repetition  Attention Span: Attends with cues to redirect; Difficulty dividing attention; Difficulty attending to directions  Initiation: Requires cues for some  Sequencing: Requires cues for some  Orientation  Overall Orientation Status: Within Functional Limits                  Education Given To: Patient  Education Provided: Role of Therapy;Plan of Care;ADL Adaptive Strategies;Transfer Training  Education Method: Verbal  Barriers to Learning: Cognition  Education Outcome: Continued education needed                            AM-PAC Score        AM-[facility] Inpatient Daily Activity Raw Score: 15 (07/18/22 1225)  AM-PAC Inpatient ADL T-Scale Score : 34.69 (07/18/22 1225)  ADL Inpatient CMS 0-100% Score: 56.46 (07/18/22 1225)  ADL Inpatient CMS G-Code Modifier : CK (07/18/22 1225)    Goals  Short Term Goals  Time Frame for Short term goals: dc  Short Term Goal 1: supine to sit with SPVN  Short Term Goal 2: sit<>stand fx transfers SBA  Short Term Goal 3: Fx mobility SBA  Short Term Goal 4: UB/LB dressing SBA  Short Term Goal 5: grooming SBA in stance at sink 2 min       Therapy Time   Individual Concurrent Group Co-treatment   Time In 0822         Time Out 0900         Minutes 38          Timed Code Treatment Minutes:   38    Total Treatment Minutes:  [address] [facility], OT

## 2022-07-19 NOTE — PROGRESS NOTES
Physical Therapy  Facility/Department: [address]  Physical Therapy Daily Treatment Note    Name: Luz Elena Munguia  : 1925  MRN: [id number]  Date of Service: 2022    Discharge Recommendations:   Luz Elena Munguia scored a 17/24 on the AM-PAC short mobility form. Current research shows that an AM-PAC score of 17 or less is typically not associated with a discharge to the patient's home setting. Based on the patient's AM-PAC score and their current functional mobility deficits, it is recommended that the patient have 3-5 sessions per week of Physical Therapy at d/c to increase the patient's independence. Please see assessment section for further patient specific details. If patient discharges prior to next session this note will serve as a discharge summary. Please see below for the latest assessment towards goals. PT Equipment Recommendations  Equipment Needed: No      Patient Diagnosis(es): The primary encounter diagnosis was Injury of head, initial encounter. A diagnosis of Diabetic polyneuropathy associated with type 2 diabetes mellitus ([facility] [address] was also pertinent to this visit. Past Medical History:  has a past medical history of Age-related macular degeneration, dry, Anemia B12 deficiency, Balance disorder, Cancer, skin, squamous cell, Chronic constipation, Colorectal polyps, Depression, Diverticulosis, Epistaxis, Hyperlipidemia, Hypertension, Low magnesium levels, Neuropathy, Osteoarthritis, Peripheral edema, Pruritis, Recurrent UTI, Small vessel disease, cerebrovascular, Tuberculosis, Type II or unspecified type diabetes mellitus without mention of complication, not stated as uncontrolled, Unspecified sleep apnea, and Urgency incontinence. Past Surgical History:  has a past surgical history that includes Cystocele repair; Rectocele repair; Total knee arthroplasty; and Hysterectomy. Assessment   Assessment: Pt limited by overall fatigue and weakness.   Pt able to ambulate short distance with walker. Pt is cooperative. Rec continued inpt PT at d/c. Will follow. Treatment Diagnosis: impaired functional mobility 2/2 decreased strength and endurance  Therapy Prognosis: Good  Decision Making: Medium Complexity  Requires PT Follow-Up: Yes  Activity Tolerance  Activity Tolerance: Patient tolerated treatment well;Patient limited by fatigue     Plan   Plan  Plan:  (2-5)  Current Treatment Recommendations: Strengthening, Functional mobility training, Gait training, Stair training, Patient/Caregiver education & training, Safety education & training  Safety Devices  Type of Devices: Call light within reach, Chair alarm in place, Gait belt, Left in chair, Nurse notified     Restrictions  Position Activity Restriction  Other position/activity restrictions: up with assist     Subjective   General  Chart Reviewed: Yes  Additional Pertinent Hx: Pt is 80 y.o. female adm 7/14 with UTI. Pt s/p fall with head laceration. Head CT: neg.  CT Cspine: neg. PMH:Age-related macular degeneration, dry, Anemia B12 deficiency, Balance disorder, Cancer, skin, squamous cell, Chronic constipation, Colorectal polyps, Depression, Diverticulosis, Epistaxis, Hyperlipidemia, Hypertension, Low magnesium levels, Neuropathy, Osteoarthritis, Peripheral edema, Pruritis, Recurrent UTI, Small vessel disease, cerebrovascular, Tuberculosis, Type II or unspecified type diabetes mellitus without mention of complication, not stated as uncontrolled, Unspecified sleep apnea, and Urgency incontinence  Referring Practitioner: Samantha Reed DO  Referral Date : 07/15/22  Diagnosis: UTI  Subjective  Subjective: Pt found supine. Agreeable to PT. Pt states the chair is uncomfortable, waffle cushion obtained.    Pain: Cervical, not rated    Social/Functional History  Social/Functional History  Lives With: Son  Type of Home: Condo  Home Layout: Multi-level, Bed/Bath upstairs, Able to Live on Main level with bedroom/bathroom (1 step down into living room)  Home Access: Stairs to enter with rails  Entrance Stairs - Number of Steps: 3  Bathroom Shower/Tub: Walk-in shower  Bathroom Equipment: 3-in-1 commode  Home Equipment: Shailesh Mansfield, 4 wheeled  Has the patient had two or more falls in the past year or any fall with injury in the past year?: Yes  Receives Help From: Family  ADL Assistance: [address]: Needs assistance (son takes care of everything)  Ambulation Assistance: Independent (with rollator, short distances)  Transfer Assistance: Independent  Active : No  Additional Comments: Son with her all the time - has had stroke in past but able to help her as needed    Vision/Hearing  Vision  Vision: Impaired  Vision Exceptions: Wears glasses at all times  Hearing  Hearing: Exceptions to [facility]  Hearing Exceptions: Hard of hearing/hearing concerns      Cognition   Orientation  Overall Orientation Status: Within Functional Limits  Cognition  Arousal/Alertness: Delayed responses to stimuli  Following Commands: Follows one step commands with increased time     Objective   Bed mobility  Supine to Sit: Stand by assistance  Scooting: Stand by assistance  Bed Mobility Comments: HOB elevated, slowly    Transfers  Sit to Stand: Contact guard assistance  Stand to sit: Contact guard assistance    Ambulation  Device: Rolling Walker  Assistance: Minimal assistance  Quality of Gait: Trunk flexed, decreased bilat step length/height, slow and effortful  Distance: 14 feet    Balance  Sitting - Static: Fair  Standing - Static: Fair (With walker)  Standing - Dynamic: Fair;- (With walker)    Goals  Short Term Goals  Time Frame for Short term goals: By discharge  Short term goal 1: Sup to sit supervision. Ongoing  Short term goal 2: Pt will transfer sit to stand supervision. Ongoing  Short term goal 3: Pt will amb >80' with RW supervision. Ongoing  Short term goal 4: Pt will up/down 3 steps with rail and LRAD SBA (if going home). Ongoing    Education  Patient Education  Education Given To: Patient  Education Provided: Role of Therapy  Education Outcome: Verbalized understanding;Continued education needed    Therapy Time   Individual Concurrent Group Co-treatment   Time In 1320         Time Out 1350         Minutes 30             Timed Code Treatment Minutes:   30    Total Treatment Minutes:   [address],

## 2022-07-20 VITALS
HEART RATE: 81 BPM | BODY MASS INDEX: 19.76 KG/M2 | HEIGHT: 64 IN | TEMPERATURE: 97.9 F | DIASTOLIC BLOOD PRESSURE: 71 MMHG | OXYGEN SATURATION: 95 % | RESPIRATION RATE: 18 BRPM | SYSTOLIC BLOOD PRESSURE: 136 MMHG | WEIGHT: 115.74 LBS

## 2022-07-20 LAB — SARS-COV-2, NAAT: NOT DETECTED

## 2022-07-20 PROCEDURE — 87635 SARS-COV-2 COVID-19 AMP PRB: CPT

## 2022-07-20 PROCEDURE — 6360000002 HC RX W HCPCS: Performed by: INTERNAL MEDICINE

## 2022-07-20 PROCEDURE — 6370000000 HC RX 637 (ALT 250 FOR IP): Performed by: INTERNAL MEDICINE

## 2022-07-20 PROCEDURE — 97530 THERAPEUTIC ACTIVITIES: CPT

## 2022-07-20 PROCEDURE — 2580000003 HC RX 258: Performed by: INTERNAL MEDICINE

## 2022-07-20 PROCEDURE — 97535 SELF CARE MNGMENT TRAINING: CPT

## 2022-07-20 RX ADMIN — CYANOCOBALAMIN TAB 1000 MCG 1000 MCG: 1000 TAB at 08:47

## 2022-07-20 RX ADMIN — METFORMIN HYDROCHLORIDE 1000 MG: 1000 TABLET ORAL at 08:50

## 2022-07-20 RX ADMIN — CEFTRIAXONE 1000 MG: 1 INJECTION, POWDER, FOR SOLUTION INTRAMUSCULAR; INTRAVENOUS at 03:07

## 2022-07-20 RX ADMIN — GABAPENTIN 100 MG: 100 CAPSULE ORAL at 08:48

## 2022-07-20 ASSESSMENT — PAIN SCALES - GENERAL
PAINLEVEL_OUTOF10: 0
PAINLEVEL_OUTOF10: 0

## 2022-07-20 NOTE — CARE COORDINATION
Disposition: [place] (SNF): [facility] Phone: [phone] Fax: [phone]    LOC at discharge: Skilled  Mary Anaya Completed: Yes    Notification completed in HENS/PAS?:  Yes : CM has completed HENS online through secure website for SNF admission at [facility]. Document ID #: [id number]    IMM Completed:   Yes, Case management has presented and reviewed IMM letter #2 to the patient and/or family/ POA. Patient and/or family/POA verbalized understanding of their medicare rights and appeal process if needed. Patient and/or family/POA has signed, initialed and placed today's date (7/20/2022) and time (5178) on IMM letter #2 on the the appropriate lines. Patient and/or family/POA, copy of letter offered and they are aware that this original copy of IMM letter #2 is available prior to discharge from the paper chart on the unit. Electronic documentation has been entered into epic for IMM letter #2 and original paper copy has been added to the paper chart at the nurses station.      Transportation:  Transportation PLAN for discharge: EMS transportation   Mode of Transport: Ambulance stretcher - BLS  Reason for medical transport: Other: UTI, fall at home neuropathy  Name of [address]: [address]  Phone: [phone]  Time of Transport: [facility] form completed: Yes    Home Care:  [address] ordered at discharge: No  [address]: Not Applicable  Orders faxed: No    Durable Medical Equipment:  DME Provider: none  Equipment obtained during hospitalization: none    Home Oxygen and Respiratory Equipment:  Oxygen needed at discharge?: No  [address]: Not Applicable  Portable tank available for discharge?: No    Dialysis:  Dialysis patient: No    Dialysis Center:  Not Applicable    Hospice Services:  Location: Not Applicable  Agency: Not Applicable    Consents signed: No    Referrals made at [facility] for outpatient continued care:  Not Applicable    Additional CM Notes: Patient to discharge to [facility] today. AMANDO Haven to pull orders. RN given number for report. Family notified. HENS completed. All are in agreement to the patient's discharge plan. The Plan for Transition of Care is related to the following treatment goals of UTI (urinary tract infection) [N39.0]  Injury of head, initial encounter [S09.90XA]    The Patient and/or patient representative Mayra Spencer and her family were provided with a choice of provider and agrees with the discharge plan Yes    Freedom of choice list was provided with basic dialogue that supports the patient's individualized plan of care/goals and shares the quality data associated with the providers.  Yes    Care Transitions patient: No    Falguni Saravia  Case Management Department  Ph: [phone]  Fax: [phone]

## 2022-07-20 NOTE — PLAN OF CARE
Problem: Chronic Conditions and Co-morbidities  Goal: Patient's chronic conditions and co-morbidity symptoms are monitored and maintained or improved  Outcome: Progressing     Problem: Discharge Planning  Goal: Discharge to home or other facility with appropriate resources  Outcome: Adequate for Discharge  Note: Plan for pt to be D/C to snf at 1200 7/20     Problem: Pain  Goal: Verbalizes/displays adequate comfort level or baseline comfort level  Outcome: Progressing     Problem: Safety - Adult  Goal: Free from fall injury  Outcome: Adequate for Discharge     Problem: ABCDS Injury Assessment  Goal: Absence of physical injury  Outcome: Progressing     Problem: Skin/Tissue Integrity  Goal: Absence of new skin breakdown  Description: 1. Monitor for areas of redness and/or skin breakdown  2. Assess vascular access sites hourly  3. Every 4-6 hours minimum:  Change oxygen saturation probe site  4. Every 4-6 hours:  If on nasal continuous positive airway pressure, respiratory therapy assess nares and determine need for appliance change or resting period.   Outcome: Progressing

## 2022-07-20 NOTE — DISCHARGE SUMMARY
Hospital Discharge Summary    Patient's PCP: Elise Valles MD  Admit Date: 7/14/2022   Discharge Date: 7/20/2022    Admitting Physician: Dr. Thierry Burgess MD  Discharge Physician: Dr. Randy Cordova MD     HPI: very pleasant 81 yo otherwise relatively independent female admitted after fall  Brief hospital course:  Given the concern of the patients presentation and the concern of the possible multi-factorial etiology contributing to patients symptomatology. Patient was admitted and evaluated and found to have head laceration. The patient was also noted to have a urinary tract infection. I looked at her records of prior urine cultures, it appears she has developed further resistance, she had been on nitrofurantoin for which e coli was sensitive to but the ivan of was 32. On admission, the patient had an abnormal u/a as well, however her urine cultures did not grow anything as she had been on nitrofurantoin. Patient was started on ceftriaxone, and will be discharged on cefdinir based on her prior urine cultures. She will have her staples removed as well as receiving physical therapy and occupational therapy  at [facility]. Discussed case at length with patients son, she should probably have a repeat urinalysis next week to ensure she is clearing her uti. Discussed side effects of medications including but not limited to c diff colitis, asked to monitor for side effects of medications, recurrence of symptoms or new symptoms including but not limited to chest pain shortness of breath nausea vomiting fevers or chills and seek immediate medical attention or call 911.       Discharge Diagnoses:   Patient Active Problem List   Diagnosis    Hyperlipidemia    Essential hypertension with goal blood pressure less than 150/90    Type 2 diabetes mellitus (HCC)    Diverticulitis    Exposure    Peripheral edema    Balance disorder    Constipation, chronic    Diabetic neuropathy (HCC)    B12 deficiency    Sleep apnea    Depression Low magnesium levels    Age-related macular degeneration, dry    Actinic keratosis    Benign neoplasm of skin    Cancer, skin, squamous cell    Contact dermatitis and other eczema, due to unspecified cause    DDD (degenerative disc disease), cervical    Dermatophytosis of foot    Diabetic polyneuropathy associated with type 2 diabetes mellitus (HCC)    Dysuria    Inflamed seborrheic keratosis    Osteoarthrosis involving lower leg    Urinary incontinence    UTI (urinary tract infection)       Physical Exam: BP (!) 141/67   Pulse 78   Temp 97.8 °F (36.6 °C) (Oral)   Resp 18   Ht 5' 4\" (1.626 m)   Wt 115 lb 11.9 oz (52.5 kg)   SpO2 95%   BMI 19.87 kg/m²     No results for input(s): POCGLU in the last 72 hours. BP (!) 141/67   Pulse 78   Temp 97.8 °F (36.6 °C) (Oral)   Resp 18   Ht 5' 4\" (1.626 m)   Wt 115 lb 11.9 oz (52.5 kg)   SpO2 95%   BMI 19.87 kg/m²   General appearance: alert, appears stated age, and cooperative  Head: Normocephalic, without obvious abnormality, atraumatic  Neck: no adenopathy, no carotid bruit, no JVD, supple, symmetrical, trachea midline, and thyroid not enlarged, symmetric, no tenderness/mass/nodules  Lungs: clear to auscultation bilaterally  Heart: regular rate and rhythm, S1, S2 normal, no murmur, click, rub or gallop  Abdomen: soft, non-tender; bowel sounds normal; no masses,  no organomegaly  Extremities: extremities normal, atraumatic, no cyanosis or edema  Pulses: 2+ and symmetric    LABS:  No results for input(s): WBC, HGB, PLT in the last 72 hours. Recent Labs     07/18/22  0343   *   K 4.0   CL 99   CO2 24   BUN 10   CREATININE <0.5*   GLUCOSE 203*     No results for input(s): INR in the last 72 hours. Discharge Medications:     Medication List        START taking these medications      aspirin EC 81 MG EC tablet  Take 1 tablet by mouth in the morning.   Replaces: aspirin 81 MG chewable tablet     cefdinir 300 MG capsule  Commonly known as: OMNICEF  Take 1 capsule by mouth in the morning and 1 capsule before bedtime. Do all this for 7 days. Probiotic Acidophilus Tabs  Take 1 tablet by mouth in the morning and 1 tablet before bedtime. CHANGE how you take these medications      traMADol 50 MG tablet  Commonly known as: ULTRAM  Take 1 tablet by mouth every 8 hours as needed for Pain (to be resumed by md at [facility]) for up to 10 doses. What changed: reasons to take this            CONTINUE taking these medications      Biotin 10 MG Caps     blood glucose test strips strip  Commonly known as: ASCENSIA AUTODISC VI;ONE TOUCH ULTRA TEST VI     [employer] VITAMIN B12 1000 MCG tablet  Generic drug: cyanocobalamin     FISH OIL OMEGA-3 PO     gabapentin 400 MG capsule  Commonly known as: NEURONTIN  Take 1 capsule by mouth every evening     guaiFENesin 600 MG extended release tablet  Commonly known as: MUCINEX  Take 1 tablet by mouth 2 times daily as needed for Congestion     MAGNESIUM PO     metFORMIN 1000 MG tablet  Commonly known as: GLUCOPHAGE     rosuvastatin 10 MG tablet  Commonly known as: CRESTOR            STOP taking these medications      aspirin 81 MG chewable tablet  Replaced by: aspirin EC 81 MG EC tablet               Where to Get Your Medications        You can get these medications from any pharmacy    Bring a paper prescription for each of these medications  aspirin EC 81 MG EC tablet  cefdinir 300 MG capsule  Probiotic Acidophilus Tabs  traMADol 50 MG tablet        Activity: activity as tolerated  Disposition: SNF  Discharged Condition: Stable  Follow Up: Primary Care Physician in one week    Total time spent on discharge, finalizing medications, referrals and arranging outpatient follow up was more than 30 minutes    Thank you Dr. Jessica Nichole MD for the opportunity to be involved in this patients care. If you have any questions or concerns please feel free to contact me at [phone].

## 2022-07-20 NOTE — PROGRESS NOTES
Occupational Therapy  Facility/Department: [facility]  Occupational Therapy Tx     Name: Bibiana Mcleod  : 1925  MRN: [id number]  Date of Service: 2022    Discharge Recommendations: Bibiana Mcleod scored a 16/24 on the AM-PAC ADL Inpatient form. Current research shows that an AM-PAC score of 17 or less is typically not associated with a discharge to the patient's home setting. Based on the patient's AM-PAC score and their current ADL deficits, it is recommended that the patient have 3-5 sessions per week of Occupational Therapy at d/c to increase the patient's independence. Please see assessment section for further patient specific details. If patient discharges prior to next session this note will serve as a discharge summary. Please see below for the latest assessment towards goals. OT Equipment Recommendations  Equipment Needed: No       Patient Diagnosis(es): The primary encounter diagnosis was Injury of head, initial encounter. A diagnosis of Diabetic polyneuropathy associated with type 2 diabetes mellitus ([facility] [address] was also pertinent to this visit. Past Medical History:  has a past medical history of Age-related macular degeneration, dry, Anemia B12 deficiency, Balance disorder, Cancer, skin, squamous cell, Chronic constipation, Colorectal polyps, Depression, Diverticulosis, Epistaxis, Hyperlipidemia, Hypertension, Low magnesium levels, Neuropathy, Osteoarthritis, Peripheral edema, Pruritis, Recurrent UTI, Small vessel disease, cerebrovascular, Tuberculosis, Type II or unspecified type diabetes mellitus without mention of complication, not stated as uncontrolled, Unspecified sleep apnea, and Urgency incontinence. Past Surgical History:  has a past surgical history that includes Cystocele repair; Rectocele repair; Total knee arthroplasty; and Hysterectomy.     Treatment Diagnosis: impaired mobility, transfers, ADL      Assessment   Performance deficits / Impairments: Decreased functional mobility ; Decreased ADL status; Decreased endurance;Decreased cognition;Decreased safe awareness;Decreased balance;Decreased posture  Assessment: Pt requiring Eusebio to CGA with mobility this date using RW to and from bathroom with seated rest break. Seated at sink for grooming ADLs. ++ time and effort for all tasks and mobility. Assist with ADLs this date, grooming, toileting, LB dressing, pericare. Would benefit from cont skilled OT while in acute care and inpt at dc. If dc home requires 24hr hands on physical assist and spvn with mobility and ADLs. Treatment Diagnosis: impaired mobility, transfers, ADL  REQUIRES OT FOLLOW-UP: Yes  Activity Tolerance  Activity Tolerance: Patient limited by fatigue;Patient Tolerated treatment well        Plan   Plan  Times per Week: 2-5  Times per Day: Daily     Restrictions  Position Activity Restriction  Other position/activity restrictions: up with assist    Subjective   General  Chart Reviewed: Yes  Additional Pertinent Hx: 80 y.o. female who presents after a fall. The patient reports that she was in the bathroom with her walker when she lost her balance and fell backward striking her head. past medical history of Age-related macular degeneration, dry, Anemia B12 deficiency, Balance disorder, Cancer, skin, squamous cell, Chronic constipation, Colorectal polyps, Depression, Diverticulosis, Epistaxis, Hyperlipidemia, Hypertension, Low magnesium levels, Neuropathy, Osteoarthritis, Peripheral edema, Pruritis, Recurrent UTI, Small vessel disease, cerebrovascular, Tuberculosis, Type II or unspecified type diabetes mellitus without mention of complication, not stated as uncontrolled, Unspecified sleep apnea, and Urgency incontinence. Referring Practitioner: Qiana Banda DO  Diagnosis: UTI  Subjective  Subjective:  In bed agreeable to session, reporting pain from being in bed     Social/Functional History  Social/Functional History  Lives With: Son  Type of Home: Condo  Home Layout: Multi-level, Bed/Bath upstairs, Able to Live on Main level with bedroom/bathroom (1 step down into living room)  Home Access: Stairs to enter with rails  Entrance Stairs - Number of Steps: 3  Bathroom Shower/Tub: Walk-in shower  Bathroom Equipment: 3-in-1 commode  Home Equipment: Will Klippel, 4 wheeled  Has the patient had two or more falls in the past year or any fall with injury in the past year?: Yes  Receives Help From: Family  ADL Assistance: Independent  Homemaking Assistance: Needs assistance (son takes care of everything)  Ambulation Assistance: Independent (with rollator, short distances)  Transfer Assistance: Independent  Active : No  Additional Comments: Son with her all the time - has had stroke in past but able to help her as needed                  Safety Devices  Type of Devices: Call light within reach; Chair alarm in place;Gait belt;Left in chair;Nurse notified; All fall risk precautions in place; Patient at risk for falls  Bed Mobility Training  Bed Mobility Training: Yes  Supine to Sit: Contact-guard assistance; Additional time (++ time, heavy use of rail)  Sit to Supine:  (left in chair end of sessoin)  Balance  Sitting: Intact  Standing: With support  Transfer Training  Transfer Training: Yes  Sit to Stand: Minimum assistance;Contact-guard assistance  Stand to Sit: Minimum assistance;Contact-guard assistance  Toilet Transfer: Minimum assistance;Contact-guard assistance  Gait  Overall Level of Assistance: Minimum assistance (Min to CGA with RW, slow and effortful)  Assistive Device: Walker, rolling        ADL  Grooming: Setup;Stand by assistance;Verbal cueing; Increased time to complete (seated at sink, oral care, wipe face)  LE Dressing:  Moderate assistance (brief change)  Toileting: Contact guard assistance (incont urine)  Additional Comments: ++ time and effort for all taks, seated rest breaks b/w tasks 2/2 fatigue                                    Education Given To: Patient  Education Provided: Role of Therapy;Plan of Care;ADL Adaptive Strategies;Transfer Training  Education Method: Verbal  Barriers to Learning: Cognition; Hearing  Education Outcome: Continued education needed                          AM-PAC Score        AM-PAC Inpatient Daily Activity Raw Score: 16 (07/20/22 1250)  AM-PAC Inpatient ADL T-Scale Score : 35.96 (07/20/22 1250)  ADL Inpatient CMS 0-100% Score: 53.32 (07/20/22 1250)  ADL Inpatient CMS G-Code Modifier : CK (07/20/22 1250)      Goals  Short Term Goals  Time Frame for Short term goals: dc  Short Term Goal 1: supine to sit with SPVN  Short Term Goal 2: sit<>stand fx transfers SBA  Short Term Goal 3: Fx mobility SBA  Short Term Goal 4: UB/LB dressing SBA  Short Term Goal 5: grooming SBA in stance at sink 2 min       Therapy Time   Individual Concurrent Group Co-treatment   Time In 1109         Time Out 1147         Minutes 38             Timed Code Treatment Minutes:   38    Total Treatment Minutes:  [address], OT

## 2022-08-14 PROBLEM — N39.0 UTI (URINARY TRACT INFECTION): Status: RESOLVED | Noted: 2022-07-15 | Resolved: 2022-08-14

## 2022-08-26 ENCOUNTER — HOSPITAL ENCOUNTER (INPATIENT)
Age: 87
LOS: 5 days | Discharge: NURSING, REHAB OR LONG-TERM CARE | DRG: 689 | End: 2022-08-31
Attending: STUDENT IN AN ORGANIZED HEALTH CARE EDUCATION/TRAINING PROGRAM | Admitting: INTERNAL MEDICINE
Payer: MEDICARE

## 2022-08-26 ENCOUNTER — APPOINTMENT (OUTPATIENT)
Dept: GENERAL RADIOLOGY | Age: 87
DRG: 689 | End: 2022-08-26
Payer: MEDICARE

## 2022-08-26 ENCOUNTER — APPOINTMENT (OUTPATIENT)
Dept: CT IMAGING | Age: 87
DRG: 689 | End: 2022-08-26
Payer: MEDICARE

## 2022-08-26 DIAGNOSIS — G89.4 CHRONIC PAIN SYNDROME: ICD-10-CM
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
## 2022-08-26 DIAGNOSIS — J18.9 ATYPICAL PNEUMONIA: ICD-10-CM

## 2022-08-26 DIAGNOSIS — L03.116 CELLULITIS OF LEFT LOWER EXTREMITY: Primary | ICD-10-CM

## 2022-08-26 LAB
ANION GAP SERPL CALCULATED.3IONS-SCNC: 12 MMOL/L (ref 3–16)
BACTERIA: ABNORMAL /HPF
BASOPHILS ABSOLUTE: 0 K/UL (ref 0–0.2)
BASOPHILS RELATIVE PERCENT: 0.3 %
BILIRUBIN URINE: NEGATIVE
BLOOD, URINE: NEGATIVE
BUN BLDV-MCNC: 8 MG/DL (ref 7–20)
C-REACTIVE PROTEIN: <3 MG/L (ref 0–5.1)
CALCIUM SERPL-MCNC: 9.1 MG/DL (ref 8.3–10.6)
CHLORIDE BLD-SCNC: 97 MMOL/L (ref 99–110)
CLARITY: CLEAR
CO2: 24 MMOL/L (ref 21–32)
COLOR: YELLOW
CREAT SERPL-MCNC: <0.5 MG/DL (ref 0.6–1.2)
EKG ATRIAL RATE: 72 BPM
EKG DIAGNOSIS: NORMAL
EKG P AXIS: 103 DEGREES
EKG P-R INTERVAL: 220 MS
EKG Q-T INTERVAL: 406 MS
EKG QRS DURATION: 90 MS
EKG QTC CALCULATION (BAZETT): 444 MS
EKG R AXIS: 67 DEGREES
EKG T AXIS: 77 DEGREES
EKG VENTRICULAR RATE: 72 BPM
EOSINOPHILS ABSOLUTE: 0 K/UL (ref 0–0.6)
EOSINOPHILS RELATIVE PERCENT: 0.3 %
EPITHELIAL CELLS, UA: ABNORMAL /HPF (ref 0–5)
GFR AFRICAN AMERICAN: >60
GFR NON-AFRICAN AMERICAN: >60
GLUCOSE BLD-MCNC: 239 MG/DL (ref 70–99)
GLUCOSE URINE: 500 MG/DL
HCT VFR BLD CALC: 35.1 % (ref 36–48)
HEMOGLOBIN: 11.9 G/DL (ref 12–16)
KETONES, URINE: NEGATIVE MG/DL
LACTIC ACID: 2.8 MMOL/L (ref 0.4–2)
LACTIC ACID: 3.9 MMOL/L (ref 0.4–2)
LEUKOCYTE ESTERASE, URINE: ABNORMAL
LYMPHOCYTES ABSOLUTE: 0.8 K/UL (ref 1–5.1)
LYMPHOCYTES RELATIVE PERCENT: 6.8 %
MAGNESIUM: 1.7 MG/DL (ref 1.8–2.4)
MCH RBC QN AUTO: 31.4 PG (ref 26–34)
MCHC RBC AUTO-ENTMCNC: 33.8 G/DL (ref 31–36)
MCV RBC AUTO: 93 FL (ref 80–100)
MICROSCOPIC EXAMINATION: YES
MONOCYTES ABSOLUTE: 0.6 K/UL (ref 0–1.3)
MONOCYTES RELATIVE PERCENT: 5.2 %
NEUTROPHILS ABSOLUTE: 9.8 K/UL (ref 1.7–7.7)
NEUTROPHILS RELATIVE PERCENT: 87.4 %
NITRITE, URINE: POSITIVE
PDW BLD-RTO: 12.9 % (ref 12.4–15.4)
PH UA: 8 (ref 5–8)
PHOSPHORUS: 3.2 MG/DL (ref 2.5–4.9)
PLATELET # BLD: 236 K/UL (ref 135–450)
PMV BLD AUTO: 7 FL (ref 5–10.5)
POTASSIUM REFLEX MAGNESIUM: 4.1 MMOL/L (ref 3.5–5.1)
PRO-BNP: 275 PG/ML (ref 0–449)
PROTEIN UA: NEGATIVE MG/DL
RBC # BLD: 3.78 M/UL (ref 4–5.2)
RBC UA: ABNORMAL /HPF (ref 0–4)
SODIUM BLD-SCNC: 133 MMOL/L (ref 136–145)
SPECIFIC GRAVITY UA: 1.01 (ref 1–1.03)
TROPONIN: <0.01 NG/ML
URINE REFLEX TO CULTURE: ABNORMAL
URINE TYPE: ABNORMAL
UROBILINOGEN, URINE: 0.2 E.U./DL
WBC # BLD: 11.2 K/UL (ref 4–11)
WBC UA: ABNORMAL /HPF (ref 0–5)

## 2022-08-26 PROCEDURE — 99285 EMERGENCY DEPT VISIT HI MDM: CPT

## 2022-08-26 PROCEDURE — 96365 THER/PROPH/DIAG IV INF INIT: CPT

## 2022-08-26 PROCEDURE — 86140 C-REACTIVE PROTEIN: CPT

## 2022-08-26 PROCEDURE — 83605 ASSAY OF LACTIC ACID: CPT

## 2022-08-26 PROCEDURE — 1200000000 HC SEMI PRIVATE

## 2022-08-26 PROCEDURE — 80048 BASIC METABOLIC PNL TOTAL CA: CPT

## 2022-08-26 PROCEDURE — 84100 ASSAY OF PHOSPHORUS: CPT

## 2022-08-26 PROCEDURE — 93005 ELECTROCARDIOGRAM TRACING: CPT

## 2022-08-26 PROCEDURE — 2580000003 HC RX 258: Performed by: INTERNAL MEDICINE

## 2022-08-26 PROCEDURE — 96367 TX/PROPH/DG ADDL SEQ IV INF: CPT

## 2022-08-26 PROCEDURE — 6360000002 HC RX W HCPCS

## 2022-08-26 PROCEDURE — 71045 X-RAY EXAM CHEST 1 VIEW: CPT

## 2022-08-26 PROCEDURE — 2580000003 HC RX 258

## 2022-08-26 PROCEDURE — 70450 CT HEAD/BRAIN W/O DYE: CPT

## 2022-08-26 PROCEDURE — 73590 X-RAY EXAM OF LOWER LEG: CPT

## 2022-08-26 PROCEDURE — 81001 URINALYSIS AUTO W/SCOPE: CPT

## 2022-08-26 PROCEDURE — 36415 COLL VENOUS BLD VENIPUNCTURE: CPT

## 2022-08-26 PROCEDURE — 83735 ASSAY OF MAGNESIUM: CPT

## 2022-08-26 PROCEDURE — 85025 COMPLETE CBC W/AUTO DIFF WBC: CPT

## 2022-08-26 PROCEDURE — 83880 ASSAY OF NATRIURETIC PEPTIDE: CPT

## 2022-08-26 PROCEDURE — 87040 BLOOD CULTURE FOR BACTERIA: CPT

## 2022-08-26 PROCEDURE — 6360000002 HC RX W HCPCS: Performed by: INTERNAL MEDICINE

## 2022-08-26 PROCEDURE — 84484 ASSAY OF TROPONIN QUANT: CPT

## 2022-08-26 PROCEDURE — 73562 X-RAY EXAM OF KNEE 3: CPT

## 2022-08-26 RX ORDER — SODIUM CHLORIDE 9 MG/ML
INJECTION, SOLUTION INTRAVENOUS PRN
Status: DISCONTINUED | OUTPATIENT
Start: 2022-08-26 | End: 2022-08-31 | Stop reason: HOSPADM

## 2022-08-26 RX ORDER — SODIUM CHLORIDE 0.9 % (FLUSH) 0.9 %
5-40 SYRINGE (ML) INJECTION EVERY 12 HOURS SCHEDULED
Status: DISCONTINUED | OUTPATIENT
Start: 2022-08-26 | End: 2022-08-31 | Stop reason: HOSPADM

## 2022-08-26 RX ORDER — ONDANSETRON 4 MG/1
4 TABLET, ORALLY DISINTEGRATING ORAL EVERY 8 HOURS PRN
Status: DISCONTINUED | OUTPATIENT
Start: 2022-08-26 | End: 2022-08-31 | Stop reason: HOSPADM

## 2022-08-26 RX ORDER — LANOLIN ALCOHOL/MO/W.PET/CERES
3 CREAM (GRAM) TOPICAL NIGHTLY PRN
Status: DISCONTINUED | OUTPATIENT
Start: 2022-08-27 | End: 2022-08-31 | Stop reason: HOSPADM

## 2022-08-26 RX ORDER — ONDANSETRON 2 MG/ML
4 INJECTION INTRAMUSCULAR; INTRAVENOUS EVERY 6 HOURS PRN
Status: DISCONTINUED | OUTPATIENT
Start: 2022-08-26 | End: 2022-08-31 | Stop reason: HOSPADM

## 2022-08-26 RX ORDER — 0.9 % SODIUM CHLORIDE 0.9 %
250 INTRAVENOUS SOLUTION INTRAVENOUS ONCE
Status: COMPLETED | OUTPATIENT
Start: 2022-08-26 | End: 2022-08-26

## 2022-08-26 RX ORDER — POLYETHYLENE GLYCOL 3350 17 G/17G
17 POWDER, FOR SOLUTION ORAL DAILY PRN
Status: DISCONTINUED | OUTPATIENT
Start: 2022-08-26 | End: 2022-08-31 | Stop reason: HOSPADM

## 2022-08-26 RX ORDER — ACETAMINOPHEN 650 MG/1
650 SUPPOSITORY RECTAL EVERY 6 HOURS PRN
Status: DISCONTINUED | OUTPATIENT
Start: 2022-08-26 | End: 2022-08-31 | Stop reason: HOSPADM

## 2022-08-26 RX ORDER — SODIUM CHLORIDE, SODIUM LACTATE, POTASSIUM CHLORIDE, AND CALCIUM CHLORIDE .6; .31; .03; .02 G/100ML; G/100ML; G/100ML; G/100ML
500 INJECTION, SOLUTION INTRAVENOUS ONCE
Status: COMPLETED | OUTPATIENT
Start: 2022-08-26 | End: 2022-08-27

## 2022-08-26 RX ORDER — HYDRALAZINE HYDROCHLORIDE 20 MG/ML
10 INJECTION INTRAMUSCULAR; INTRAVENOUS EVERY 6 HOURS PRN
Status: DISCONTINUED | OUTPATIENT
Start: 2022-08-26 | End: 2022-08-31 | Stop reason: HOSPADM

## 2022-08-26 RX ORDER — SODIUM CHLORIDE 0.9 % (FLUSH) 0.9 %
5-40 SYRINGE (ML) INJECTION PRN
Status: DISCONTINUED | OUTPATIENT
Start: 2022-08-26 | End: 2022-08-31 | Stop reason: HOSPADM

## 2022-08-26 RX ORDER — ACETAMINOPHEN 325 MG/1
650 TABLET ORAL EVERY 6 HOURS PRN
Status: DISCONTINUED | OUTPATIENT
Start: 2022-08-26 | End: 2022-08-31 | Stop reason: HOSPADM

## 2022-08-26 RX ORDER — GABAPENTIN 400 MG/1
400 CAPSULE ORAL EVERY EVENING
Status: CANCELLED | OUTPATIENT
Start: 2022-08-26

## 2022-08-26 RX ADMIN — CEFTRIAXONE 1000 MG: 1 INJECTION, POWDER, FOR SOLUTION INTRAMUSCULAR; INTRAVENOUS at 22:45

## 2022-08-26 RX ADMIN — SODIUM CHLORIDE, POTASSIUM CHLORIDE, SODIUM LACTATE AND CALCIUM CHLORIDE 500 ML: 600; 310; 30; 20 INJECTION, SOLUTION INTRAVENOUS at 22:46

## 2022-08-26 RX ADMIN — AZITHROMYCIN DIHYDRATE 500 MG: 500 INJECTION, POWDER, LYOPHILIZED, FOR SOLUTION INTRAVENOUS at 19:46

## 2022-08-26 RX ADMIN — SODIUM CHLORIDE 250 ML: 9 INJECTION, SOLUTION INTRAVENOUS at 18:33

## 2022-08-26 RX ADMIN — VANCOMYCIN HYDROCHLORIDE 750 MG: 10 INJECTION, POWDER, LYOPHILIZED, FOR SOLUTION INTRAVENOUS at 21:07

## 2022-08-26 ASSESSMENT — PAIN - FUNCTIONAL ASSESSMENT
PAIN_FUNCTIONAL_ASSESSMENT: NONE - DENIES PAIN

## 2022-08-26 ASSESSMENT — ENCOUNTER SYMPTOMS
SHORTNESS OF BREATH: 0
WHEEZING: 0
VOMITING: 0
COLOR CHANGE: 0
RHINORRHEA: 0
BLOOD IN STOOL: 0
NAUSEA: 0
SORE THROAT: 0
ABDOMINAL PAIN: 0

## 2022-08-26 NOTE — ED PROVIDER NOTES
Surgical, Family, and Social History     She has a past medical history of Age-related macular degeneration, dry, Anemia B12 deficiency, Balance disorder, Cancer, skin, squamous cell, Chronic constipation, Colorectal polyps, Depression, Diverticulosis, Epistaxis, Hyperlipidemia, Hypertension, Low magnesium levels, Neuropathy, Osteoarthritis, Peripheral edema, Pruritis, Recurrent UTI, Small vessel disease, cerebrovascular, Tuberculosis, Type II or unspecified type diabetes mellitus without mention of complication, not stated as uncontrolled, Unspecified sleep apnea, and Urgency incontinence. She has a past surgical history that includes Cystocele repair; Rectocele repair; Total knee arthroplasty; and Hysterectomy. Her family history is not on file. She reports that she has never smoked. She has never used smokeless tobacco. She reports current alcohol use. She reports that she does not use drugs. Medications     Previous Medications    ASPIRIN EC 81 MG EC TABLET    Take 1 tablet by mouth in the morning. BIOTIN 10 MG CAPS    Take by mouth    CYANOCOBALAMIN ([employer] VITAMIN B12) 1000 MCG TABLET    Place 5,000 mcg under the tongue    GABAPENTIN (NEURONTIN) 400 MG CAPSULE    Take 1 capsule by mouth every evening    GLUCOSE BLOOD VI TEST STRIPS (ASCENSIA AUTODISC VI;ONE TOUCH ULTRA TEST VI) STRIP    1 strip    GUAIFENESIN (MUCINEX) 600 MG EXTENDED RELEASE TABLET    Take 1 tablet by mouth 2 times daily as needed for Congestion    MAGNESIUM PO    Take 1 tablet by mouth daily     METFORMIN (GLUCOPHAGE) 1000 MG TABLET    Take 1,000 mg by mouth    OMEGA-3 FATTY ACIDS (FISH OIL OMEGA-3 PO)    Take 1 tablet by mouth daily     ROSUVASTATIN (CRESTOR) 10 MG TABLET    Take 10 mg by mouth       Allergies     She is allergic to lipitor, cymbalta [duloxetine hcl], and pregabalin.     Physical Exam     INITIAL VITALS: BP: (!) 135/58, Temp: 97.5 °F (36.4 °C), Heart Rate: 74, Resp: 16, SpO2: 99 %   Physical Exam  Constitutional: positioning. There is severe medial compartment osteoarthritis. There is lateral compartment chondrocalcinosis. Evaluation for knee effusion is technically limited. Left tibia/fibula: 2 views demonstrate a 5 mm well-circumscribed lytic lesion in the distal tibial diaphysis, of unclear etiology. In the absence of known primary neoplasm this is likely of no clinical significance. Otherwise no acute abnormality seen. Knee arthroplasty is standardly position. XR KNEE RIGHT (3 VIEWS)   Final Result      Right knee: 2 views obtained. Lateral view is limited by oblique positioning. There is severe medial compartment osteoarthritis. There is lateral compartment chondrocalcinosis. Evaluation for knee effusion is technically limited. Left tibia/fibula: 2 views demonstrate a 5 mm well-circumscribed lytic lesion in the distal tibial diaphysis, of unclear etiology. In the absence of known primary neoplasm this is likely of no clinical significance. Otherwise no acute abnormality seen. Knee arthroplasty is standardly position.           LABS:   Results for orders placed or performed during the hospital encounter of 08/26/22   BMP w/ Reflex to MG   Result Value Ref Range    Sodium 133 (L) 136 - 145 mmol/L    Potassium reflex Magnesium 4.1 3.5 - 5.1 mmol/L    Chloride 97 (L) 99 - 110 mmol/L    CO2 24 21 - 32 mmol/L    Anion Gap 12 3 - 16    Glucose 239 (H) 70 - 99 mg/dL    BUN 8 7 - 20 mg/dL    Creatinine <0.5 (L) 0.6 - 1.2 mg/dL    GFR Non-African American >60 >60    GFR African American >60 >60    Calcium 9.1 8.3 - 10.6 mg/dL   CBC with Auto Differential   Result Value Ref Range    WBC 11.2 (H) 4.0 - 11.0 K/uL    RBC 3.78 (L) 4.00 - 5.20 M/uL    Hemoglobin 11.9 (L) 12.0 - 16.0 g/dL    Hematocrit 35.1 (L) 36.0 - 48.0 %    MCV 93.0 80.0 - 100.0 fL    MCH 31.4 26.0 - 34.0 pg    MCHC 33.8 31.0 - 36.0 g/dL    RDW 12.9 12.4 - 15.4 %    Platelets 077 666 - 445 K/uL    MPV 7.0 5.0 - 10.5 fL    Neutrophils % 87.4 % Lymphocytes % 6.8 %    Monocytes % 5.2 %    Eosinophils % 0.3 %    Basophils % 0.3 %    Neutrophils Absolute 9.8 (H) 1.7 - 7.7 K/uL    Lymphocytes Absolute 0.8 (L) 1.0 - 5.1 K/uL    Monocytes Absolute 0.6 0.0 - 1.3 K/uL    Eosinophils Absolute 0.0 0.0 - 0.6 K/uL    Basophils Absolute 0.0 0.0 - 0.2 K/uL   Lactic Acid   Result Value Ref Range    Lactic Acid 2.8 (H) 0.4 - 2.0 mmol/L   C-Reactive Protein   Result Value Ref Range    CRP <3.0 0.0 - 5.1 mg/L   Troponin   Result Value Ref Range    Troponin <0.01 <0.01 ng/mL   Lactic Acid   Result Value Ref Range    Lactic Acid 3.9 (H) 0.4 - 2.0 mmol/L   Brain Natriuretic Peptide   Result Value Ref Range    Pro- 0 - 449 pg/mL       ED BEDSIDE ULTRASOUND:  No results found. RECENT VITALS:  BP: 137/62, Temp: 97.5 °F (36.4 °C), Heart Rate: 74,Resp: 18, SpO2: 98 %     Procedures           MEDICAL DECISION MAKING / ED Course / ASSESSMENT / PLAN   Nursing Notes, Past Medical Hx, Past Surgical Hx, Social Hx, Allergies, and Family Hx were reviewed. This patient was also evaluated by the attending physician. All care plans were discussed and agreed upon. The patient is uncertain why she fell. Because she is anticoagulated decision was made to obtain a CT Noncon. There is no signs of acute abnormality including hemorrhage. Her left lower extremity appears to be infected. There is no crepitus or streaking to suggest an NSTI. His CRP was also not elevated. It is most likely cellulitis. Chest x-ray also revealed evidence of atypical pneumonia. Her troponin was less than 0.01 and her EKG did not show any signs of ischemic changes or dysrhythmia. She did have an elevated white count 11.2. Her lactic was elevated at 2.8. She was started on 750 mL of fluid and azithromycin, ceftriaxone, and vancomycin was initiated.   The case was discussed with the inpatient hospitalist and she will be admitted for further evaluation and management of her cellulitis and atypical pneumonia. On the emergency department the patient was afebrile and hemodynamically stable. The patient was given the followingmedications:  Orders Placed This Encounter   Medications    0.9 % sodium chloride bolus    vancomycin (VANCOCIN) 750 mg in dextrose 5 % 250 mL IVPB     Order Specific Question:   Antimicrobial Indications     Answer:   Skin and Soft Tissue Infection    cefTRIAXone (ROCEPHIN) 1,000 mg in dextrose 5 % 50 mL IVPB mini-bag     Order Specific Question:   Antimicrobial Indications     Answer:   Skin and Soft Tissue Infection    azithromycin (ZITHROMAX) 500 mg in dextrose 5 % 250 mL IVPB     Order Specific Question:   Antimicrobial Indications     Answer:   Pneumonia (CAP)    lactated ringers bolus         CONSULTS:  IP CONSULT TO HOSPITALIST    Clinical Impression     1. Cellulitis of left lower extremity    2. Fall, initial encounter    3. Atypical pneumonia        Disposition     PATIENT REFERRED TO:  No follow-up provider specified.     DISCHARGE MEDICATIONS:  New Prescriptions    No medications on file       DISPOSITION Decision To Admit 08/26/2022 07:32:31 PM       David Gallagher MD  08/26/22 [id number]

## 2022-08-27 ENCOUNTER — APPOINTMENT (OUTPATIENT)
Dept: CT IMAGING | Age: 87
DRG: 689 | End: 2022-08-27
Payer: MEDICARE

## 2022-08-27 LAB
ANION GAP SERPL CALCULATED.3IONS-SCNC: 8 MMOL/L (ref 3–16)
BASOPHILS ABSOLUTE: 0.1 K/UL (ref 0–0.2)
BASOPHILS RELATIVE PERCENT: 0.5 %
BUN BLDV-MCNC: 7 MG/DL (ref 7–20)
CALCIUM SERPL-MCNC: 8.9 MG/DL (ref 8.3–10.6)
CHLORIDE BLD-SCNC: 99 MMOL/L (ref 99–110)
CO2: 26 MMOL/L (ref 21–32)
CREAT SERPL-MCNC: <0.5 MG/DL (ref 0.6–1.2)
EOSINOPHILS ABSOLUTE: 0 K/UL (ref 0–0.6)
EOSINOPHILS RELATIVE PERCENT: 0.2 %
GFR AFRICAN AMERICAN: >60
GFR NON-AFRICAN AMERICAN: >60
GLUCOSE BLD-MCNC: 211 MG/DL (ref 70–99)
GLUCOSE BLD-MCNC: 282 MG/DL (ref 70–99)
GLUCOSE BLD-MCNC: 332 MG/DL (ref 70–99)
GLUCOSE BLD-MCNC: 341 MG/DL (ref 70–99)
HCT VFR BLD CALC: 31.1 % (ref 36–48)
HEMOGLOBIN: 11.1 G/DL (ref 12–16)
LACTIC ACID: 2.2 MMOL/L (ref 0.4–2)
LYMPHOCYTES ABSOLUTE: 1 K/UL (ref 1–5.1)
LYMPHOCYTES RELATIVE PERCENT: 9.9 %
MCH RBC QN AUTO: 32.3 PG (ref 26–34)
MCHC RBC AUTO-ENTMCNC: 35.6 G/DL (ref 31–36)
MCV RBC AUTO: 90.7 FL (ref 80–100)
MONOCYTES ABSOLUTE: 0.5 K/UL (ref 0–1.3)
MONOCYTES RELATIVE PERCENT: 4.7 %
NEUTROPHILS ABSOLUTE: 8.1 K/UL (ref 1.7–7.7)
NEUTROPHILS RELATIVE PERCENT: 84.7 %
PDW BLD-RTO: 12.9 % (ref 12.4–15.4)
PERFORMED ON: ABNORMAL
PLATELET # BLD: 235 K/UL (ref 135–450)
PMV BLD AUTO: 7.1 FL (ref 5–10.5)
POTASSIUM REFLEX MAGNESIUM: 4.4 MMOL/L (ref 3.5–5.1)
PROCALCITONIN: 0.12 NG/ML (ref 0–0.15)
RBC # BLD: 3.43 M/UL (ref 4–5.2)
SEDIMENTATION RATE, ERYTHROCYTE: 9 MM/HR (ref 0–30)
SODIUM BLD-SCNC: 133 MMOL/L (ref 136–145)
WBC # BLD: 9.6 K/UL (ref 4–11)

## 2022-08-27 PROCEDURE — 85025 COMPLETE CBC W/AUTO DIFF WBC: CPT

## 2022-08-27 PROCEDURE — 73700 CT LOWER EXTREMITY W/O DYE: CPT

## 2022-08-27 PROCEDURE — 80048 BASIC METABOLIC PNL TOTAL CA: CPT

## 2022-08-27 PROCEDURE — 84145 PROCALCITONIN (PCT): CPT

## 2022-08-27 PROCEDURE — 36415 COLL VENOUS BLD VENIPUNCTURE: CPT

## 2022-08-27 PROCEDURE — 83605 ASSAY OF LACTIC ACID: CPT

## 2022-08-27 PROCEDURE — 6370000000 HC RX 637 (ALT 250 FOR IP): Performed by: INTERNAL MEDICINE

## 2022-08-27 PROCEDURE — 1200000000 HC SEMI PRIVATE

## 2022-08-27 PROCEDURE — 85652 RBC SED RATE AUTOMATED: CPT

## 2022-08-27 PROCEDURE — 83036 HEMOGLOBIN GLYCOSYLATED A1C: CPT

## 2022-08-27 PROCEDURE — 6360000002 HC RX W HCPCS: Performed by: INTERNAL MEDICINE

## 2022-08-27 PROCEDURE — 2580000003 HC RX 258: Performed by: INTERNAL MEDICINE

## 2022-08-27 RX ORDER — TRAMADOL HYDROCHLORIDE 50 MG/1
50 TABLET ORAL EVERY 12 HOURS PRN
Status: DISCONTINUED | OUTPATIENT
Start: 2022-08-27 | End: 2022-08-31 | Stop reason: HOSPADM

## 2022-08-27 RX ORDER — TRAMADOL HYDROCHLORIDE 50 MG/1
50 TABLET ORAL 2 TIMES DAILY
Status: ON HOLD | COMMUNITY
End: 2022-08-31 | Stop reason: SDUPTHER

## 2022-08-27 RX ORDER — GABAPENTIN 400 MG/1
400 CAPSULE ORAL 2 TIMES DAILY
Status: DISCONTINUED | OUTPATIENT
Start: 2022-08-27 | End: 2022-08-31 | Stop reason: HOSPADM

## 2022-08-27 RX ORDER — TRAMADOL HYDROCHLORIDE 50 MG/1
50 TABLET ORAL EVERY 8 HOURS PRN
Status: DISCONTINUED | OUTPATIENT
Start: 2022-08-27 | End: 2022-08-31 | Stop reason: HOSPADM

## 2022-08-27 RX ORDER — LACTOBACILLUS RHAMNOSUS GG 10B CELL
1 CAPSULE ORAL DAILY
Status: DISCONTINUED | OUTPATIENT
Start: 2022-08-27 | End: 2022-08-31 | Stop reason: HOSPADM

## 2022-08-27 RX ORDER — INSULIN LISPRO 100 [IU]/ML
0-4 INJECTION, SOLUTION INTRAVENOUS; SUBCUTANEOUS NIGHTLY
Status: DISCONTINUED | OUTPATIENT
Start: 2022-08-27 | End: 2022-08-28

## 2022-08-27 RX ORDER — GABAPENTIN 400 MG/1
400 CAPSULE ORAL 2 TIMES DAILY
COMMUNITY

## 2022-08-27 RX ORDER — INSULIN LISPRO 100 [IU]/ML
0-4 INJECTION, SOLUTION INTRAVENOUS; SUBCUTANEOUS
Status: DISCONTINUED | OUTPATIENT
Start: 2022-08-27 | End: 2022-08-28

## 2022-08-27 RX ORDER — DEXTROSE MONOHYDRATE 100 MG/ML
INJECTION, SOLUTION INTRAVENOUS CONTINUOUS PRN
Status: DISCONTINUED | OUTPATIENT
Start: 2022-08-27 | End: 2022-08-28 | Stop reason: SDUPTHER

## 2022-08-27 RX ADMIN — SODIUM CHLORIDE, PRESERVATIVE FREE 10 ML: 5 INJECTION INTRAVENOUS at 08:47

## 2022-08-27 RX ADMIN — INSULIN LISPRO 4 UNITS: 100 INJECTION, SOLUTION INTRAVENOUS; SUBCUTANEOUS at 21:19

## 2022-08-27 RX ADMIN — VANCOMYCIN HYDROCHLORIDE 1250 MG: 10 INJECTION, POWDER, LYOPHILIZED, FOR SOLUTION INTRAVENOUS at 12:58

## 2022-08-27 RX ADMIN — AZITHROMYCIN DIHYDRATE 500 MG: 500 INJECTION, POWDER, LYOPHILIZED, FOR SOLUTION INTRAVENOUS at 19:53

## 2022-08-27 RX ADMIN — TRAMADOL HYDROCHLORIDE 50 MG: 50 TABLET, COATED ORAL at 17:21

## 2022-08-27 RX ADMIN — Medication 1 CAPSULE: at 12:03

## 2022-08-27 RX ADMIN — APIXABAN 5 MG: 5 TABLET, FILM COATED ORAL at 08:46

## 2022-08-27 RX ADMIN — SODIUM CHLORIDE, PRESERVATIVE FREE 10 ML: 5 INJECTION INTRAVENOUS at 21:23

## 2022-08-27 RX ADMIN — ONDANSETRON 4 MG: 2 INJECTION INTRAMUSCULAR; INTRAVENOUS at 18:38

## 2022-08-27 RX ADMIN — INSULIN LISPRO 2 UNITS: 100 INJECTION, SOLUTION INTRAVENOUS; SUBCUTANEOUS at 18:05

## 2022-08-27 RX ADMIN — APIXABAN 5 MG: 5 TABLET, FILM COATED ORAL at 21:18

## 2022-08-27 RX ADMIN — CEFTRIAXONE 1000 MG: 1 INJECTION, POWDER, FOR SOLUTION INTRAMUSCULAR; INTRAVENOUS at 22:50

## 2022-08-27 RX ADMIN — GABAPENTIN 400 MG: 400 CAPSULE ORAL at 21:18

## 2022-08-27 ASSESSMENT — PAIN DESCRIPTION - LOCATION: LOCATION: LEG

## 2022-08-27 ASSESSMENT — PAIN DESCRIPTION - ORIENTATION: ORIENTATION: RIGHT;LEFT

## 2022-08-27 ASSESSMENT — PAIN SCALES - WONG BAKER: WONGBAKER_NUMERICALRESPONSE: 4

## 2022-08-27 NOTE — H&P
History and Physical    Admit Date: 8/26/2022    Patient's PCP: Dr. Mauricio Shelby MD      Chief complaint: Fall      HISTORY OF PRESENT ILLNESS:    This is a very pleasant 80 y.o. 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 She was walking to the restroom with her walker, and lost balance and fell. She had been feeling very weak. She denies hitting her head or trauma to any body parts. She denies loss of consciousness. She denies focal weakness prior to or since fall. She has not had chest pain, dizziness or lightheadedness    She was admitted   7/14/2022 -7/20/2022 with fall and large scalp laceration requiring staples with urinary tract infection, and was treated with ceftriaxone,  discharged on cefdinir. She was at rehab where she was not moving around much as per son, and she was found with a DVT 10 days PTA, and placed on eliquis. She was discharged from [address] [place] [address] to home yesterday. In the ED, she was afebrile, and had fairly normal vitals. Lab work showed WBC of 11.2 with neutrophilia of 9.8, hemoglobin 11.9. Chemistries were fairly normal, lactic acid was marginally elevated at 2.8. Blood glucose 239.  29 less than 0.01    EKG showed no acute ST or T wave change    Head CT showed no acute process. Chest x-ray showed \". ..hazy bibasilar consolidations worse on the right. \"    She was given  750 mL of fluid and azithromycin, ceftriaxone, and vancomycin. Past Medical / Surgical History:    Past Medical History:   Diagnosis Date    Age-related macular degeneration, dry     Anemia B12 deficiency     Balance disorder     Cancer, skin, squamous cell 12/10    right lower leg x 2 - having these surgically removed.     Chronic constipation     Colorectal polyps     Depression     Diverticulosis     Epistaxis     recurrent     Hyperlipidemia Hypertension     Low magnesium levels     Neuropathy     peripheral - Dr. Namrata Antunez     Peripheral edema     Pruritis     Dr. Dorcas Castillo    Recurrent UTI     Small vessel disease, cerebrovascular 3/2013    MRI    Tuberculosis     as a child    Type II or unspecified type diabetes mellitus without mention of complication, not stated as uncontrolled     Unspecified sleep apnea     CPAP - stopped using this in 2012    Urgency incontinence        Past Surgical History:   Procedure Laterality Date    CYSTOCELE REPAIR      HYSTERECTOMY (CERVIX STATUS UNKNOWN)      ovaries intact    RECTOCELE REPAIR      TOTAL KNEE ARTHROPLASTY         Medications Prior to Admission:    No current facility-administered medications on file prior to encounter. Current Outpatient Medications on File Prior to Encounter   Medication Sig Dispense Refill    aspirin EC 81 MG EC tablet Take 1 tablet by mouth in the morning. 90 tablet 1    gabapentin (NEURONTIN) 400 MG capsule Take 1 capsule by mouth every evening 90 capsule 3    guaiFENesin (MUCINEX) 600 MG extended release tablet Take 1 tablet by mouth 2 times daily as needed for Congestion 60 tablet 0    Biotin 10 MG CAPS Take by mouth      cyanocobalamin ([employer] VITAMIN B12) 1000 MCG tablet Place 5,000 mcg under the tongue      Omega-3 Fatty Acids (FISH OIL OMEGA-3 PO) Take 1 tablet by mouth daily       glucose blood VI test strips (ASCENSIA AUTODISC VI;ONE TOUCH ULTRA TEST VI) strip 1 strip      metFORMIN (GLUCOPHAGE) 1000 MG tablet Take 1,000 mg by mouth      rosuvastatin (CRESTOR) 10 MG tablet Take 10 mg by mouth      MAGNESIUM PO Take 1 tablet by mouth daily          Allergies:  Lipitor, Cymbalta [duloxetine hcl], and Pregabalin    Social History:   TOBACCO:   reports that she has never smoked. She has never used smokeless tobacco.     ETOH:   reports current alcohol use. Family History:   History reviewed. No pertinent family history.     ROS: Review of Systems - Negative except as in HPI. All other systems reviewed and are negative. PHYSICAL EXAM:  /62   Pulse 74   Temp 97.5 °F (36.4 °C) (Oral)   Resp 18   Wt 126 lb 6.4 oz (57.3 kg)   SpO2 98%   BMI 21.70 kg/m²     No results for input(s): POCGLU in the last 72 hours. General appearance: alert, appears stated age, and cooperative  Head: Normocephalic, without obvious abnormality, atraumatic  Neck: no adenopathy, no carotid bruit, no JVD, supple, symmetrical, trachea midline, and thyroid not enlarged, symmetric, no tenderness/mass/nodules  Lungs: clear to auscultation bilaterally  Heart: regular rate and rhythm, S1, S2 normal, no murmur, click, rub or gallop  Abdomen: soft, non-tender; bowel sounds normal; no masses,  no organomegaly  Extremities: venous stasis changes in the left lower extremity. This leg is also warm to the touch and markedly erythematous. It appears to be cellulitic. The popliteal region of her left leg is erythematous and weeping serosanguineous fluid. There is also an approximately 10 cm rectangular laceration over the anterior surface of her right lower extremity. Pulses: 2+ and symmetric      LABS:  Recent Labs     08/26/22  1623   WBC 11.2*   HGB 11.9*   HCT 35.1*                                                                     Recent Labs     08/26/22  1623   *   K 4.1   CL 97*   CO2 24   BUN 8   CREATININE <0.5*   GLUCOSE 239*     No results for input(s): AST, ALT, ALB, BILITOT, ALKPHOS in the last 72 hours. Recent Labs     08/26/22  1748   TROPONINI <0.01     No results for input(s): BNP in the last 72 hours. No results found for: PHART, XLP6QWE, PO2ART  No results for input(s): INR in the last 72 hours. No results for input(s): NITRITE, COLORU, PHUR, LABCAST, WBCUA, RBCUA, MUCUS, TRICHOMONAS, YEAST, BACTERIA, CLARITYU, SPECGRAV, LEUKOCYTESUR, UROBILINOGEN, BILIRUBINUR, BLOODU, GLUCOSEU, AMORPHOUS in the last 72 hours.     Invalid input(s): Ingrid Gonzalez on metformin. With her hx of heart failure, will not give her any additional fluids, except she develops SIRS features. Podiatry consult for extensive RLE edema with skin breakdown. Monitor vitals and labs. PT OT eval      The patient and / or the family were informed of the results of any tests, a time was given to answer questions, a plan was proposed and they agreed with plan. Thank you Dr. Laurence Carson MD for the opportunity to be involved in this patients care. If you have any questions or concerns please feel free to contact me at [phone].   Prior    Sean Christiansen MD

## 2022-08-27 NOTE — PROGRESS NOTES
Progress Note    Admit Date: 8/26/2022    Patient's PCP: Dr. Larry Ryder MD      Chief complaint: Fall      HISTORY OF PRESENT ILLNESS:    This is a very pleasant 80 y.o. 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 She was walking to the restroom with her walker, and lost balance and fell. She had been feeling very weak. She denies hitting her head or trauma to any body parts. She denies loss of consciousness. She denies focal weakness prior to or since fall. She has not had chest pain, dizziness or lightheadedness    She was admitted   7/14/2022 -7/20/2022 with fall and large scalp laceration requiring staples with urinary tract infection, and was treated with ceftriaxone,  discharged on cefdinir. She was at rehab where she was not moving around much as per son, and she was found with a DVT 10 days PTA, and placed on eliquis. She was discharged from [address] [place] [address] to home yesterday. In the ED, she was afebrile, and had fairly normal vitals. Lab work showed WBC of 11.2 with neutrophilia of 9.8, hemoglobin 11.9. Chemistries were fairly normal, lactic acid was marginally elevated at 2.8. Blood glucose 239.  29 less than 0.01    EKG showed no acute ST or T wave change    Head CT showed no acute process. Chest x-ray showed \". ..hazy bibasilar consolidations worse on the right. \"    She was given  750 mL of fluid and azithromycin, ceftriaxone, and vancomycin. Interval HPI  No new complaints  No chest pain  No fever    No dyspnea        Medications: Reviewed      Allergies:  Lipitor, Cymbalta [duloxetine hcl], and Pregabalin        ROS: Review of Systems - Negative except as in HPI. All other systems reviewed and are negative.     PHYSICAL EXAM:  /65   Pulse 83   Temp 98.2 °F (36.8 °C) (Oral)   Resp 16   Ht 5' 5\" (1.651 m)   Wt 127 lb 9 oz (57.9 kg)   SpO2 94%   BMI 21.23 kg/m²     No results for input(s): POCGLU in the last 72 hours. General appearance: alert, appears stated age, and cooperative  Head: Normocephalic, without obvious abnormality, atraumatic  Neck: no adenopathy, no carotid bruit, no JVD, supple, symmetrical, trachea midline, and thyroid not enlarged, symmetric, no tenderness/mass/nodules  Lungs: clear to auscultation bilaterally  Heart: regular rate and rhythm, S1, S2 normal, no murmur, click, rub or gallop  Abdomen: soft, non-tender; bowel sounds normal; no masses,  no organomegaly  Extremities: venous stasis changes in the left lower extremity. This leg is also warm to the touch and markedly erythematous. It appears to be cellulitic. The popliteal region of her left leg is erythematous and weeping serosanguineous fluid. There is also an approximately 10 cm rectangular laceration over the anterior surface of her right lower extremity. Pulses: 2+ and symmetric      LABS:  Recent Labs     08/26/22  1623 08/27/22  0602   WBC 11.2* 9.6   HGB 11.9* 11.1*   HCT 35.1* 31.1*    235                                                                    Recent Labs     08/26/22  1623 08/27/22  0602   * 133*   K 4.1 4.4   CL 97* 99   CO2 24 26   BUN 8 7   CREATININE <0.5* <0.5*   GLUCOSE 239* 211*       No results for input(s): AST, ALT, ALB, BILITOT, ALKPHOS in the last 72 hours. Recent Labs     08/26/22  1748   TROPONINI <0.01       No results for input(s): BNP in the last 72 hours. No results found for: PHART, MEF1OMW, PO2ART  No results for input(s): INR in the last 72 hours.   Recent Labs     08/26/22  2318   COLORU Yellow   PHUR 8.0   WBCUA 6-9*   RBCUA None seen   BACTERIA 2+*   CLARITYU Clear   SPECGRAV 1.015   LEUKOCYTESUR MODERATE*   UROBILINOGEN 0.2   BILIRUBINUR Negative   BLOODU Negative   GLUCOSEU 500*              Echocardiogram July 2022   Summary   Left ventricular cavity size is normal. There is mildly increased left   ventricular wall thickness. Ejection fraction is visually estimated to be   55-60%. No regional wall motion abnormalities are noted. Grade II diastolic   dysfunction with elevated LV filling pressures. Mitral annular calcification is present. Thickened mitral valve leaflets. Moderate to severe mitral regurgitation. The left atrium is moderately dilated. Normal right ventricular size and function. Assessment & Plan:    80 y.o. 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 Left LE cellulitis: POA  Laceration right lower extremity: POA  Generalized weakness  Recurrent falls  Possible complicated UTI  Abnormal chest x-ray  Lactic acidosis  Diabetes mellitus type 2; complicated by peripheral neuropathy  Recent DVT on Eliquis  Chronic diastolic heart failure      Patient presented with generalized weakness and fall    Was admitted last month with fall resulting in head laceration requiring staples    Urinalysis done and was positive, urine culture in process    Will obtain urinalysis: To be interpreted with knowledge patient has already received antibiotic    She does admit to urinary frequency and dysuria. Bladder scans    Was felt to have left lower extremity cellulitis in the emergency, and possible atypical pneumonia    Was treated with intravenous vancomycin as well as ceftriaxone and azithromycin    Continue antibiotics started in the ED pending further cx data. Likely de-escalate in 1-2 days    Checked PCT: 0.12. Doubt Pneumonia.      Wound cx    Appreciate Podiatry input:  local wound care     Follow-up left lower extremity CT    Recent echocardiogram reviewed    She does not appear to have respiratory symptoms, but will  continue antibiotics with intravenous ceftriaxone and azithromycin for now, will treat cover potential urinary tract infection    Will update vitamin B12 level, and TSH as well as hemoglobin A1c    She has lactic acidosis, but does not seem to have features of sepsis at this time. She is on metformin. With her hx of heart failure, will not give her any additional fluids, except she develops SIRS features. Podiatry consult for extensive RLE edema with skin breakdown. Monitor vitals and labs. PT OT eval      The patient and / or the family were informed of the results of any tests, a time was given to answer questions, a plan was proposed and they agreed with plan.     Full Code      Disposition: Pending Progress    PT/OT eval    Will likely need placement at eventual discharge    Radha Black MD

## 2022-08-27 NOTE — CONSULTS
Clinical Pharmacy Progress Note  Medication History      Asked to verify home medications by Dr. Alana Hobson. List of of current medications patient is taking is complete. Home Medication list in EPIC updated to reflect changes noted below.      Source of information: interview with patient and patient family in room     Changes made to home medication list:   Medications removed (no longer taking):  Aspirin - states PCP stopped this several months ago  Guaifenesin  Fish Oil  Rosuvastatin - states this was stopped at [facility] while there for rehab this month     Medications added:   Apixaban  Tramadol     Medication doses / instructions adjusted:   Gabapentin - takes 400mg BID (not nightly)     Please call with questions--  Thanks--  Terence Navarro, PharmD, BCPS, [facility]  [id number] ([facility])   8/27/2022 1:16 PM      Current Outpatient Medications   Medication Instructions    apixaban (ELIQUIS) 5 mg, Oral, 2 TIMES DAILY    Biotin 10 mg, Oral, DAILY    Cyanocobalamin 5,000 mcg, SubLINGual, DAILY    gabapentin (NEURONTIN) 400 mg, Oral, 2 times daily    glucose blood VI test strips (ASCENSIA AUTODISC VI;ONE TOUCH ULTRA TEST VI) strip 1 strip    MAGNESIUM PO 1 tablet, Oral, DAILY    metFORMIN (GLUCOPHAGE) 1,000 mg, Oral, 2 TIMES DAILY WITH MEALS    traMADol (ULTRAM) 50 mg, Oral, 2 times daily

## 2022-08-27 NOTE — PROGRESS NOTES
Patient alert and oriented x 3. She is hard of hearing, uses hearing aides at home but does not have them presently in room. Bilateral legs wrapped by podiatry. Complained of pain in lower back and bilateral legs. Repositioned and PRN meds given as ordered. Bed in lowest position; bed alarm on and call light within reach. No new complaints or issues at this time.

## 2022-08-27 NOTE — CONSULTS
PRN  azithromycin (ZITHROMAX) 500 mg in dextrose 5 % 250 mL IVPB, Q24H  melatonin tablet 3 mg, Nightly PRN  hydrALAZINE (APRESOLINE) injection 10 mg, Q6H PRN  cefTRIAXone (ROCEPHIN) 1,000 mg in dextrose 5 % 50 mL IVPB mini-bag, Q24H  apixaban (ELIQUIS) tablet 5 mg, BID        Family History:   History reviewed. No pertinent family history. Social History:   TOBACCO:   reports that she has never smoked. She has never used smokeless tobacco.  ETOH:   reports current alcohol use. DRUGS:   reports no history of drug use. REVIEW OF SYSTEMS:    As above. PHYSICAL EXAM:      Vitals:    /64   Pulse 86   Temp 98.8 °F (37.1 °C) (Oral)   Resp 16   Ht 5' 5\" (1.651 m)   Wt 127 lb 9 oz (57.9 kg)   SpO2 95%   BMI 21.23 kg/m²     LABS:   Recent Labs     08/26/22  1623 08/27/22  0602   WBC 11.2* 9.6   HGB 11.9* 11.1*   HCT 35.1* 31.1*    235     Recent Labs     08/26/22  1730 08/27/22  0602   NA  --  133*   K  --  4.4   CL  --  99   CO2  --  26   PHOS 3.2  --    BUN  --  7   CREATININE  --  <0.5*     No results for input(s): PROT, INR, APTT in the last 72 hours. VASCULAR: DP and PT pulses non-palpable. Upon handheld doppler examination the DP/PT were biphasic b/l. CFT is brisk to the digits of the foot b/l. Skin temperature is warm to cool from proximal to distal with no focal calor noted on the right lower extremity and warm to warm on left lower extremity with generalized calor to the entire leg. No edema noted to the right lower extremity with  tense non-pitting edema noted to the left lower extremity. No pain with calf compression b/l. NEUROLOGIC: Gross and epicritic sensation is diminished b/l. Protective sensation is diminished at all pedal sites b/l. DERMATOLOGIC: Nails 1-5 b/l are within normal limits of length, thickness, and color. Webspaces 1-4 b/l are clean, dry, and intact. No hyperkeratosis noted. No subcutaneous nodules, rashes, or other skin lesions noted.     Right Lower Extremity:    Evidence of a skin tear to the lateral proximal aspect of the right leg approximately 4 cm x  5 cm with significant serious crust. No fluctuance, crepitus, purulence or hematoma expressed. The lesion does not tunnel or track with no probe to bone present. Left Lower extremity:     The entire soft tissue envelope of the left lower extremity is tense with diffuse areas of fluctuance and ecchymosis present. There is evidence of serous drainage present but no signs of open lesions or bullae present. MUSCULOSKELETAL: Muscle strength is 5/5 for all pedal groups tested. No pain with palpation of the foot or ankle b/l. Ankle joint ROM is decreased in dorsiflexion with the knee extended. No obvious biomechanical abnormalities. Imaging:      Xray tibfib left 8/26/22  Right knee, left tibia/fibula       HISTORY: Pain           Impression       Right knee: 2 views obtained. Lateral view is limited by oblique positioning. There is severe medial compartment osteoarthritis. There is lateral compartment chondrocalcinosis. Evaluation for knee effusion is technically limited. Left tibia/fibula: 2 views demonstrate a 5 mm well-circumscribed lytic lesion in the distal tibial diaphysis, of unclear etiology. In the absence of known primary neoplasm this is likely of no clinical significance. Otherwise no acute abnormality seen. Knee arthroplasty is standardly position. IMPRESSION/RECOMMENDATIONS:      - Cellulitis left lower extremity  - Edema left lower extremity  - Laceration right lower extremity  - Recent history of DVT left lower extremity  - Diabetes type II  - History of hypertension  - history of Hyperlipidemia     -Patient examined and evaluated at the bedside   -VSS. No Leukocytosis noted. -ESR 9 CRP 11.1  -X-rays reviewed and impressions noted above  - CT ordered of left tib/fib and left ankle for further evaluation due to persistent edema.   We will follow-up results.  -Steri-Strips is applied by the ED were left in place, dressing was applied to the right lower extremity consisting of Adaptic, gauze, Kerlix, Ace.  -Dressing applied to the left lower extremity consisting of gauze, Kerlix, Ace.  -Dressing to remain clean dry and intact  -Elevate lower extremity b/l  -Continue with Eliquis at this time due to recent history of DVT left lower extremity.  -Antibiotics: azithromycin, ceftriaxone   -Weightbearing as tolerated and up with assistance; patient a fall risk. - Fall precautions    DISPO: Laceration right lower extremity, cellulitis left lower extremity with history of DVT. Continue antibiotics per primary team.  Labs and imaging reviewed. Follow-up left lower extremity CT. Patient will benefit will continue antibiotics and local wound care while in-house. Will likely need outpatient wound care following discharge. We will continue to monitor.    - The patient will be staffed with Dr. Mac Talley DPM   Podiatric Resident PGY1  Pager [phone] or [facility]  8/27/2022, 10:36 AM      Patient was seen and evaluated at bedside. Agree with residents assessment and treatment plan.   Yoli Barrientos DPM

## 2022-08-27 NOTE — CONSULTS
Clinical Pharmacy Progress Note    Vancomycin - Management by Pharmacy    Consult Date(s):  8/27  Consulting Provider(s):  Dr. Sasha Amezquita / Plan:  1)  LLE cellulitis and RLE laceration  - Vancomycin  Concurrent Antimicrobials: Azithromycin, Ceftriaxone (for possible PNA vs UTI)  Day of Vanc Therapy / Ordered Duration: 2 of 10  Current Dosing Method: Bayesian-guided AUC  Therapeutic Goal: AUC = <500 mg/L*hr  Plan:  Pt received 750mg IV x1 in ED last evening. Will continue with 1250mg IV q24h. Regimen predicts an AUC = 469 with trough = 12.9. Random level is ordered for 8/28 AM to further evaluate above regimen. Will continue to monitor clinical condition and make adjustments to regimen as appropriate. Please call with questions--  Thanks--  Hannah Mendez, PharmD, BCPS, BCGP  [id number] ([facility])   8/27/2022 11:48 AM      Interval update:  Podiatry consulted for RLE laceration and LLE cellulitis - imaging ordered. Subjective/Objective:   Kevin Brandt is a 80 y.o. y/o female with a PMHx that includes depression, HTN, HLD, neuropathy, OA, and DM 2 who is admitted after falling at home. Being evaluated / treated for generalized weakness, possible UTI, possible PNA, LLE cellulitis, and RLE laceration. Pharmacy is consulted to dose Vancomycin. Ht Readings from Last 1 Encounters:   08/27/22 5' 5\" (1.651 m)     Wt Readings from Last 1 Encounters:   08/27/22 127 lb 9 oz (57.9 kg)       Current and Prior Antimicrobials:  Azithromycin 500mg IV q24h (8/26-current)  Ceftriaxone 1000mg IV q24h (8/26-current)  Vancomycin - Pharmacy to dose   750mg IV x1 in ED 8/26   1250mg IV q24h (8/27-current)    Vanc Level(s) / Doses:  Date Time Dose Level / Type of Level Interpretation   8/28 0600 1250mg q24h Random = ordered           Note: Serum levels collected for AUC-based dosing may be high if collected in close proximity to the dose administered.  This is not necessarily an indicator of toxicity. Recent Labs     08/26/22  1623 08/27/22  0602   CREATININE <0.5* <0.5*   BUN 8 7   WBC 11.2* 9.6       Estimated Creatinine Clearance: 59 mL/min (based on SCr of 0.5 mg/dL).     Cultures & Sensitivities:  Date Site Micro Susceptibility / Result   8/26 Blood x2 sent    8/27 Wound culture ordered      Recent Labs     08/27/22  0602   PROCAL 0.12

## 2022-08-27 NOTE — PROGRESS NOTES
Pt. Oriented to self, situation, and place. Pt. Voiding well via purewick. Pt. Has swelling in left leg. Pt. Has laceration on right leg. Pt. Has weakness in both legs. All fall precautions in place and call light is within reach.

## 2022-08-28 LAB
ALBUMIN SERPL-MCNC: 3.1 G/DL (ref 3.4–5)
ANION GAP SERPL CALCULATED.3IONS-SCNC: 7 MMOL/L (ref 3–16)
BASOPHILS ABSOLUTE: 0 K/UL (ref 0–0.2)
BASOPHILS RELATIVE PERCENT: 0.6 %
BUN BLDV-MCNC: 9 MG/DL (ref 7–20)
CALCIUM SERPL-MCNC: 9.2 MG/DL (ref 8.3–10.6)
CHLORIDE BLD-SCNC: 98 MMOL/L (ref 99–110)
CO2: 29 MMOL/L (ref 21–32)
CREAT SERPL-MCNC: <0.5 MG/DL (ref 0.6–1.2)
EOSINOPHILS ABSOLUTE: 0.1 K/UL (ref 0–0.6)
EOSINOPHILS RELATIVE PERCENT: 0.8 %
ESTIMATED AVERAGE GLUCOSE: 165.7 MG/DL
GFR AFRICAN AMERICAN: >60
GFR NON-AFRICAN AMERICAN: >60
GLUCOSE BLD-MCNC: 199 MG/DL (ref 70–99)
GLUCOSE BLD-MCNC: 202 MG/DL (ref 70–99)
GLUCOSE BLD-MCNC: 283 MG/DL (ref 70–99)
GLUCOSE BLD-MCNC: 302 MG/DL (ref 70–99)
GLUCOSE BLD-MCNC: 329 MG/DL (ref 70–99)
HBA1C MFR BLD: 7.4 %
HCT VFR BLD CALC: 31.5 % (ref 36–48)
HEMOGLOBIN: 10.7 G/DL (ref 12–16)
LYMPHOCYTES ABSOLUTE: 1 K/UL (ref 1–5.1)
LYMPHOCYTES RELATIVE PERCENT: 13.3 %
MAGNESIUM: 1.9 MG/DL (ref 1.8–2.4)
MCH RBC QN AUTO: 31.5 PG (ref 26–34)
MCHC RBC AUTO-ENTMCNC: 34 G/DL (ref 31–36)
MCV RBC AUTO: 92.9 FL (ref 80–100)
MONOCYTES ABSOLUTE: 0.7 K/UL (ref 0–1.3)
MONOCYTES RELATIVE PERCENT: 8.3 %
NEUTROPHILS ABSOLUTE: 6 K/UL (ref 1.7–7.7)
NEUTROPHILS RELATIVE PERCENT: 77 %
PDW BLD-RTO: 13 % (ref 12.4–15.4)
PERFORMED ON: ABNORMAL
PHOSPHORUS: 3.2 MG/DL (ref 2.5–4.9)
PHOSPHORUS: 3.3 MG/DL (ref 2.5–4.9)
PLATELET # BLD: 232 K/UL (ref 135–450)
PMV BLD AUTO: 7.2 FL (ref 5–10.5)
POTASSIUM SERPL-SCNC: 4.2 MMOL/L (ref 3.5–5.1)
PREALBUMIN: 9.3 MG/DL (ref 20–40)
RBC # BLD: 3.39 M/UL (ref 4–5.2)
SODIUM BLD-SCNC: 134 MMOL/L (ref 136–145)
VANCOMYCIN RANDOM: 6.2 UG/ML
WBC # BLD: 7.8 K/UL (ref 4–11)

## 2022-08-28 PROCEDURE — 87086 URINE CULTURE/COLONY COUNT: CPT

## 2022-08-28 PROCEDURE — 80069 RENAL FUNCTION PANEL: CPT

## 2022-08-28 PROCEDURE — 87186 SC STD MICRODIL/AGAR DIL: CPT

## 2022-08-28 PROCEDURE — 84100 ASSAY OF PHOSPHORUS: CPT

## 2022-08-28 PROCEDURE — 87077 CULTURE AEROBIC IDENTIFY: CPT

## 2022-08-28 PROCEDURE — 83735 ASSAY OF MAGNESIUM: CPT

## 2022-08-28 PROCEDURE — 6360000002 HC RX W HCPCS: Performed by: INTERNAL MEDICINE

## 2022-08-28 PROCEDURE — 36415 COLL VENOUS BLD VENIPUNCTURE: CPT

## 2022-08-28 PROCEDURE — 80202 ASSAY OF VANCOMYCIN: CPT

## 2022-08-28 PROCEDURE — 87181 SC STD AGAR DILUTION PER AGT: CPT

## 2022-08-28 PROCEDURE — 2580000003 HC RX 258: Performed by: INTERNAL MEDICINE

## 2022-08-28 PROCEDURE — 6370000000 HC RX 637 (ALT 250 FOR IP): Performed by: INTERNAL MEDICINE

## 2022-08-28 PROCEDURE — 84134 ASSAY OF PREALBUMIN: CPT

## 2022-08-28 PROCEDURE — 1200000000 HC SEMI PRIVATE

## 2022-08-28 PROCEDURE — 85025 COMPLETE CBC W/AUTO DIFF WBC: CPT

## 2022-08-28 RX ORDER — CASTOR OIL AND BALSAM, PERU 788; 87 MG/G; MG/G
OINTMENT TOPICAL 2 TIMES DAILY
Status: DISCONTINUED | OUTPATIENT
Start: 2022-08-28 | End: 2022-08-31 | Stop reason: HOSPADM

## 2022-08-28 RX ORDER — DEXTROSE MONOHYDRATE 100 MG/ML
INJECTION, SOLUTION INTRAVENOUS CONTINUOUS PRN
Status: DISCONTINUED | OUTPATIENT
Start: 2022-08-28 | End: 2022-08-31 | Stop reason: HOSPADM

## 2022-08-28 RX ORDER — INSULIN LISPRO 100 [IU]/ML
0-8 INJECTION, SOLUTION INTRAVENOUS; SUBCUTANEOUS
Status: DISCONTINUED | OUTPATIENT
Start: 2022-08-28 | End: 2022-08-31 | Stop reason: HOSPADM

## 2022-08-28 RX ORDER — INSULIN LISPRO 100 [IU]/ML
0-4 INJECTION, SOLUTION INTRAVENOUS; SUBCUTANEOUS NIGHTLY
Status: DISCONTINUED | OUTPATIENT
Start: 2022-08-28 | End: 2022-08-31 | Stop reason: HOSPADM

## 2022-08-28 RX ORDER — FUROSEMIDE 20 MG/1
20 TABLET ORAL DAILY
Status: DISCONTINUED | OUTPATIENT
Start: 2022-08-28 | End: 2022-08-31 | Stop reason: HOSPADM

## 2022-08-28 RX ADMIN — FUROSEMIDE 20 MG: 20 TABLET ORAL at 17:05

## 2022-08-28 RX ADMIN — INSULIN GLARGINE 10 UNITS: 100 INJECTION, SOLUTION SUBCUTANEOUS at 22:28

## 2022-08-28 RX ADMIN — AZITHROMYCIN DIHYDRATE 500 MG: 500 INJECTION, POWDER, LYOPHILIZED, FOR SOLUTION INTRAVENOUS at 19:46

## 2022-08-28 RX ADMIN — TRAMADOL HYDROCHLORIDE 50 MG: 50 TABLET, COATED ORAL at 21:53

## 2022-08-28 RX ADMIN — Medication: at 21:58

## 2022-08-28 RX ADMIN — APIXABAN 5 MG: 5 TABLET, FILM COATED ORAL at 21:37

## 2022-08-28 RX ADMIN — APIXABAN 5 MG: 5 TABLET, FILM COATED ORAL at 09:56

## 2022-08-28 RX ADMIN — INSULIN LISPRO 3 UNITS: 100 INJECTION, SOLUTION INTRAVENOUS; SUBCUTANEOUS at 11:56

## 2022-08-28 RX ADMIN — Medication 1 CAPSULE: at 09:56

## 2022-08-28 RX ADMIN — GABAPENTIN 400 MG: 400 CAPSULE ORAL at 09:56

## 2022-08-28 RX ADMIN — VANCOMYCIN HYDROCHLORIDE 750 MG: 10 INJECTION, POWDER, LYOPHILIZED, FOR SOLUTION INTRAVENOUS at 09:47

## 2022-08-28 RX ADMIN — SODIUM CHLORIDE, PRESERVATIVE FREE 10 ML: 5 INJECTION INTRAVENOUS at 21:59

## 2022-08-28 RX ADMIN — GABAPENTIN 400 MG: 400 CAPSULE ORAL at 21:37

## 2022-08-28 RX ADMIN — INSULIN LISPRO 6 UNITS: 100 INJECTION, SOLUTION INTRAVENOUS; SUBCUTANEOUS at 17:06

## 2022-08-28 RX ADMIN — SODIUM CHLORIDE, PRESERVATIVE FREE 10 ML: 5 INJECTION INTRAVENOUS at 09:30

## 2022-08-28 RX ADMIN — CEFTRIAXONE 1000 MG: 1 INJECTION, POWDER, FOR SOLUTION INTRAMUSCULAR; INTRAVENOUS at 21:58

## 2022-08-28 ASSESSMENT — PAIN DESCRIPTION - LOCATION: LOCATION: LEG;BACK

## 2022-08-28 ASSESSMENT — PAIN SCALES - GENERAL: PAINLEVEL_OUTOF10: 7

## 2022-08-28 NOTE — PROGRESS NOTES
Note: Serum levels collected for AUC-based dosing may be high if collected in close proximity to the dose administered. This is not necessarily an indicator of toxicity. Recent Labs     08/26/22  1623 08/27/22  0602 08/28/22  0648   CREATININE <0.5* <0.5* <0.5*   BUN 8 7 9   WBC 11.2* 9.6 7.8         Estimated Creatinine Clearance: 59 mL/min (based on SCr of 0.5 mg/dL).     Cultures & Sensitivities:  Date Site Micro Susceptibility / Result   8/26 Blood x2 No growth to date    8/27 Wound culture ordered      Recent Labs     08/27/22  0602   PROCAL 0.12

## 2022-08-28 NOTE — PROGRESS NOTES
Patient alert and oriented x 3. She is hard of hearing, uses hearing aides. Bilateral legs wrapped by podiatry. Straight cathed for urine sample. Able to stand by bedside with assistance, gait belt and stedy. Bed in lowest position; bed alarm on and call light within reach. No new complaints or issues at this time.

## 2022-08-28 NOTE — PROGRESS NOTES
Progress Note    Admit Date: 8/26/2022    Patient's PCP: Dr. Shellie Richard MD      Chief complaint: Fall      HISTORY OF PRESENT ILLNESS:    This is a very pleasant 80 y.o. 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 She was walking to the restroom with her walker, and lost balance and fell. She had been feeling very weak. She denies hitting her head or trauma to any body parts. She denies loss of consciousness. She denies focal weakness prior to or since fall. She has not had chest pain, dizziness or lightheadedness    She was admitted   7/14/2022 -7/20/2022 with fall and large scalp laceration requiring staples with urinary tract infection, and was treated with ceftriaxone,  discharged on cefdinir. She was at rehab where she was not moving around much as per son, and she was found with a DVT 10 days PTA, and placed on eliquis. She was discharged from [facility] to home day PTA. In the ED, she was afebrile, and had fairly normal vitals. Lab work showed WBC of 11.2 with neutrophilia of 9.8, hemoglobin 11.9. Chemistries were fairly normal, lactic acid was marginally elevated at 2.8. Blood glucose 239.  29 less than 0.01    EKG showed no acute ST or T wave change    Head CT showed no acute process. Chest x-ray showed \". ..hazy bibasilar consolidations worse on the right. \"    She was given  750 mL of fluid and azithromycin, ceftriaxone, and vancomycin. Interval HPI  No new complaints  No chest pain  No fever    No dyspnea    LE edema improving    Legs wrapped per podiatry    Daughter at bedside. Medications: Reviewed      Allergies:  Lipitor, Cymbalta [duloxetine hcl], and Pregabalin        ROS: Review of Systems - Negative except as in HPI. All other systems reviewed and are negative.     PHYSICAL EXAM:  /63   Pulse 78   Temp 98.1 °F (36.7 °C)   Resp 16   Ht 5' 5\" (1.651 m)   Wt 127 lb 9 oz (57.9 kg)   SpO2 95%   BMI 21.23 kg/m²     Recent Labs     08/27/22  1643 08/27/22  2116 08/27/22  2117 08/28/22  0757 08/28/22  1130   POCGLU 282* 332* 341* 199* 329*       General appearance: alert, appears stated age, and cooperative  Head: Normocephalic, atraumatic  Neck: no adenopathy, no carotid bruit, no JVD  Lungs: clear to auscultation bilaterally  Heart: regular rate and rhythm, S1, S2 normal, no murmur  Abdomen: soft, non-tender; bowel sounds normal; no masses  Extremities: Dressing on bilateral LEs   Pulses: 2+ and symmetric      LABS:  Recent Labs     08/26/22  1623 08/27/22  0602 08/28/22  0648   WBC 11.2* 9.6 7.8   HGB 11.9* 11.1* 10.7*   HCT 35.1* 31.1* 31.5*    235 232                                                                    Recent Labs     08/26/22  1623 08/27/22  0602 08/28/22  0648   * 133* 134*   K 4.1 4.4 4.2   CL 97* 99 98*   CO2 24 26 29   BUN 8 7 9   CREATININE <0.5* <0.5* <0.5*   GLUCOSE 239* 211* 202*       No results for input(s): AST, ALT, ALB, BILITOT, ALKPHOS in the last 72 hours. Recent Labs     08/26/22  1748   TROPONINI <0.01       No results for input(s): BNP in the last 72 hours. No results found for: PHART, LSL2SBG, PO2ART  No results for input(s): INR in the last 72 hours. Recent Labs     08/26/22  2318   COLORU Yellow   PHUR 8.0   WBCUA 6-9*   RBCUA None seen   BACTERIA 2+*   CLARITYU Clear   SPECGRAV 1.015   LEUKOCYTESUR MODERATE*   UROBILINOGEN 0.2   BILIRUBINUR Negative   BLOODU Negative   GLUCOSEU 500*        Echocardiogram July 2022   Summary   Left ventricular cavity size is normal. There is mildly increased left   ventricular wall thickness. Ejection fraction is visually estimated to be   55-60%. No regional wall motion abnormalities are noted. Grade II diastolic   dysfunction with elevated LV filling pressures. Mitral annular calcification is present.  Thickened mitral valve lower extremity cellulitis and possible atypical pneumonia. - Checked PCT:  was 0.12.   - Will Update CXR, and repeat PCT 8/29/2022    - Consulted Podiatry for extensive LE edema with skin breakdown. Appreciate input: Left lower extremity CT ordered: Diffuse subcutaneous infiltration represent edema or cellulitis. Local wound care. - Her fall is likely sec to acute illness, infection: UTI    - She has lactic acidosis, but does not seem to have features of sepsis at this time. She is on metformin. With her hx of heart failure, no additional fluids, except she develops SIRS features. - She has chronic diastolic Heart failure. Recent echocardiogram in July reviewed; appears to show Grade II diastolic dysfunction with elevated LV filling pressures; Mod- severe MR and Mod LA dilatation. Will place on small dose lasix. Monitor I/o. Monitor vitals and labs. PT OT eval      The patient and / or the family were informed of the results of any tests, a time was given to answer questions, a plan was proposed and they agreed with plan. Full Code      Disposition: Pending Progress    From Home to recent hospital stay to rehab. Was just discharged from rehab day PTA.     PT/OT eval: Will likely need placement at eventual discharge; possibly in about 1-2 days    Junior Mu MD

## 2022-08-28 NOTE — PLAN OF CARE
Problem: Skin/Tissue Integrity  Goal: Absence of new skin breakdown  Description: 1. Monitor for areas of redness and/or skin breakdown  2. Assess vascular access sites hourly  3. Every 4-6 hours minimum:  Change oxygen saturation probe site  4. Every 4-6 hours:  If on nasal continuous positive airway pressure, respiratory therapy assess nares and determine need for appliance change or resting period. Outcome: Progressing  Note: No new skin breakdown observed, repositioning as tolerated. Problem: Safety - Adult  Goal: Free from fall injury  Outcome: Progressing     Problem: Pain  Goal: Verbalizes/displays adequate comfort level or baseline comfort level  Outcome: Progressing  Note: No PRN pain medication administered this shift; utilizing repositioning to promote comfort.

## 2022-08-28 NOTE — PLAN OF CARE
Problem: Skin/Tissue Integrity  Goal: Absence of new skin breakdown  Description: 1. Monitor for areas of redness and/or skin breakdown  2. Assess vascular access sites hourly  3. Every 4-6 hours minimum:  Change oxygen saturation probe site  4. Every 4-6 hours:  If on nasal continuous positive airway pressure, respiratory therapy assess nares and determine need for appliance change or resting period. 8/28/2022 1221 by Beinta Honeycutt RN  Outcome: Progressing  8/28/2022 0120 by Nahum Almaguer RN  Outcome: Progressing  Note: No new skin breakdown observed, repositioning as tolerated. Problem: ABCDS Injury Assessment  Goal: Absence of physical injury  Outcome: Progressing     Problem: Safety - Adult  Goal: Free from fall injury  8/28/2022 1221 by Benita Honeycutt RN  Outcome: Progressing  8/28/2022 0120 by Nahum Almaguer RN  Outcome: Progressing     Problem: Pain  Goal: Verbalizes/displays adequate comfort level or baseline comfort level  8/28/2022 0120 by Nahum Almaguer RN  Outcome: Progressing  Note: No PRN pain medication administered this shift; utilizing repositioning to promote comfort.

## 2022-08-28 NOTE — CONSULTS
Vancomycin has been discontinued. Pharmacy will sign off consult. If medication dosing is resumed, please re-consult pharmacy.     Steph Jarrell, PharmD  PGY-1 Pharmacy Resident  [facility]  [id number]  8/28/2022   2:39 PM

## 2022-08-28 NOTE — PROGRESS NOTES
Podiatric Surgery Daily Progress Note  [place] Fitzpatrick      Subjective :   Patient seen and examined this am at the bedside. Patient denies any acute overnight events. Patient denies N/V/F/C/SOB. Patient denies calf pain, thigh pain, chest pain. Review of Systems: A 12 point review of symptoms is unremarkable with the exception of the chief complaint. Patient specifically denies nausea, fever, vomiting, chills, shortness of breath, chest pain, abdominal pain, constipation or difficulty urinating. Objective     /63   Pulse 78   Temp 98.1 °F (36.7 °C)   Resp 16   Ht 5' 5\" (1.651 m)   Wt 127 lb 9 oz (57.9 kg)   SpO2 95%   BMI 21.23 kg/m²      I/O:  Intake/Output Summary (Last 24 hours) at 8/28/2022 1401  Last data filed at 8/28/2022 1358  Gross per 24 hour   Intake 976.16 ml   Output 1450 ml   Net -473.84 ml              Wt Readings from Last 3 Encounters:   08/27/22 127 lb 9 oz (57.9 kg)   07/20/22 115 lb 11.9 oz (52.5 kg)   12/18/17 111 lb 5.3 oz (50.5 kg)       LABS:    Recent Labs     08/27/22  0602 08/28/22  0648   WBC 9.6 7.8   HGB 11.1* 10.7*   HCT 31.1* 31.5*    232        Recent Labs     08/28/22  0648   *   K 4.2   CL 98*   CO2 29   PHOS 3.3   BUN 9   CREATININE <0.5*      No results for input(s): PROT, INR, APTT in the last 72 hours. LOWER EXTREMITY EXAMINATION    Dressing to b/l LE intact. No strikethrough noted to the external dressing. Serous drainage noted to the internal layers of the dressing. VASCULAR: DP and PT pulses non-palpable. Upon handheld doppler examination the DP/PT were biphasic b/l. CFT is brisk to the digits of the foot b/l. Skin temperature is warm to cool from proximal to distal with no focal calor noted on the right lower extremity and warm to warm on left lower extremity with generalized calor to the entire leg. No edema noted to the right lower extremity with  tense non-pitting edema noted to the left lower extremity.  No pain with calf compression b/l. NEUROLOGIC: Gross and epicritic sensation is diminished b/l. Protective sensation is diminished at all pedal sites b/l. DERMATOLOGIC: Nails 1-5 b/l are within normal limits of length, thickness, and color. Webspaces 1-4 b/l are clean, dry, and intact. No hyperkeratosis noted. No subcutaneous nodules, rashes, or other skin lesions noted. Right Lower Extremity:     Evidence of a skin tear to the lateral proximal aspect of the right leg approximately 4 cm x  5 cm with significant serious crust. No fluctuance, crepitus, purulence or hematoma expressed. The lesion does not tunnel or track with no probe to bone present. Left Lower extremity:     The entire soft tissue envelope of the left lower extremity is tense with diffuse areas of fluctuance and ecchymosis present. There is evidence of serous drainage present but no signs of open lesions or bullae present. MUSCULOSKELETAL: Muscle strength is 5/5 for all pedal groups tested. No pain with palpation of the foot or ankle b/l. Ankle joint ROM is decreased in dorsiflexion with the knee extended. No obvious biomechanical abnormalities. Imaging:       Xray tibfib left 8/26/22  Right knee, left tibia/fibula       HISTORY: Pain           Impression       Right knee: 2 views obtained. Lateral view is limited by oblique positioning. There is severe medial compartment osteoarthritis. There is lateral compartment chondrocalcinosis. Evaluation for knee effusion is technically limited. Left tibia/fibula: 2 views demonstrate a 5 mm well-circumscribed lytic lesion in the distal tibial diaphysis, of unclear etiology. In the absence of known primary neoplasm this is likely of no clinical significance. Otherwise no acute abnormality seen. Knee arthroplasty is standardly position. CT left Lower extremity:  Narrative   CT TIBIA FIBULA LEFT WO CONTRAST, CT ANKLE LEFT WO CONTRAST       INDICATION: Fall.  Left lower extremities pain.       COMPARISON: None. TECHNIQUE: CT of the left tibia and fibula and CT of the left ankle were obtained without IV contrast. The axial data sets were reformatted in the coronal and sagittal planes. Up-to-date CT equipment and radiation dose reduction techniques were employed. FINDINGS:        Left total knee arthroplasty in place. Streak artifact from hardware. No significant abnormality identified. No fracture or malalignment in the tibia or fibula. No fracture or malalignment in the left ankle. Severe diffuse swelling and subcutaneous infiltration throughout the lower extremity. No soft tissue gas. No drainable fluid collection. Impression       1. No acute osseous findings. 2.  Diffuse subcutaneous infiltration represent edema or cellulitis. IMPRESSION/RECOMMENDATIONS:    - Edema left lower extremity secondary to fall; less likely a cellulitic process  - Laceration right lower extremity  - Recent history of DVT left lower extremity  - Diabetes type II  - History of hypertension  - history of Hyperlipidemia      -Patient examined and evaluated at the bedside   -VSS. No Leukocytosis noted (7.8)  -ESR 9 CRP 11.1  -Imaging reviewed, impressions noted above  -Steri-Strips is applied by the ED were left in place, dressing was applied to the right lower extremity consisting of Adaptic, gauze, Kerlix, Ace.  -Dressing applied to the left lower extremity consisting of gauze, Kerlix, Ace.  -Dressing to remain clean dry and intact  -Elevate lower extremity b/l  -Continue with Eliquis at this time due to recent history of DVT left lower extremity.  -Antibiotics: azithromycin, ceftriaxone   -Weightbearing as tolerated and up with assistance; patient a fall risk. - Fall precautions     DISPO: Laceration right lower extremity; Edema of the left lower extremity secondary to fall, less likely a cellulitc process. History of DVT left lower extremity within the last 2 weeks. Continue antibiotics per primary team.  Labs and imaging reviewed. Patient okay to discharge from podiatric standpoint pending medical clearance and outpatient antibiotic recommendations. Will  need outpatient wound care following discharge. We will continue to monitor while in-house. The patient was seen and evaluated bedside with JAVIER Martin DPM   Podiatric Resident PGY1  Pager [phone] or [employer]  8/28/2022, 2:02 PM     Patient was seen and evaluated at bedside. Agree with residents assessment and treatment plan.   Dimas Scott DPM

## 2022-08-29 LAB
ALBUMIN SERPL-MCNC: 3 G/DL (ref 3.4–5)
ANION GAP SERPL CALCULATED.3IONS-SCNC: 6 MMOL/L (ref 3–16)
BASOPHILS ABSOLUTE: 0 K/UL (ref 0–0.2)
BASOPHILS RELATIVE PERCENT: 0.3 %
BUN BLDV-MCNC: 9 MG/DL (ref 7–20)
CALCIUM SERPL-MCNC: 8.9 MG/DL (ref 8.3–10.6)
CHLORIDE BLD-SCNC: 99 MMOL/L (ref 99–110)
CO2: 30 MMOL/L (ref 21–32)
CREAT SERPL-MCNC: <0.5 MG/DL (ref 0.6–1.2)
EOSINOPHILS ABSOLUTE: 0.2 K/UL (ref 0–0.6)
EOSINOPHILS RELATIVE PERCENT: 2.9 %
GFR AFRICAN AMERICAN: >60
GFR NON-AFRICAN AMERICAN: >60
GLUCOSE BLD-MCNC: 193 MG/DL (ref 70–99)
GLUCOSE BLD-MCNC: 202 MG/DL (ref 70–99)
GLUCOSE BLD-MCNC: 222 MG/DL (ref 70–99)
GLUCOSE BLD-MCNC: 265 MG/DL (ref 70–99)
GLUCOSE BLD-MCNC: 293 MG/DL (ref 70–99)
HCT VFR BLD CALC: 30.3 % (ref 36–48)
HEMOGLOBIN: 10.1 G/DL (ref 12–16)
LYMPHOCYTES ABSOLUTE: 1 K/UL (ref 1–5.1)
LYMPHOCYTES RELATIVE PERCENT: 17.6 %
MCH RBC QN AUTO: 30.9 PG (ref 26–34)
MCHC RBC AUTO-ENTMCNC: 33.4 G/DL (ref 31–36)
MCV RBC AUTO: 92.7 FL (ref 80–100)
MONOCYTES ABSOLUTE: 0.6 K/UL (ref 0–1.3)
MONOCYTES RELATIVE PERCENT: 10 %
NEUTROPHILS ABSOLUTE: 4 K/UL (ref 1.7–7.7)
NEUTROPHILS RELATIVE PERCENT: 69.2 %
PDW BLD-RTO: 12.8 % (ref 12.4–15.4)
PERFORMED ON: ABNORMAL
PHOSPHORUS: 3.2 MG/DL (ref 2.5–4.9)
PLATELET # BLD: 226 K/UL (ref 135–450)
PMV BLD AUTO: 7.3 FL (ref 5–10.5)
POTASSIUM SERPL-SCNC: 4 MMOL/L (ref 3.5–5.1)
PROCALCITONIN: 0.18 NG/ML (ref 0–0.15)
RBC # BLD: 3.27 M/UL (ref 4–5.2)
SODIUM BLD-SCNC: 135 MMOL/L (ref 136–145)
WBC # BLD: 5.8 K/UL (ref 4–11)

## 2022-08-29 PROCEDURE — 6360000002 HC RX W HCPCS: Performed by: INTERNAL MEDICINE

## 2022-08-29 PROCEDURE — 2580000003 HC RX 258: Performed by: INTERNAL MEDICINE

## 2022-08-29 PROCEDURE — 1200000000 HC SEMI PRIVATE

## 2022-08-29 PROCEDURE — 36415 COLL VENOUS BLD VENIPUNCTURE: CPT

## 2022-08-29 PROCEDURE — 6370000000 HC RX 637 (ALT 250 FOR IP): Performed by: INTERNAL MEDICINE

## 2022-08-29 PROCEDURE — 97530 THERAPEUTIC ACTIVITIES: CPT

## 2022-08-29 PROCEDURE — 84145 PROCALCITONIN (PCT): CPT

## 2022-08-29 PROCEDURE — 80069 RENAL FUNCTION PANEL: CPT

## 2022-08-29 PROCEDURE — 97535 SELF CARE MNGMENT TRAINING: CPT

## 2022-08-29 PROCEDURE — 97162 PT EVAL MOD COMPLEX 30 MIN: CPT

## 2022-08-29 PROCEDURE — 97166 OT EVAL MOD COMPLEX 45 MIN: CPT

## 2022-08-29 PROCEDURE — 97116 GAIT TRAINING THERAPY: CPT

## 2022-08-29 PROCEDURE — 85025 COMPLETE CBC W/AUTO DIFF WBC: CPT

## 2022-08-29 RX ADMIN — APIXABAN 5 MG: 5 TABLET, FILM COATED ORAL at 08:11

## 2022-08-29 RX ADMIN — Medication: at 20:41

## 2022-08-29 RX ADMIN — SODIUM CHLORIDE, PRESERVATIVE FREE 10 ML: 5 INJECTION INTRAVENOUS at 08:11

## 2022-08-29 RX ADMIN — INSULIN GLARGINE 10 UNITS: 100 INJECTION, SOLUTION SUBCUTANEOUS at 20:39

## 2022-08-29 RX ADMIN — AZITHROMYCIN DIHYDRATE 500 MG: 500 INJECTION, POWDER, LYOPHILIZED, FOR SOLUTION INTRAVENOUS at 18:30

## 2022-08-29 RX ADMIN — APIXABAN 5 MG: 5 TABLET, FILM COATED ORAL at 20:37

## 2022-08-29 RX ADMIN — INSULIN LISPRO 4 UNITS: 100 INJECTION, SOLUTION INTRAVENOUS; SUBCUTANEOUS at 11:15

## 2022-08-29 RX ADMIN — FUROSEMIDE 20 MG: 20 TABLET ORAL at 08:11

## 2022-08-29 RX ADMIN — Medication: at 08:10

## 2022-08-29 RX ADMIN — Medication 1 CAPSULE: at 08:11

## 2022-08-29 RX ADMIN — INSULIN LISPRO 2 UNITS: 100 INJECTION, SOLUTION INTRAVENOUS; SUBCUTANEOUS at 16:55

## 2022-08-29 RX ADMIN — SODIUM CHLORIDE, PRESERVATIVE FREE 10 ML: 5 INJECTION INTRAVENOUS at 20:38

## 2022-08-29 RX ADMIN — GABAPENTIN 400 MG: 400 CAPSULE ORAL at 08:12

## 2022-08-29 RX ADMIN — CEFTRIAXONE 1000 MG: 1 INJECTION, POWDER, FOR SOLUTION INTRAMUSCULAR; INTRAVENOUS at 23:15

## 2022-08-29 RX ADMIN — GABAPENTIN 400 MG: 400 CAPSULE ORAL at 20:37

## 2022-08-29 ASSESSMENT — ENCOUNTER SYMPTOMS
GASTROINTESTINAL NEGATIVE: 1
ALLERGIC/IMMUNOLOGIC NEGATIVE: 1
RESPIRATORY NEGATIVE: 1
EYES NEGATIVE: 1

## 2022-08-29 NOTE — PROGRESS NOTES
Pt to neurological baseline, VSS on RA; endorsing pain but well controlled per MAR. Bilat LE cellulitis improving. Tolerating PO without emesis, voiding adequately. Fall and safety precautions maintained.

## 2022-08-29 NOTE — PROGRESS NOTES
Progress Note    Admit Date: 8/26/2022    Patient's PCP: Dr. Benita Honeycutt MD      Chief complaint: Fall      HISTORY OF PRESENT ILLNESS:    This is a very pleasant 80 y.o. 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 She was walking to the restroom with her walker, and lost balance and fell. She had been feeling very weak. She was admitted   7/14/2022 -7/20/2022 with fall and large scalp laceration requiring staples with urinary tract infection, and was treated with ceftriaxone,  discharged on cefdinir. She was at rehab where she was not moving around much as per son, and she was found with a DVT 10 days PTA, and placed on eliquis. She was discharged from [facility] to home day PTA. In the ED, she was afebrile, and had fairly normal vitals. Lab work showed WBC of 11.2 with neutrophilia of 9.8, hemoglobin 11.9. Chemistries were fairly normal, lactic acid was marginally elevated at 2.8. Blood glucose 239.  29 less than 0.01    EKG showed no acute ST or T wave change    Head CT showed no acute process. Chest x-ray showed \". ..hazy bibasilar consolidations worse on the right. \"    She was given  750 mL of fluid and azithromycin, ceftriaxone, and vancomycin. Interval HPI  No new complaints  No chest pain  No fever    No dyspnea    LE edema improving    Legs wrapped per podiatry          Medications: Reviewed      Allergies:  Lipitor, Cymbalta [duloxetine hcl], and Pregabalin        ROS: Review of Systems - Negative except as in HPI. All other systems reviewed and are negative.     PHYSICAL EXAM:  /69   Pulse 72   Temp 98 °F (36.7 °C) (Oral)   Resp 16   Ht 5' 5\" (1.651 m)   Wt 127 lb 3.3 oz (57.7 kg)   SpO2 96%   BMI 21.17 kg/m²     Recent Labs     08/28/22  1130 08/28/22  1655 08/28/22  2123 08/29/22  0726 08/29/22  1051   POCGLU 329* 302* 283* 193* 293*       General appearance: alert, appears stated age, and cooperative  Head: Normocephalic, atraumatic  Neck: no adenopathy, no carotid bruit, no JVD  Lungs: clear to auscultation bilaterally  Heart: regular rate and rhythm, S1, S2 normal, no murmur  Abdomen: soft, non-tender; bowel sounds normal; no masses  Extremities: Dressing on bilateral LEs   Pulses: 2+ and symmetric      LABS:  Recent Labs     08/27/22  0602 08/28/22  0648 08/29/22  0551   WBC 9.6 7.8 5.8   HGB 11.1* 10.7* 10.1*   HCT 31.1* 31.5* 30.3*    232 226                                                                  Recent Labs     08/27/22  0602 08/28/22  0648 08/29/22  0551   * 134* 135*   K 4.4 4.2 4.0   CL 99 98* 99   CO2 26 29 30   BUN 7 9 9   CREATININE <0.5* <0.5* <0.5*   GLUCOSE 211* 202* 202*     No results for input(s): AST, ALT, ALB, BILITOT, ALKPHOS in the last 72 hours. Recent Labs     08/26/22  1748   TROPONINI <0.01     No results for input(s): BNP in the last 72 hours. No results found for: PHART, ZKS8PNI, PO2ART  No results for input(s): INR in the last 72 hours. Recent Labs     08/26/22  2318   COLORU Yellow   PHUR 8.0   WBCUA 6-9*   RBCUA None seen   BACTERIA 2+*   CLARITYU Clear   SPECGRAV 1.015   LEUKOCYTESUR MODERATE*   UROBILINOGEN 0.2   BILIRUBINUR Negative   BLOODU Negative   GLUCOSEU 500*      Echocardiogram July 2022   Summary   Left ventricular cavity size is normal. There is mildly increased left   ventricular wall thickness. Ejection fraction is visually estimated to be   55-60%. No regional wall motion abnormalities are noted. Grade II diastolic   dysfunction with elevated LV filling pressures. Mitral annular calcification is present. Thickened mitral valve leaflets. Moderate to severe mitral regurgitation. The left atrium is moderately dilated. Normal right ventricular size and function.          Assessment & Plan:    80 y.o. 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 Probable complicated UTI: POA  Generalized weakness  Recurrent falls  Possible Left LE cellulitis: POA  Recent Left LE DVT  Laceration right lower extremity: POA  Abnormal chest x-ray  Lactic acidosis  Diabetes mellitus type 2; complicated by peripheral neuropathy  Chronic diastolic heart failure      Patient presented with generalized weakness and fall    Was admitted last month with fall resulting in head laceration requiring staples. Urinalysis done and was positive. Appears urine cx was negative, and this was felt due to having been on nitrofurantoin PTA. Was treated empirically with ceftriaxone and discharged on cefdinir. Discharged to SNF; from SNF to Home just Day PTA. Re-presents to ED following another fall    Appears sustained right LE laceration following the fall. On presentation,  urine showed features of UTI. She did admit to urinary frequency and dysuria. Ordered UA:  showed features of infection, but did not reflex culture as WBC was less than 10. Urine culture ordered. ? Not done. Re-order: To be interpreted with knowledge patient already received antibiotic about 1-2 days  - Bladder scans ordered with recurrent UTIs: re-ordered and discussed with RN. She had abnormal CXR, with question of pneumonia and ED also felt she had left leg cellulitis. Has h/e not had respiratory s/s. Left leg also had some erythema and swelling, possibly sec to trauma, recent DVT. (Had Left LE DVT at facility, and was placed on [address] about 10 days PTA). Was placed in ED on IV vancomycin as well as ceftriaxone and azithromycin for possible lower extremity cellulitis and possible atypical pneumonia. - Checked PCT:  was 0.12.   - Will Update CXR, and repeat PCT 8/29/2022    - Consulted Podiatry for extensive LE edema with skin breakdown.   Appreciate input: Left lower extremity CT ordered: Diffuse subcutaneous infiltration represent edema or cellulitis. Local wound care. - Her fall is likely sec to acute illness, infection: UTI    - She has lactic acidosis, but does not seem to have features of sepsis at this time. She is on metformin. With her hx of heart failure, no additional fluids, except she develops SIRS features. - She has chronic diastolic Heart failure. Recent echocardiogram in July reviewed; appears to show Grade II diastolic dysfunction with elevated LV filling pressures; Mod- severe MR and Mod LA dilatation. Will place on small dose lasix. Monitor I/o. Monitor vitals and labs. PT OT eval      The patient and / or the family were informed of the results of any tests, a time was given to answer questions, a plan was proposed and they agreed with plan.     Full Code      Disposition: Possible discharge tomorrow, needs placement    Yonatan Martínez MD

## 2022-08-29 NOTE — PLAN OF CARE
Problem: Discharge Planning  Goal: Discharge to home or other facility with appropriate resources  Outcome: Progressing  Flowsheets (Taken 8/29/2022 0740)  Discharge to home or other facility with appropriate resources: Identify barriers to discharge with patient and caregiver     Problem: Skin/Tissue Integrity  Goal: Absence of new skin breakdown  Description: 1. Monitor for areas of redness and/or skin breakdown  2. Assess vascular access sites hourly  3. Every 4-6 hours minimum:  Change oxygen saturation probe site  4. Every 4-6 hours:  If on nasal continuous positive airway pressure, respiratory therapy assess nares and determine need for appliance change or resting period.   8/29/2022 0900 by Diana Paris RN  Outcome: Progressing     Problem: ABCDS Injury Assessment  Goal: Absence of physical injury  Outcome: Progressing     Problem: Safety - Adult  Goal: Free from fall injury  8/29/2022 0900 by Diana Paris RN  Outcome: Progressing     Problem: Chronic Conditions and Co-morbidities  Goal: Patient's chronic conditions and co-morbidity symptoms are monitored and maintained or improved  Outcome: Progressing  Flowsheets (Taken 8/29/2022 0740)  Care Plan - Patient's Chronic Conditions and Co-Morbidity Symptoms are Monitored and Maintained or Improved:   Monitor and assess patient's chronic conditions and comorbid symptoms for stability, deterioration, or improvement   Collaborate with multidisciplinary team to address chronic and comorbid conditions and prevent exacerbation or deterioration   Update acute care plan with appropriate goals if chronic or comorbid symptoms are exacerbated and prevent overall improvement and discharge     Problem: Pain  Goal: Verbalizes/displays adequate comfort level or baseline comfort level  Outcome: Progressing

## 2022-08-29 NOTE — DISCHARGE INSTR - COC
Continuity of Care Form    Patient Name: Bang Mahmood   :  1925  MRN:  [id number]    Admit date:  2022  Discharge date:  22    Code Status Order: Full Code   Advance Directives:     Admitting Physician:  Terell Kurtz MD  PCP: Rhina Brown MD    Discharging Nurse: [facility] Unit/Room#: 8656/5998-86  Discharging Unit Phone Number: [phone]    Emergency Contact:   Extended Emergency Contact Information  Primary Emergency Contact: VivianakourtneyBob  Address: [place], [address] [place] [address] Phone: [phone]  Relation: Child  Secondary Emergency Contact: [address] [facility] of [address] [place] [address] Phone: [phone]  Work Phone: [phone]  Mobile Phone: [phone]  Relation: Child    Past Surgical History:  Past Surgical History:   Procedure Laterality Date    CYSTOCELE REPAIR      HYSTERECTOMY (CERVIX STATUS UNKNOWN)      ovaries intact    RECTOCELE REPAIR      TOTAL KNEE ARTHROPLASTY         Immunization History:   Immunization History   Administered Date(s) Administered    COVID-19, PFIZER PURPLE top, DILUTE for use, (age 15 y+), 30mcg/0.3mL 2021, 2021, 10/22/2021    Influenza Vaccine, unspecified formulation 2009, 2010, 2012, 2013, 10/09/2014, 12/10/2015, 2016, 2017    Influenza Virus Vaccine 2009    Influenza Whole 2010    Influenza, High Dose (Fluzone 65 yrs and older) 2011, 2012, 2013, 10/09/2014    Pneumococcal Conjugate 13-valent (Gtryghf13) 2015    Pneumococcal Polysaccharide ([id number]) 2007    Td, unspecified formulation 2003    Tdap (Boostrix, Adacel) 2003, 2022       Active Problems:  Patient Active Problem List   Diagnosis Code    Hyperlipidemia E78.5    Essential hypertension with goal blood pressure less than 150/90 I10    Type 2 diabetes mellitus ([address] E11.9    Diverticulitis K57.92    Exposure [id number]    Peripheral edema R60.9    Balance disorder R26.89    Constipation, chronic K59.09    Diabetic neuropathy (HCC) E11.40    B12 deficiency E53.8    Sleep apnea G47.30    Depression F32. A    Low magnesium levels R79.0    Age-related macular degeneration, dry H35.3190    Actinic keratosis L57.0    Benign neoplasm of skin D23.9    Cancer, skin, squamous cell C44.92    Contact dermatitis and other eczema, due to unspecified cause L25.9    DDD (degenerative disc disease), cervical M50.30    Dermatophytosis of foot B35.3    Diabetic polyneuropathy associated with type 2 diabetes mellitus (HCC) E11.42    Dysuria R30.0    Inflamed seborrheic keratosis L82.0    Osteoarthrosis involving lower leg [id number]    Urinary incontinence R32    Pneumonia J18.9       Isolation/Infection:   Isolation            No Isolation          Patient Infection Status       None to display            Nurse Assessment:  Last Vital Signs: /73   Pulse 72   Temp 98.2 °F (36.8 °C) (Axillary)   Resp 18   Ht 5' 5\" (1.651 m)   Wt 127 lb 9 oz (57.9 kg)   SpO2 95%   BMI 21.23 kg/m²     Last documented pain score (0-10 scale): Pain Level: 7  Last Weight:   Wt Readings from Last 1 Encounters:   08/27/22 127 lb 9 oz (57.9 kg)     Mental Status:  oriented, alert, coherent, and logical    IV Access:  - None    Nursing Mobility/ADLs:  Walking   Assisted  Transfer  Assisted  Bathing  Assisted  Dressing  Assisted  Toileting  Assisted  Feeding  Assisted  Med Admin  Assisted  Med Delivery   whole    Wound Care Documentation and Therapy:  Wound 07/15/22 Brachial Anterior;Distal;Left (Active)   Number of days: 45       Wound 07/15/22 Buttocks (Active)   Number of days: 45       Wound 07/15/22 Head Left;Upper Laceration closed with 10 staples. (Active)   Number of days: 44       Wound 08/27/22 Pretibial Right (Active)   Dressing Status Clean;Dry; Intact 08/29/22 0030   Dressing/Treatment Ace wrap 08/29/22 0030   Number of days: 2 Elimination:  Continence: Bowel: Yes  Bladder: Yes  Urinary Catheter: None   Colostomy/Ileostomy/Ileal Conduit: No       Date of Last BM:     Intake/Output Summary (Last 24 hours) at 8/29/2022 0613  Last data filed at 8/28/2022 2123  Gross per 24 hour   Intake 966.83 ml   Output 1100 ml   Net -133.17 ml     I/O last 3 completed shifts: In: 1216.2 [P.O.:720; IV Piggyback:496.2]  Out: 2150 [Urine:2150]    Safety Concerns: At Risk for Falls    Impairments/Disabilities:      None    Nutrition Therapy:  Current Nutrition Therapy:   - Oral Diet:  General    Routes of Feeding: Oral  Liquids: No Restrictions  Daily Fluid Restriction: no  Last Modified Barium Swallow with Video (Video Swallowing Test): not done    Treatments at the Time of Hospital Discharge:   Respiratory Treatments:   Oxygen Therapy:  is not on home oxygen therapy.   Ventilator:    - No ventilator support    Rehab Therapies: Physical Therapy and Occupational Therapy  Weight Bearing Status/Restrictions: No weight bearing restrictions  Other Medical Equipment (for information only, NOT a DME order):  walker  Other Treatments:     Patient's personal belongings (please select all that are sent with patient):  {[facility] DME Belongings:[id number]}    RN SIGNATURE:  Electronically signed by Ana Horn RN on 8/31/22 at 11:31 AM EDT    CASE MANAGEMENT/SOCIAL WORK SECTION    Inpatient Status Date: 8/26/22    Readmission Risk Assessment Score:  Readmission Risk              Risk of Unplanned Readmission:  15           Discharging to Facility/ Agency   [facility] [address] [facility]    [phone]             Dialysis Facility (if applicable)   N/a    / signature: Electronically signed by RAMONA Granda, LSW on 8/31/22 at 9:22 AM EDT    PHYSICIAN SECTION    Prognosis: Fair    Condition at Discharge: Stable    Rehab Potential (if transferring to Rehab): Fair    Recommended Labs or Other Treatments After Discharge: PTOT, nursing , palliative care    Podiatry Wound Care Discharge Instructions  Please perform every other day dressing changes to bilateral lower extremity as follows  -Apply a non-adherent dressing over the laceration to the wound on the  right lower leg  -Next apply gauze to the top of the b/l foot, ankle, and leg  -Next loosely wrap the bilateral lower extremity with Kerlix starting from just in front of the toes and ending just below the knee  -Next gently wrap the bilateral foot with 4 inch Ace bandage starting from just in front of the toes and ending just above the ankle  -Next gently wrap the bilateral leg with 6 inch Ace bandage starting from just above the ankle and ending just below the knee  Patient is  weightbearing Bilateral lower extremity as tolerated and with assistance  Please follow-up with Dr. Luis Shaffer DPJENNI for wound care within 1 week of discharge. For any questions, please contact Dr. Ash Tucker [address] [place] [address],[address]    Physician Certification: I certify the above information and transfer of Ulises Quevedo  is necessary for the continuing treatment of the diagnosis listed and that she requires [facility] for less 30 days.      Update Admission H&P: No change in H&P    PHYSICIAN SIGNATURE:  Electronically signed by Ariadna Solorio MD on 8/31/22 at 8:46 AM EDT

## 2022-08-29 NOTE — PROGRESS NOTES
Physical Therapy  Facility/Department: [place] [address]  Physical Therapy Initial Assessment    Name: Yulissa Aguiar  : 1925  MRN: [id number]  Date of Service: 2022    Discharge Recommendations:  Yulissa Aguiar scored a 17/24 on the AM-PAC short mobility form. Current research shows that an AM-PAC score of 17 or less is typically not associated with a discharge to the patient's home setting. Based on the patient's AM-PAC score and their current functional mobility deficits, it is recommended that the patient have 3-5 sessions per week of Physical Therapy at d/c to increase the patient's independence. Please see assessment section for further patient specific details. If patient discharges prior to next session this note will serve as a discharge summary. Please see below for the latest assessment towards goals. PT Equipment Recommendations  Equipment Needed: Yes  Mobility Devices: Dina Blanco: Rolling  Other: defer to next level, if pt d/c home recommend rolling walker      Patient Diagnosis(es): The primary encounter diagnosis was Cellulitis of left lower extremity. Diagnoses of Fall, initial encounter and Atypical pneumonia were also pertinent to this visit. Past Medical History:  has a past medical history of Age-related macular degeneration, dry, Anemia B12 deficiency, Balance disorder, Cancer, skin, squamous cell, Chronic constipation, Colorectal polyps, Depression, Diverticulosis, Epistaxis, Hyperlipidemia, Hypertension, Low magnesium levels, Neuropathy, Osteoarthritis, Peripheral edema, Pruritis, Recurrent UTI, Small vessel disease, cerebrovascular, Tuberculosis, Type II or unspecified type diabetes mellitus without mention of complication, not stated as uncontrolled, Unspecified sleep apnea, and Urgency incontinence. Past Surgical History:  has a past surgical history that includes Cystocele repair; Rectocele repair;  Total knee arthroplasty; and Pertinent Hx: 80 y.o. 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 She was walking to the restroom with her walker, and lost balance and fell. She had been feeling very weak. She denies hitting her head or trauma to any body parts. She denies loss of consciousness. She denies focal weakness prior to or since fall. She has not had chest pain, dizziness or lightheadedness. She was recently hospitalized 7/14/2022 -7/20/2022 with fall and large scalp laceration requiring staples with urinary tract infection, and was treated with ceftriaxone,  discharged on cefdinir. Pt went to Rehab and discharged home day prior to admission for this stay due to a fall.   Family / Caregiver Present: No  Diagnosis: pneumonia  Subjective  Subjective: Pt supine in bed upon arrival and agreeable to eval.         Social/Functional History  Social/Functional History  Lives With: Son  Type of Home: [facility]  Home Layout: Two level, Able to Live on Main level with bedroom/bathroom  Home Access: Stairs to enter without rails  Entrance Stairs - Number of Steps: 2  Bathroom Shower/Tub: Walk-in shower, Shower chair with back  [employer]: Standard  Bathroom Equipment: Hand-held shower, Grab bars in shower, Grab bars around toilet  Bathroom Accessibility: Accessible  Home Equipment: Walker, 4 wheeled (gait belt)  Has the patient had two or more falls in the past year or any fall with injury in the past year?: Yes (about 20, pt notes she has come to the hospital for previous falls and patient notes she has been using her rollator when she falls)  Receives Help From: Family (son is home with patient all the time)  ADL Assistance: Independent  Homemaking Assistance: Needs assistance (Pt son completes all cooking, cleaning and laundry)  Homemaking Responsibilities: No  Ambulation Assistance: Independent  Transfer Assistance: Independent  Active : No  Patient's  Info: son drives  Vision/Hearing  Vision  Vision: Impaired  Vision Exceptions: Wears glasses at all times  Hearing  Hearing: Exceptions to [facility]  Hearing Exceptions: Left hearing aid;Right hearing aid    Cognition         Objective   Heart Rate: 72  Heart Rate Source: Monitor  BP: 111/69  BP Location: Right upper arm  BP Method: Automatic  Patient Position: Up in chair  MAP (Calculated): 83  Resp: 16  SpO2: 96 %  O2 Device: None (Room air)     Observation/Palpation  Observation: B LE ace wrapped (Simultaneous filing. User may not have seen previous data.)  Gross Assessment  AROM: Generally decreased, functional  Strength: Generally decreased, functional        Bed Mobility Training  Bed Mobility Training: Yes  Overall Level of Assistance: Stand-by assistance; Additional time  Supine to Sit: Additional time;Stand-by assistance  Sit to Supine:  (left in chair end of sessoin)  Balance  Sitting: Intact (pt sat on bedside commode during sponge bath without back supported needed)  Standing: With support (pt had one LOB in static stance and needed mod A to correct)  Standing - Static: Poor (posterior lean)  Transfer Training  Transfer Training: Yes  Overall Level of Assistance: Additional time;Minimum assistance  Sit to Stand: Minimum assistance;Contact-guard assistance  Stand to Sit: Minimum assistance;Contact-guard assistance  Toilet Transfer: Minimum assistance; Additional time  Gait Training  Gait Training: Yes  Right Side Weight Bearing: As tolerated  Left Side Weight Bearing: As tolerated  Gait  Overall Level of Assistance: Minimum assistance; Additional time  Base of Support: Widened  Speed/Katja: Slow  Step Length: Left shortened;Right shortened  Stance: Left increased;Right increased  Gait Abnormalities: Antalgic (decreased heel/ toe gait pattern, decreased step height)  Distance (ft): 10 Feet (10' + 20')  Assistive Device: Valentino Sings, rolling      AM-PAC Score  AM-PAC Inpatient Mobility Raw Score : 17 (08/29/22 1102)  AM-PAC Inpatient T-Scale Score : 42.13 (08/29/22 1102)  Mobility Inpatient CMS 0-100% Score: 50.57 (08/29/22 1102)  Mobility Inpatient CMS G-Code Modifier : CK (08/29/22 1102)       Goals  Short Term Goals  Time Frame for Short term goals: by discharge  Short term goal 1: Pt to complete bed mobility Ind  Short term goal 2: Pt to complete functional transfers Ind  Short term goal 3: Pt to ambulate 48' Ind with LRAD  Short term goal 4: Pt to ascend/ descend 2 steps without HR Ind  Patient Goals   Patient goals : to return home       Education  Patient Education  Education Given To: Patient  Education Provided: Role of Therapy;Plan of Care  Education Method: Verbal  Barriers to Learning: Hearing  Education Outcome: Verbalized understanding      Therapy Time   Individual Concurrent Group Co-treatment   Time In 1008         Time Out 1049         Minutes 41         Timed Code Treatment Minutes: [address] Nuria Chen, [facility]4 [facility], [address] [place] [address] PT [id number]

## 2022-08-29 NOTE — CARE COORDINATION
Case Management Assessment           Initial Evaluation                Date / Time of Evaluation: 8/29/2022 3:31 PM                 Assessment Completed by: RAMONA Vora    Patient Name: Sariah Garcia     YOB: 1925  Diagnosis: Pneumonia [J18.9]  Atypical pneumonia [J18.9]  Cellulitis of left lower extremity [L03.116]  Fall, initial encounter [W19. XXXA]     Date / Time: 8/26/2022  3:46 PM    Patient Admission Status: Inpatient    If patient is discharged prior to next notation, then this note serves as note for discharge by case management. Current PCP: Felix Poe MD  Clinic Patient: No    Chart Reviewed: Yes  Patient/ Family Interviewed: Yes    Initial assessment completed at bedside with: patient and son Tammy Suarez    Hospitalization in the last 30 days: No    Emergency Contacts:  Extended Emergency Contact Information  Primary Emergency Contact: Bob Yusuf  Address: [address] [address] [place] [address] Phone: [phone]  Relation: Child  Secondary Emergency Contact: [address] [place] [address] Phone: [phone]  Work Phone: [phone]  Mobile Phone: [phone]  Relation: Child    Advance Directives:   Code Status: [address]: No  Agent:   Contact Number:     Copy present: No     In paper Chart: No    Scanned into EMR No    Financial  Payor: Jamie Delatorre / Plan: MEDICARE PART A AND B / Product Type: *No Product type* /     Pre-cert required for SNF: No    Pharmacy    [facility] [id number] [address]  Phone: [phone] Fax: [phone]      Potential assistance Purchasing Medications: Potential Assistance Purchasing Medications: No  Does Patient want to participate in local refill/ meds to beds program?: Yes    Meds To Beds General Rules:  1.  Can ONLY be done Monday- Friday between 8:30am-5pm  2. Prescription(s) must be in pharmacy by 3pm to be filled same day  3. Copy of patient's insurance/ prescription drug card and patient face sheet must be sent along with the prescription(s)  4. Cost of Rx cannot be added to hospital bill. If financial assistance is needed, please contact unit  or ;  or  CANNOT provide pharmacy voucher for patients co-pays  5.  Patients can then  the prescription on their way out of the hospital at discharge, or pharmacy can deliver to the bedside if staff is available. (payment due at time of pick-up or delivery - cash, check, or card accepted)     Able to afford home medications/ co-pay costs: Yes    ADLS  Support Systems: Family Members    PT AM-PAC: 17 /24  OT AM-PAC: 15 /24    [place]: home with family  Steps: unknown    Plans to RETURN to current housing: No  Barriers to RETURNING to current housing: SNF referral    Home Care Information  Currently ACTIVE with [address]: No  Home Care Agency: Not Applicable    Currently ACTIVE with [facility] [facility]: No  Passport/ Waiver: No  Passport/ Waiver Services: Not Applicable    :  Phone:       [facility]  [facility] Provider: unknown  Equipment: unknown    Home Oxygen and Respiratory Equipment  Has HOME OXYGEN prior to admission: [address]: Not Applicable  Other Respiratory Equipment: n/a    Informed of need to bring portable home O2 tank on day of DISCHARGE for nursing to connect prior to leaving: No  Verbalized agreement/Understanding: No  Person to bring portable tank at discharge: n/a    Dialysis  Active with HD/PD prior to admission: No  Nephrologist: [address]  Not Applicable    DISCHARGE PLAN:  Disposition: tbd - likely SNF    Transportation PLAN for discharge: EMS transportation     Factors facilitating achievement of predicted outcomes: Family support, Motivated, Cooperative, and Pleasant    Barriers to discharge: UTI, repeat PCT today, podiatry following    Additional Case Management Notes: Patient is from home. Patient planning to discharge to a SNF - patient preference is [facility] - referral has been made and CM spoke with [place] who is reviewing the referral.    The Plan for Transition of Care is related to the following treatment goals of Pneumonia [J18.9]  Atypical pneumonia [J18.9]  Cellulitis of left lower extremity [L03.116]  Fall, initial encounter [W19. XXXA]    The Patient and/or patient representative Brianne Schwartz and her family were provided with a choice of provider and agrees with the discharge plan Yes    Freedom of choice list was provided with basic dialogue that supports the patient's individualized plan of care/goals and shares the quality data associated with the providers.  Yes    Care Transition patient: No    RAMONA Landis  [facility] [employer]  Case Management Department  Ph: [phone]   Fax: [phone]

## 2022-08-29 NOTE — CONSULTS
[facility]  Palliative Medicine Consultation Note      Date Of Admission:8/26/2022  Date of consult: 08/29/22  Seen by [facility] in the past:  No    Recommendations:        Met pt at the bedside and introduced palliative care. She has been in rehab for the last 5 weeks and then was home with her son for 1 day and had a fall. Her son said he was walking her with a gait belt, she became unsteady and he lowered her to the ground. She did not suffer any major injury, other than an abrasion to her leg, but he brought her to the ED to be evaluated. We discussed her steady decline since her initial fall 5 weeks ago. Despite this decline and her health status, Cristina Thomason and her son agree that she should remain a full code. Cristina Thomason says she feels pretty good today and has a good appetite. She would like to get out of bed, but says the chair is uncomfortable. Joy, pt's daughter and HCPOA, was called, but there was no answer. Voicemail was left. Plan is for Cristina Thomason to transfer to the [facility] in the coming days. 1. Goals of Care/Advanced Care planning/Code status: continue current medical mangement  2. Pain: pt denies pain at this time, but has intermittent pain in her BLE, PRN tramadol ordered  3. SOB: denies  4. LLE DVT: has edema with skin beakdown, podiatry consulted, on abx  5.  Disposition: D/C to the [facility] when medically appropriate    Reason for Consult:         [x]  Goals of Care  [x]  Code Status Discussion/Advanced Care Planning   []  Psychosocial/Family Support  []  Symptom Management  []  Other (Specify)    Requesting Physician: Dr. Adama Campos:  Fall    History Obtained From:  patient, son - [facility]    History of Present Illness:         Cesar Win is a 80 y.o. 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 She was admitted 7/14/2022 -7/20/2022 with fall and large scalp laceration requiring staples with urinary tract infection. In the ED, WBC 11.2, lactic 2.8, and CXR showed hazy bibasilar consolidations worse on the right. She was admitted and started on abx. Podiatry was consulted for wound on LLE. Palliative care was consulted for goals of care discussions. Subjective:         Past Medical History:        Diagnosis Date    Age-related macular degeneration, dry     Anemia B12 deficiency     Balance disorder     Cancer, skin, squamous cell 12/10    right lower leg x 2 - having these surgically removed. Chronic constipation     Colorectal polyps     Depression     Diverticulosis     Epistaxis     recurrent     Hyperlipidemia     Hypertension     Low magnesium levels     Neuropathy     peripheral - Dr. Belle Alex     Peripheral edema     Pruritis     Dr. Jose Daniel Xie    Recurrent UTI     Small vessel disease, cerebrovascular 3/2013    MRI    Tuberculosis     as a child    Type II or unspecified type diabetes mellitus without mention of complication, not stated as uncontrolled     Unspecified sleep apnea     CPAP - stopped using this in 2012    Urgency incontinence        Past Surgical History:        Procedure Laterality Date    CYSTOCELE REPAIR      HYSTERECTOMY (CERVIX STATUS UNKNOWN)      ovaries intact    RECTOCELE REPAIR      TOTAL KNEE ARTHROPLASTY         Current Medications:    Medications Prior to Admission: gabapentin (NEURONTIN) 400 MG capsule, Take 400 mg by mouth in the morning and at bedtime. apixaban (ELIQUIS) 5 MG TABS tablet, Take 5 mg by mouth 2 times daily  traMADol (ULTRAM) 50 MG tablet, Take 50 mg by mouth in the morning and at bedtime. [DISCONTINUED] aspirin EC 81 MG EC tablet, Take 1 tablet by mouth in the morning.   [DISCONTINUED] gabapentin (NEURONTIN) 400 MG capsule, Take 1 capsule by mouth every evening  [DISCONTINUED] guaiFENesin (MUCINEX) 600 MG extended release tablet, Take 1 tablet by mouth 2 times daily as needed for Congestion  Biotin 10 MG CAPS, Take 10 mg by mouth daily  Cyanocobalamin 5000 MCG TBDP, Place 5,000 mcg under the tongue daily  glucose blood VI test strips (ASCENSIA AUTODISC VI;ONE TOUCH ULTRA TEST VI) strip, 1 strip  metFORMIN (GLUCOPHAGE) 1000 MG tablet, Take 1,000 mg by mouth 2 times daily (with meals)  MAGNESIUM PO, Take 1 tablet by mouth daily   [DISCONTINUED] Omega-3 Fatty Acids (FISH OIL OMEGA-3 PO), Take 1 tablet by mouth daily  [DISCONTINUED] rosuvastatin (CRESTOR) 10 MG tablet, Take 10 mg by mouth    Allergies:  Lipitor, Cymbalta [duloxetine hcl], and Pregabalin    Social History:    TOBACCO: reports that she has never smoked. She has never used smokeless tobacco.  ETOH:   reports current alcohol use. Patient currently lives in a nursing home    Review of Systems -   Review of Systems   Constitutional:  Positive for fatigue. HENT: Negative. Eyes: Negative. Respiratory: Negative. Cardiovascular:  Positive for leg swelling. Gastrointestinal: Negative. Endocrine: Negative. Genitourinary: Negative. Musculoskeletal: Negative. Skin:  Positive for wound. Allergic/Immunologic: Negative. Neurological: Negative. Hematological: Negative. Psychiatric/Behavioral: Negative. Objective:        Physical Exam  Constitutional:       Appearance: Normal appearance. HENT:      Head: Normocephalic and atraumatic. Nose: Nose normal.      Mouth/Throat:      Mouth: Mucous membranes are moist.      Pharynx: Oropharynx is clear. Eyes:      Extraocular Movements: Extraocular movements intact. Pupils: Pupils are equal, round, and reactive to light. Cardiovascular:      Rate and Rhythm: Normal rate and regular rhythm. Pulses: Normal pulses. Heart sounds: Normal heart sounds. Pulmonary:      Effort: Pulmonary effort is normal.      Breath sounds: Normal breath sounds. Abdominal:      General: Abdomen is flat. Bowel sounds are normal.      Palpations: Abdomen is soft. Musculoskeletal:      Cervical back: Normal range of motion. Right lower leg: Edema present. Left lower leg: Edema present. Skin:     General: Skin is warm and dry. Findings: Lesion present. Neurological:      General: No focal deficit present. Mental Status: She is alert and oriented to person, place, and time. Mental status is at baseline. Palliative Performance Scale:  [] 60% Ambulation reduced; Significant disease; Can't do hobbies/housework; intake normal or reduced; occasional assist; LOC full/confusion  [x] 50% Mainly sit/lie; Extensive disease; Can't do any work; Considerable assist; intake normal  Or reduced; LOC full/confusion  [] 40% Mainly in bed; Extensive disease; Mainly assist; intake normal or reduced; occasional assist; LOC full/confusion  [] 30% Bed Bound; Extensive disease; Total care; intake reduced; LOC full/confusion  [] 20% Bed Bound; Extensive disease; Total care; intake minimal; Drowsy/coma  [] 10% Bed Bound; Extensive disease; Total care; Mouth care only; Drowsy/coma  [] 0% Death    PPS: 50%    Vitals:    /69   Pulse 72   Temp 98 °F (36.7 °C) (Oral)   Resp 16   Ht 5' 5\" (1.651 m)   Wt 127 lb 3.3 oz (57.7 kg)   SpO2 96%   BMI 21.17 kg/m²     Labs:    BMP:   Recent Labs     08/27/22  0602 08/28/22  0648 08/29/22  0551   * 134* 135*   K 4.4 4.2 4.0   CL 99 98* 99   CO2 26 29 30   BUN 7 9 9   CREATININE <0.5* <0.5* <0.5*   GLUCOSE 211* 202* 202*     CBC:   Recent Labs     08/27/22  0602 08/28/22  0648 08/29/22  0551   WBC 9.6 7.8 5.8   HGB 11.1* 10.7* 10.1*   HCT 31.1* 31.5* 30.3*    232 226       LFT's: No results for input(s): AST, ALT, ALB, BILITOT, ALKPHOS in the last 72 hours. Troponin:   Recent Labs     08/26/22  1748   TROPONINI <0.01     BNP: No results for input(s): BNP in the last 72 hours.   ABGs: No results for input(s): PHART, KXB4BFY, PO2ART in the last 72 hours. INR: No results for input(s): INR in the last 72 hours. U/A:  Recent Labs     08/26/22  2318   COLORU Yellow   PHUR 8.0   WBCUA 6-9*   RBCUA None seen   BACTERIA 2+*   CLARITYU Clear   SPECGRAV 1.015   LEUKOCYTESUR MODERATE*   UROBILINOGEN 0.2   BILIRUBINUR Negative   BLOODU Negative   GLUCOSEU 500*       CT ANKLE LEFT WO CONTRAST   Final Result      1. No acute osseous findings. 2.  Diffuse subcutaneous infiltration represent edema or cellulitis. CT TIBIA FIBULA LEFT WO CONTRAST   Final Result      1. No acute osseous findings. 2.  Diffuse subcutaneous infiltration represent edema or cellulitis. CT HEAD WO CONTRAST   Final Result      1. No intracranial hemorrhage   2. Stable severe chronic white matter disease      XR CHEST PORTABLE   Final Result      Hazy bibasilar consolidation, worse on the right. This may reflect atypical pneumonia, less likely asymmetric pulmonary edema. Normal cardiomediastinal silhouette. No visualized fracture. XR TIBIA FIBULA LEFT (2 VIEWS)   Final Result      Right knee: 2 views obtained. Lateral view is limited by oblique positioning. There is severe medial compartment osteoarthritis. There is lateral compartment chondrocalcinosis. Evaluation for knee effusion is technically limited. Left tibia/fibula: 2 views demonstrate a 5 mm well-circumscribed lytic lesion in the distal tibial diaphysis, of unclear etiology. In the absence of known primary neoplasm this is likely of no clinical significance. Otherwise no acute abnormality seen. Knee arthroplasty is standardly position. XR KNEE RIGHT (3 VIEWS)   Final Result      Right knee: 2 views obtained. Lateral view is limited by oblique positioning. There is severe medial compartment osteoarthritis. There is lateral compartment chondrocalcinosis. Evaluation for knee effusion is technically limited.       Left tibia/fibula: 2 views demonstrate a 5 mm well-circumscribed lytic lesion in the distal tibial diaphysis, of unclear etiology. In the absence of known primary neoplasm this is likely of no clinical significance. Otherwise no acute abnormality seen. Knee arthroplasty is standardly position. Conclusion/Time spent:         Recommendations see above    Time spent with patient and/or family: 30  Time reviewing records: 10 min   Time communicating with staff: 5 min     A total of  minutes spent with the patient and family on unit greater than 50% in counseling regarding palliative care and in goals of care for the patient. Thank you to Dr. Nabil Ledezma for this consultation. We will continue to follow Ms. [facility] care as needed.     EDILMA Alva NP

## 2022-08-29 NOTE — PLAN OF CARE
Problem: Skin/Tissue Integrity  Goal: Absence of new skin breakdown  Description: 1. Monitor for areas of redness and/or skin breakdown  2. Assess vascular access sites hourly  3. Every 4-6 hours minimum:  Change oxygen saturation probe site  4. Every 4-6 hours:  If on nasal continuous positive airway pressure, respiratory therapy assess nares and determine need for appliance change or resting period. 8/29/2022 0004 by Gina Esparza RN  Outcome: Progressing  Note: Specialty mattress in use, so s/s of new skin breakdown observed. Problem: Safety - Adult  Goal: Free from fall injury  8/29/2022 0004 by Gina Esparza RN  Outcome: Progressing  Note: No falls occurred this shift; pt utilizes call light appropriately for assistance.

## 2022-08-29 NOTE — PROGRESS NOTES
Occupational Therapy  Facility/Department: [facility] 5T ORTHO/NEURO  Occupational Therapy Initial Assessment/Treatment    Name: Favian Garcia  : 1925  MRN: [id number]  Date of Service: 2022    Discharge Recommendations: Favian Garcia scored a 15/24 on the AM-PAC ADL Inpatient form. Current research shows that an AM-PAC score of 17 or less is typically not associated with a discharge to the patient's home setting. Based on the patient's AM-PAC score and their current ADL deficits, it is recommended that the patient have 3-5 sessions per week of Occupational Therapy at d/c to increase the patient's independence. Please see assessment section for further patient specific details. If patient discharges prior to next session this note will serve as a discharge summary. Please see below for the latest assessment towards goals. OT Equipment Recommendations  Other: defer to next level of care- recommend 2WW       Patient Diagnosis(es): The primary encounter diagnosis was Cellulitis of left lower extremity. Diagnoses of Fall, initial encounter and Atypical pneumonia were also pertinent to this visit. Past Medical History:  has a past medical history of Age-related macular degeneration, dry, Anemia B12 deficiency, Balance disorder, Cancer, skin, squamous cell, Chronic constipation, Colorectal polyps, Depression, Diverticulosis, Epistaxis, Hyperlipidemia, Hypertension, Low magnesium levels, Neuropathy, Osteoarthritis, Peripheral edema, Pruritis, Recurrent UTI, Small vessel disease, cerebrovascular, Tuberculosis, Type II or unspecified type diabetes mellitus without mention of complication, not stated as uncontrolled, Unspecified sleep apnea, and Urgency incontinence. Past Surgical History:  has a past surgical history that includes Cystocele repair; Rectocele repair; Total knee arthroplasty; and Hysterectomy.     Treatment Diagnosis: decreased independence with fx mobility and ADLs, signficant history of falls      Assessment   Performance deficits / Impairments: Decreased functional mobility ; Decreased ADL status; Decreased strength;Decreased safe awareness;Decreased balance  Assessment: Pt is 81 yo female just recently admitted to [facility] for fall (7/14-7/20) and readmitted to [facility] (8/26) for PNA. Pt is from home with son and uses rollator at baseline. Pt has signficant history of falls (~20 in past year). Pt required min A for all fx transfers and mobility this date with 1 LOB using 2WW. Pt required flucuating levels of A for ADLs requiring increased A for LB ADLs in particular. Pt would benefit from ongoing inpatient therapy services at baseline to improve balance, strength, safety, and independence. Will cont to follow on acute OT POC. Treatment Diagnosis: decreased independence with fx mobility and ADLs, signficant history of falls  Prognosis: Good  Decision Making: Medium Complexity  REQUIRES OT FOLLOW-UP: Yes  Activity Tolerance  Activity Tolerance: Patient Tolerated treatment well;Patient limited by pain  Activity Tolerance Comments: spO2 94% ; pt with pain in BLE during fx mobility but pt still participated with eval        Plan   Plan  Times per Week: 2-5x  Times per Day: Daily  Current Treatment Recommendations: Functional mobility training, Self-Care / ADL, Safety education & training, Endurance training, Balance training, Strengthening     Restrictions  Position Activity Restriction  Other position/activity restrictions: full weight bearing; up with asssitance    Subjective   General  Chart Reviewed: Yes  Patient assessed for rehabilitation services?: Yes  Additional Pertinent Hx: 80 y.o. 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  Upon concurrent ED admission: abnormal CXR, with question of pneumonia and Left leg also had some erythema and swelling, possibly sec to trauma, recent DVT. (Had Left LE DVT at facility, and was placed on [address] about 10 days PTA). Referring Practitioner: Sean Christiansen MD  Diagnosis: PNA  Subjective  Subjective: Pt lying supine in bed upon OT arrival and agreeable to OT evaluation. Pt stating, \"I can put on my shoes without help but my son is always there to help me if I need it\"  8/10 pain with mobility with BLE; RN aware  Social/Functional History  Social/Functional History  Lives With: Son  Type of Home: Condo  Home Layout: Two level, Able to Live on Main level with bedroom/bathroom  Home Access: Stairs to enter without rails  Entrance Stairs - Number of Steps: 2  Bathroom Shower/Tub: Walk-in shower, Shower chair with back  [employer]: Standard  Bathroom Equipment: Hand-held shower, Grab bars in shower, Grab bars around toilet  Bathroom Accessibility: Accessible  Home Equipment: Elnoria Rob, 4 wheeled (gait belt)  Has the patient had two or more falls in the past year or any fall with injury in the past year?: Yes (about 20, pt notes she has come to the hospital for previous falls and patient notes she has been using her rollator when she falls)  Receives Help From: Family (son is home with patient all the time)  ADL Assistance: Independent  Homemaking Assistance: Needs assistance (Pt son completes all cooking, cleaning and laundry)  Homemaking Responsibilities: No  Ambulation Assistance: Independent  Transfer Assistance: Independent  Active : No  Patient's  Info: son drives       Objective   Heart Rate: 72  Heart Rate Source: Monitor  BP: 111/69  BP Location: Right upper arm  BP Method: Automatic  Patient Position: Up in chair  MAP (Calculated): 83  Resp: 16  SpO2: 96 %  O2 Device: None (Room air)          Observation/Palpation  Observation: B LE ace wrapped (Simultaneous filing. User may not have seen previous data.)  Safety Devices  Type of Devices:  All fall risk precautions in place;Call light within reach; Chair alarm in place;Gait belt;Patient at risk for falls; Left in chair;Nurse notified (Simultaneous filing. User may not have seen previous data.)  Restraints  Restraints Initially in Place: No  Bed Mobility Training  Bed Mobility Training: Yes  Overall Level of Assistance: Stand-by assistance; Additional time  Supine to Sit: Additional time;Stand-by assistance  Sit to Supine:  (left in chair end of sessoin)  Balance  Sitting: Intact (pt sat on bedside commode during sponge bath without back supported needed)  Standing: With support (pt had one LOB in static stance and needed mod A to correct)  Standing - Static: Poor (posterior lean)  Transfer Training  Transfer Training: Yes  Overall Level of Assistance: Additional time;Minimum assistance  Sit to Stand: Minimum assistance;Contact-guard assistance  Stand to Sit: Minimum assistance;Contact-guard assistance  Toilet Transfer: Minimum assistance; Additional time  Gait Training  Gait Training: Yes  Right Side Weight Bearing: As tolerated  Left Side Weight Bearing: As tolerated  Gait  Overall Level of Assistance: Minimum assistance; Additional time  Base of Support: Widened  Speed/Katja: Slow  Step Length: Left shortened;Right shortened  Stance: Left increased;Right increased  Gait Abnormalities: Antalgic (decreased heel/ toe gait pattern, decreased step height)  Distance (ft): 10 Feet (10' + 20')  Assistive Device: Walker, rolling     AROM: Within functional limits  PROM: Within functional limits  Strength: Generally decreased, functional (age related weakness)  Coordination: Within functional limits  Tone: Normal  Sensation: Impaired (neuropathy in BLE)  ADL  Feeding: Setup  Feeding Skilled Clinical Factors: water bottle from home  Grooming: Setup  Grooming Skilled Clinical Factors: pt able to brush hair, wash face with warm wash cloth, and brush teeth with setup of items  UE Bathing: Minimal assistance  UE Bathing Skilled Clinical Factors: Pt used bath wipes to wash arms, under breasts, and armpits. Therapist assisted with washing patients back. Therapist used shower cap to clean patients hair  LE Bathing: Maximum assistance  LE Bathing Skilled Clinical Factors: Pt able to assist with washing off upper thighs; therapist washed carmelina area and buttocks while pt stood  UE Dressing: Minimal assistance  UE Dressing Skilled Clinical Factors: to exchange gowns  LE Dressing: Maximum assistance  LE Dressing Skilled Clinical Factors: to don socks past ACE bandages and don new brief  Toileting: Maximum assistance  Toileting Skilled Clinical Factors: pt unable to void while on commode- pt needed max A for brief assistance; pt using a purwick 2/2 pt reporting \"I know when I have to go but I don't have enough time to always get there\"     Activity Tolerance  Activity Tolerance: Patient tolerated treatment well;Patient limited by endurance; Patient limited by pain  Activity Tolerance Comments: Pt agreeable to everything throughout session but requires increased time to complete all transfers and ambulation. Vision  Vision: Impaired  Vision Exceptions: Wears glasses at all times  Hearing  Hearing: Exceptions to [facility]  Hearing Exceptions: Left hearing aid;Right hearing aid  Cognition  Overall Cognitive Status: [facility]  Arousal/Alertness: Appropriate responses to stimuli  Following Commands:  Follows one step commands consistently  Attention Span: Appears intact  Memory: Appears intact  Safety Judgement: Decreased awareness of need for safety  Insights: Decreased awareness of deficits  Initiation: Requires cues for some  Sequencing: Requires cues for some  Cognition Comment: Pt has fallen numerous times in home setting, would benefit from continued safety education  Orientation  Overall Orientation Status: Within Functional Limits  Orientation Level: Oriented X4                  Education Given To: Patient  Education Provided: Role of Therapy;Plan of Care;Transfer Training  Education Provided Comments: continue educating on hand placement with transfers and use of 2WW vs Rollator  Education Method: Demonstration;Verbal  Barriers to Learning: Hearing;Cognition  Education Outcome: Verbalized understanding;Continued education needed                        G-Code     OutComes Score                                                  AM-PAC Score        AM-PAC Inpatient Daily Activity Raw Score: 15 (08/29/22 1121)  AM-PAC Inpatient ADL T-Scale Score : 34.69 (08/29/22 1121)  ADL Inpatient CMS 0-100% Score: 56.46 (08/29/22 1121)  ADL Inpatient CMS G-Code Modifier : CK (08/29/22 1121)    Tinneti Score       Goals  Short Term Goals  Time Frame for Short term goals: by discharge  Short Term Goal 1: Pt will perform fx mobility to/from bathroom for ADLs with SBA and no LOB  Short Term Goal 2: Pt will perform LB dressing with min A  Short Term Goal 3: Pt will perform static standing balance with SBA for 3 min w/ no LOB evidenced  Patient Goals   Patient goals : not stated       Therapy Time   Individual Concurrent Group Co-treatment   Time In 1004         Time Out 1049         Minutes 45         Timed Code Treatment Minutes: 30 Minutes (+ 15 min OT eval)       Rosa Maria Primes, OT

## 2022-08-29 NOTE — DISCHARGE INSTRUCTIONS
Podiatry Wound Care Discharge Instructions  Please perform every other day dressing changes to bilateral lower extremity as follows  -Apply a non-adherent dressing over the laceration to the wound on the  right lower leg  -Next apply gauze to the top of the b/l foot, ankle, and leg  -Next loosely wrap the bilateral lower extremity with Kerlix starting from just in front of the toes and ending just below the knee  -Next gently wrap the bilateral foot with 4 inch Ace bandage starting from just in front of the toes and ending just above the ankle  -Next gently wrap the bilateral leg with 6 inch Ace bandage starting from just above the ankle and ending just below the knee  Patient is  weightbearing Bilateral lower extremity as tolerated and with assistance  Please follow-up with Dr. Farnaz Hoover DPM for wound care within 1 week of discharge.      For any questions, please contact Dr. Zbigniew Sheth  [address]

## 2022-08-29 NOTE — PROGRESS NOTES
Podiatric Surgery Daily Progress Note  Bibiana Mcleod      Subjective :   Patient seen and examined this am at the bedside. Patient denies any acute overnight events. Patient denies N/V/F/C/SOB. Patient denies calf pain, thigh pain, chest pain. Review of Systems: A 12 point review of symptoms is unremarkable with the exception of the chief complaint. Patient specifically denies nausea, fever, vomiting, chills, shortness of breath, chest pain, abdominal pain, constipation or difficulty urinating. Objective     /73   Pulse 72   Temp 98.2 °F (36.8 °C) (Axillary)   Resp 18   Ht 5' 5\" (1.651 m)   Wt 127 lb 9 oz (57.9 kg)   SpO2 95%   BMI 21.23 kg/m²      I/O:  Intake/Output Summary (Last 24 hours) at 8/29/2022 0413  Last data filed at 8/28/2022 2123  Gross per 24 hour   Intake 966.83 ml   Output 1100 ml   Net -133.17 ml              Wt Readings from Last 3 Encounters:   08/27/22 127 lb 9 oz (57.9 kg)   07/20/22 115 lb 11.9 oz (52.5 kg)   12/18/17 111 lb 5.3 oz (50.5 kg)       LABS:    Recent Labs     08/27/22  0602 08/28/22  0648   WBC 9.6 7.8   HGB 11.1* 10.7*   HCT 31.1* 31.5*    232        Recent Labs     08/28/22  0648   *   K 4.2   CL 98*   CO2 29   PHOS 3.3   BUN 9   CREATININE <0.5*      No results for input(s): PROT, INR, APTT in the last 72 hours. LOWER EXTREMITY EXAMINATION    Dressing to b/l LE intact. No strikethrough noted to the external dressing. Serous drainage noted to the internal layers of the dressing on the left lower extremity. No drainage noted to the internal layers of the right lower extremity. VASCULAR: DP and PT pulses non-palpable. Upon handheld doppler examination the DP/PT were biphasic b/l. CFT is brisk to the digits of the foot b/l. Skin temperature is warm to cool from proximal to distal with no focal calor noted on the right lower extremity and warm to warm on left lower extremity with generalized calor to the entire leg.   No edema standardly position. CT left Lower extremity:  Narrative   CT TIBIA FIBULA LEFT WO CONTRAST, CT ANKLE LEFT WO CONTRAST       INDICATION: Fall. Left lower extremities pain. COMPARISON: None. TECHNIQUE: CT of the left tibia and fibula and CT of the left ankle were obtained without IV contrast. The axial data sets were reformatted in the coronal and sagittal planes. Up-to-date CT equipment and radiation dose reduction techniques were employed. FINDINGS:        Left total knee arthroplasty in place. Streak artifact from hardware. No significant abnormality identified. No fracture or malalignment in the tibia or fibula. No fracture or malalignment in the left ankle. Severe diffuse swelling and subcutaneous infiltration throughout the lower extremity. No soft tissue gas. No drainable fluid collection. Impression       1. No acute osseous findings. 2.  Diffuse subcutaneous infiltration represent edema or cellulitis. IMPRESSION/RECOMMENDATIONS:    - Edema left lower extremity secondary to fall; less likely a cellulitic process- significant improvement in edema and tenderness  - Laceration right lower extremity  - Recent history of DVT left lower extremity  - Diabetes type II  - History of hypertension  - history of Hyperlipidemia      -Patient examined and evaluated at the bedside   -VSS.   No Leukocytosis noted (5.8)  -ESR 9 CRP 11.1  -Imaging reviewed, impressions noted above  -Steri-Strips is applied by the ED were left in place, dressing was applied to the right lower extremity consisting of Adaptic, gauze, Kerlix, Ace.  -Dressing applied to the left lower extremity consisting of gauze, Kerlix, Ace.  -Dressing to remain clean dry and intact  -Elevate lower extremity b/l  -Continue with Eliquis at this time due to recent history of DVT left lower extremity.  -Antibiotics: azithromycin, ceftriaxone   -Weightbearing as tolerated and up with assistance; patient a fall risk.  - Fall precautions     DISPO: Laceration right lower extremity; Edema of the left lower extremity secondary to fall, with significant improvement with compression. History of DVT left lower extremity. Continue antibiotics per primary team.   Patient okay to discharge from podiatric standpoint pending medical clearance and outpatient antibiotic recommendations. Will  need outpatient wound care following discharge. We will continue to monitor while in-house. The assessment and plan will be discussed with JAVIER Headley DPM   Podiatric Resident PGY1  Pager [phone] or Didi  8/29/2022, 6:08 AM     Patient was seen and evaluated at bedside. Agree with residents assessment and treatment plan.   Asaf Gongora DPM

## 2022-08-30 LAB
ALBUMIN SERPL-MCNC: 3 G/DL (ref 3.4–5)
ANION GAP SERPL CALCULATED.3IONS-SCNC: 8 MMOL/L (ref 3–16)
BASOPHILS ABSOLUTE: 0 K/UL (ref 0–0.2)
BASOPHILS RELATIVE PERCENT: 0.7 %
BLOOD CULTURE, ROUTINE: NORMAL
BUN BLDV-MCNC: 9 MG/DL (ref 7–20)
CALCIUM SERPL-MCNC: 9 MG/DL (ref 8.3–10.6)
CHLORIDE BLD-SCNC: 101 MMOL/L (ref 99–110)
CO2: 29 MMOL/L (ref 21–32)
CREAT SERPL-MCNC: 0.5 MG/DL (ref 0.6–1.2)
CULTURE, BLOOD 2: NORMAL
EOSINOPHILS ABSOLUTE: 0.2 K/UL (ref 0–0.6)
EOSINOPHILS RELATIVE PERCENT: 3.5 %
GFR AFRICAN AMERICAN: >60
GFR NON-AFRICAN AMERICAN: >60
GLUCOSE BLD-MCNC: 150 MG/DL (ref 70–99)
GLUCOSE BLD-MCNC: 223 MG/DL (ref 70–99)
GLUCOSE BLD-MCNC: 241 MG/DL (ref 70–99)
GLUCOSE BLD-MCNC: 252 MG/DL (ref 70–99)
GLUCOSE BLD-MCNC: 266 MG/DL (ref 70–99)
GLUCOSE BLD-MCNC: 275 MG/DL (ref 70–99)
HCT VFR BLD CALC: 30.5 % (ref 36–48)
HEMOGLOBIN: 10.2 G/DL (ref 12–16)
LYMPHOCYTES ABSOLUTE: 1.1 K/UL (ref 1–5.1)
LYMPHOCYTES RELATIVE PERCENT: 20.3 %
MCH RBC QN AUTO: 31 PG (ref 26–34)
MCHC RBC AUTO-ENTMCNC: 33.4 G/DL (ref 31–36)
MCV RBC AUTO: 92.8 FL (ref 80–100)
MONOCYTES ABSOLUTE: 0.6 K/UL (ref 0–1.3)
MONOCYTES RELATIVE PERCENT: 11.3 %
NEUTROPHILS ABSOLUTE: 3.5 K/UL (ref 1.7–7.7)
NEUTROPHILS RELATIVE PERCENT: 64.2 %
ORGANISM: ABNORMAL
PDW BLD-RTO: 12.8 % (ref 12.4–15.4)
PERFORMED ON: ABNORMAL
PHOSPHORUS: 3.4 MG/DL (ref 2.5–4.9)
PLATELET # BLD: 261 K/UL (ref 135–450)
PMV BLD AUTO: 7 FL (ref 5–10.5)
POTASSIUM SERPL-SCNC: 4.1 MMOL/L (ref 3.5–5.1)
RBC # BLD: 3.29 M/UL (ref 4–5.2)
SODIUM BLD-SCNC: 138 MMOL/L (ref 136–145)
URINE CULTURE, ROUTINE: ABNORMAL
WBC # BLD: 5.4 K/UL (ref 4–11)

## 2022-08-30 PROCEDURE — 6360000002 HC RX W HCPCS: Performed by: INTERNAL MEDICINE

## 2022-08-30 PROCEDURE — 1200000000 HC SEMI PRIVATE

## 2022-08-30 PROCEDURE — 36415 COLL VENOUS BLD VENIPUNCTURE: CPT

## 2022-08-30 PROCEDURE — 6370000000 HC RX 637 (ALT 250 FOR IP): Performed by: INTERNAL MEDICINE

## 2022-08-30 PROCEDURE — 85025 COMPLETE CBC W/AUTO DIFF WBC: CPT

## 2022-08-30 PROCEDURE — 97530 THERAPEUTIC ACTIVITIES: CPT

## 2022-08-30 PROCEDURE — 2580000003 HC RX 258: Performed by: INTERNAL MEDICINE

## 2022-08-30 PROCEDURE — 80069 RENAL FUNCTION PANEL: CPT

## 2022-08-30 PROCEDURE — 97116 GAIT TRAINING THERAPY: CPT

## 2022-08-30 RX ADMIN — INSULIN LISPRO 4 UNITS: 100 INJECTION, SOLUTION INTRAVENOUS; SUBCUTANEOUS at 11:53

## 2022-08-30 RX ADMIN — Medication: at 08:52

## 2022-08-30 RX ADMIN — INSULIN GLARGINE 10 UNITS: 100 INJECTION, SOLUTION SUBCUTANEOUS at 20:47

## 2022-08-30 RX ADMIN — CEFTRIAXONE 1000 MG: 1 INJECTION, POWDER, FOR SOLUTION INTRAMUSCULAR; INTRAVENOUS at 21:10

## 2022-08-30 RX ADMIN — SODIUM CHLORIDE, PRESERVATIVE FREE 10 ML: 5 INJECTION INTRAVENOUS at 20:50

## 2022-08-30 RX ADMIN — GABAPENTIN 400 MG: 400 CAPSULE ORAL at 20:46

## 2022-08-30 RX ADMIN — INSULIN LISPRO 4 UNITS: 100 INJECTION, SOLUTION INTRAVENOUS; SUBCUTANEOUS at 17:40

## 2022-08-30 RX ADMIN — AZITHROMYCIN DIHYDRATE 500 MG: 500 INJECTION, POWDER, LYOPHILIZED, FOR SOLUTION INTRAVENOUS at 19:35

## 2022-08-30 RX ADMIN — FUROSEMIDE 20 MG: 20 TABLET ORAL at 08:52

## 2022-08-30 RX ADMIN — Medication: at 20:47

## 2022-08-30 RX ADMIN — INSULIN GLARGINE 10 UNITS: 100 INJECTION, SOLUTION SUBCUTANEOUS at 23:45

## 2022-08-30 RX ADMIN — SODIUM CHLORIDE, PRESERVATIVE FREE 10 ML: 5 INJECTION INTRAVENOUS at 08:53

## 2022-08-30 RX ADMIN — TRAMADOL HYDROCHLORIDE 50 MG: 50 TABLET, COATED ORAL at 04:23

## 2022-08-30 RX ADMIN — GABAPENTIN 400 MG: 400 CAPSULE ORAL at 08:52

## 2022-08-30 RX ADMIN — Medication 1 CAPSULE: at 08:52

## 2022-08-30 RX ADMIN — APIXABAN 5 MG: 5 TABLET, FILM COATED ORAL at 08:52

## 2022-08-30 RX ADMIN — APIXABAN 5 MG: 5 TABLET, FILM COATED ORAL at 20:46

## 2022-08-30 ASSESSMENT — PAIN DESCRIPTION - DESCRIPTORS: DESCRIPTORS: ACHING

## 2022-08-30 ASSESSMENT — PAIN - FUNCTIONAL ASSESSMENT: PAIN_FUNCTIONAL_ASSESSMENT: PREVENTS OR INTERFERES SOME ACTIVE ACTIVITIES AND ADLS

## 2022-08-30 ASSESSMENT — PAIN DESCRIPTION - LOCATION: LOCATION: BACK

## 2022-08-30 ASSESSMENT — PAIN DESCRIPTION - ORIENTATION: ORIENTATION: MID

## 2022-08-30 ASSESSMENT — PAIN SCALES - GENERAL: PAINLEVEL_OUTOF10: 9

## 2022-08-30 NOTE — PLAN OF CARE
Problem: Discharge Planning  Goal: Discharge to home or other facility with appropriate resources  8/29/2022 2153 by Suzzanna Phalen, RN  Outcome: Progressing    Problem: Safety - Adult  Goal: Free from fall injury  8/29/2022 2153 by Suzzanna Phalen, RN  Outcome: Progressing    Problem: Skin/Tissue Integrity  Goal: Absence of new skin breakdown  Description: 1. Monitor for areas of redness and/or skin breakdown  2. Assess vascular access sites hourly  3. Every 4-6 hours minimum:  Change oxygen saturation probe site  4. Every 4-6 hours:  If on nasal continuous positive airway pressure, respiratory therapy assess nares and determine need for appliance change or resting period.   8/29/2022 2153 by Suzzanna Phalen, RN  Outcome: Progressing

## 2022-08-30 NOTE — PROGRESS NOTES
Pt a/o x3-4. VSS on room air. Denies pain. Dressings are clean, dry, and intact. Voiding adequately via purewick. Fall precautions are in place.

## 2022-08-30 NOTE — PROGRESS NOTES
Physical Therapy  Facility/Department: [facility] 5T ORTHO/NEURO  Daily Treatment Note  NAME: Patti Mayes  : 1925  MRN: [id number]    Date of Service: 2022    Discharge Recommendations: Patti Mayes scored a 17/24 on the AM-PAC short mobility form. Current research shows that an AM-PAC score of 17 or less is typically not associated with a discharge to the patient's home setting. Based on the patient's AM-PAC score and their current functional mobility deficits, it is recommended that the patient have 3-5 sessions per week of Physical Therapy at d/c to increase the patient's independence. Please see assessment section for further patient specific details. If patient discharges prior to next session this note will serve as a discharge summary. Please see below for the latest assessment towards goals. PT Equipment Recommendations  Equipment Needed: Yes  Walker: Rolling  Other: defer to next level, if pt d/c home recommend rolling walker    Patient Diagnosis(es): The primary encounter diagnosis was Cellulitis of left lower extremity. Diagnoses of Fall, initial encounter and Atypical pneumonia were also pertinent to this visit. Assessment   Assessment: pt tolerated session well motivated to participate. pt performing mobility with CGA- min A with RW requiring frequent rest breaks due to fatigue but was able to ambulate increasing distances on this date. pt with hx of multiple falls and continues to be at risk for future falls. pt would benefit from further IP PT upon dc to maximize independence. Continue POC  Activity Tolerance: Patient tolerated treatment well;Patient limited by endurance; Patient limited by pain  Equipment Needed: Yes  Other: defer to next level, if pt d/c home recommend rolling walker     Plan    Plan  Plan:  (2-5 x/wk)  Current Treatment Recommendations: Strengthening;Balance training;Functional mobility training;Transfer training; Endurance training;Gait training;Stair training;Neuromuscular re-education; Therapeutic activities     Restrictions  Position Activity Restriction  Other position/activity restrictions: full weight bearing; up with asssitance     Subjective    Subjective  Subjective: pt up in chair and agreeable to PT. \"I would love to work with you\"  Pain: no pain reported  Orientation  Overall Orientation Status: Within Functional Limits  Orientation Level: Oriented X4  Cognition  Overall Cognitive Status: WFL  Arousal/Alertness: Appropriate responses to stimuli  Following Commands: Follows one step commands consistently  Attention Span: Appears intact  Memory: Appears intact  Safety Judgement: Decreased awareness of need for safety  Insights: Decreased awareness of deficits  Initiation: Requires cues for some  Sequencing: Requires cues for some  Cognition Comment: Pt has fallen numerous times in home setting, would benefit from continued safety education     Objective   Vitals     Bed Mobility Training  Bed Mobility Training: No  Balance  Sitting: Intact  Standing: Impaired  Standing - Static: Fair;Occasional (CGA at RW)  Standing - Dynamic: Fair;Occasional (min A at [employer] for attempts at managing brief)  Transfer Training  Transfer Training: Yes  Overall Level of Assistance: Additional time;Minimum assistance  Sit to Stand: Minimum assistance (from recliner x2 and toilet at [employer])  Stand to Sit: Minimum assistance (assist for controlled descent)  Toilet Transfer: Minimum assistance; Additional time (at [employer])  Gait Training  Gait Training: Yes  Right Side Weight Bearing: As tolerated  Left Side Weight Bearing: As tolerated  Gait  Overall Level of Assistance: Contact-guard assistance; Additional time  Interventions: Verbal cues; Safety awareness training (for keeping RW within STEPHANIE)  Base of Support: Widened  Speed/Katja: Slow;Shuffled  Step Length: Left shortened;Right shortened  Gait Abnormalities: Antalgic  Distance (ft): 15 Feet (+ 15' + 50')  Assistive Device: Walker, rolling        Other Specialty Interventions  Other Treatments/Modalities: pt requesting to use restroom, requiring min A for toilet transfers, dependence for brief management and inc time to complete  Safety Devices  Type of Devices: All fall risk precautions in place;Call light within reach; Chair alarm in place;Gait belt;Patient at risk for falls; Left in chair;Nurse notified  Restraints  Restraints Initially in Place: No       Goals  Short Term Goals  Time Frame for Short term goals: by discharge  Short term goal 1: Pt to complete bed mobility Ind  Short term goal 2: Pt to complete functional transfers Ind  Short term goal 3: Pt to ambulate 48' Ind with LRAD  Short term goal 4: Pt to ascend/ descend 2 steps without HR Ind  Patient Goals   Patient goals : to return home    Education  Patient Education  Education Given To: Patient  Education Provided: Role of Therapy;Plan of Care  Education Method: Verbal  Barriers to Learning: Hearing  Education Outcome: Verbalized understanding    Therapy Time   Individual Concurrent Group Co-treatment   Time In 1345         Time Out 1410         Minutes 25                 Jalyn Silvestre PT

## 2022-08-30 NOTE — PROGRESS NOTES
Progress Note    Admit Date: 8/26/2022    Patient's PCP: Dr. Lesa Nyhan, MD      Chief complaint: Fall      HISTORY OF PRESENT ILLNESS:    This is a very pleasant 80 y.o. 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 She was walking to the restroom with her walker, and lost balance and fell. She had been feeling very weak. She was admitted   7/14/2022 -7/20/2022 with fall and large scalp laceration requiring staples with urinary tract infection, and was treated with ceftriaxone,  discharged on cefdinir. She was at rehab where she was not moving around much as per son, and she was found with a DVT 10 days PTA, and placed on eliquis. She was discharged from [facility] to home day PTA. In the ED, she was afebrile, and had fairly normal vitals. Lab work showed WBC of 11.2 with neutrophilia of 9.8, hemoglobin 11.9. Chemistries were fairly normal, lactic acid was marginally elevated at 2.8. Blood glucose 239.  29 less than 0.01    EKG showed no acute ST or T wave change    Head CT showed no acute process. Chest x-ray showed \". ..hazy bibasilar consolidations worse on the right. \"    She was given  750 mL of fluid and azithromycin, ceftriaxone, and vancomycin. Interval HPI  No new complaints  No chest pain  No fever    No dyspnea    LE edema improving    Legs wrapped per podiatry          Medications: Reviewed      Allergies:  Lipitor, Cymbalta [duloxetine hcl], and Pregabalin        ROS: Review of Systems - Negative except as in HPI. All other systems reviewed and are negative.     PHYSICAL EXAM:  /72   Pulse 79   Temp 97.7 °F (36.5 °C) (Oral)   Resp 16   Ht 5' 5\" (1.651 m)   Wt 128 lb 12 oz (58.4 kg)   SpO2 96%   BMI 21.42 kg/m²     Recent Labs     08/29/22  1552 08/29/22  2027 08/30/22  0720 08/30/22  1140 08/30/22  1647   POCGLU 222* 265* 150* 252* 266*       General appearance: alert, appears stated age, and cooperative  Head: Normocephalic, atraumatic  Neck: no adenopathy, no carotid bruit, no JVD  Lungs: clear to auscultation bilaterally  Heart: regular rate and rhythm, S1, S2 normal, no murmur  Abdomen: soft, non-tender; bowel sounds normal; no masses  Extremities: Dressing on bilateral LEs   Pulses: 2+ and symmetric      LABS:  Recent Labs     08/28/22  0648 08/29/22  0551 08/30/22  0832   WBC 7.8 5.8 5.4   HGB 10.7* 10.1* 10.2*   HCT 31.5* 30.3* 30.5*    226 261                                                                  Recent Labs     08/28/22  0648 08/29/22  0551 08/30/22  0832   * 135* 138   K 4.2 4.0 4.1   CL 98* 99 101   CO2 29 30 29   BUN 9 9 9   CREATININE <0.5* <0.5* 0.5*   GLUCOSE 202* 202* 223*     No results for input(s): AST, ALT, ALB, BILITOT, ALKPHOS in the last 72 hours. No results for input(s): TROPONINI in the last 72 hours. No results for input(s): BNP in the last 72 hours. No results found for: PHART, VFZ9BCV, PO2ART  No results for input(s): INR in the last 72 hours. No results for input(s): NITRITE, COLORU, PHUR, LABCAST, WBCUA, RBCUA, MUCUS, TRICHOMONAS, YEAST, BACTERIA, CLARITYU, SPECGRAV, LEUKOCYTESUR, UROBILINOGEN, BILIRUBINUR, BLOODU, GLUCOSEU, AMORPHOUS in the last 72 hours. Invalid input(s): Dimple Fawn     Echocardiogram July 2022   Summary   Left ventricular cavity size is normal. There is mildly increased left   ventricular wall thickness. Ejection fraction is visually estimated to be   55-60%. No regional wall motion abnormalities are noted. Grade II diastolic   dysfunction with elevated LV filling pressures. Mitral annular calcification is present. Thickened mitral valve leaflets. Moderate to severe mitral regurgitation. The left atrium is moderately dilated. Normal right ventricular size and function.          Assessment & Plan:    80 y.o. 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 Probable complicated UTI: POA  Generalized weakness  Recurrent falls  Possible Left LE cellulitis: POA  Recent Left LE DVT  Laceration right lower extremity: POA  Abnormal chest x-ray  Lactic acidosis  Diabetes mellitus type 2; complicated by peripheral neuropathy  Chronic diastolic heart failure      Patient presented with generalized weakness and fall    Was admitted last month with fall resulting in head laceration requiring staples. Urinalysis done and was positive. Appears urine cx was negative, and this was felt due to having been on nitrofurantoin PTA. Was treated empirically with ceftriaxone and discharged on cefdinir. Discharged to SNF; from SNF to Home just Day PTA. Re-presents to ED following another fall    Appears sustained right LE laceration following the fall. On presentation,  urine showed features of UTI. She did admit to urinary frequency and dysuria. Ordered UA:  showed features of infection, but did not reflex culture as WBC was less than 10. Urine culture ordered. ? Not done. Re-order: To be interpreted with knowledge patient already received antibiotic about 1-2 days  - Bladder scans ordered with recurrent UTIs: re-ordered and discussed with RN. She had abnormal CXR, with question of pneumonia and ED also felt she had left leg cellulitis. Has h/e not had respiratory s/s. Left leg also had some erythema and swelling, possibly sec to trauma, recent DVT. (Had Left LE DVT at facility, and was placed on [address] about 10 days PTA). Was placed in ED on IV vancomycin as well as ceftriaxone and azithromycin for possible lower extremity cellulitis and possible atypical pneumonia.     - Checked PCT:  was 0.12.   - Will Update CXR, and repeat PCT 8/29/2022    - Consulted Podiatry for extensive LE edema with skin breakdown. Appreciate input: Left lower extremity CT ordered: Diffuse subcutaneous infiltration represent edema or cellulitis. Local wound care. - Her fall is likely sec to acute illness, infection: UTI    - She has lactic acidosis, but does not seem to have features of sepsis at this time. She is on metformin. With her hx of heart failure, no additional fluids, except she develops SIRS features. - She has chronic diastolic Heart failure. Recent echocardiogram in July reviewed; appears to show Grade II diastolic dysfunction with elevated LV filling pressures; Mod- severe MR and Mod LA dilatation. Will place on small dose lasix. Monitor I/o. Monitor vitals and labs. PT OT eval      The patient and / or the family were informed of the results of any tests, a time was given to answer questions, a plan was proposed and they agreed with plan.     Full Code      Disposition: Possible discharge tomorrow, sensitivities pending     Pastor Keller MD

## 2022-08-30 NOTE — CARE COORDINATION
ADDENDUM: Pt is not able to DC today - waiting on labs, SW cancelled transport. Electronically signed by RAMONA Hernández LSW on 8/30/2022 at 4:30 PM  .    SW met w/Pt and Pt's daughter at bedside. She is going to [facility] at 2;30 and will be back after. SW informed her of poss DC and transport is scheduled at [address] via [facility] ([facility]). Electronically signed by RAMONA Hernández LSW on 8/30/2022 at 3:22 PM      SW spoke to [facility], they are reviewing referral now and will call W back. Patient is from home. Patient planning to discharge to a SNF - patient preference is [facility]. SW following.   Electronically signed by RAMONA Hernández LSW on 8/30/2022 at 11:25 AM  [phone]

## 2022-08-30 NOTE — PLAN OF CARE
Problem: Skin/Tissue Integrity  Goal: Absence of new skin breakdown  Description: 1. Monitor for areas of redness and/or skin breakdown  2. Assess vascular access sites hourly  3. Every 4-6 hours minimum:  Change oxygen saturation probe site  4. Every 4-6 hours:  If on nasal continuous positive airway pressure, respiratory therapy assess nares and determine need for appliance change or resting period. Outcome: Progressing  Note: Pt has blanchable redness to coccyx. Venelex cream applied. Pt in specialty mattress. Turning pt and pt up to chair. Problem: Safety - Adult  Goal: Free from fall injury  Outcome: Progressing  Note: Pt free from falls/ injury during duration of shift. Fall risk precautions in place.

## 2022-08-30 NOTE — PROGRESS NOTES
Pt a/o x4. VSS. Denies N/V. No complaints of pain at this time. Pt ambulating x2 assist w/ walker. Ace wraps to BLE CDI. +2 pitting edema to BLE. Will continue to monitor patient.      Rios Wu RN

## 2022-08-30 NOTE — PROGRESS NOTES
Podiatric Surgery Daily Progress Note  [facility]      Subjective :   Patient seen and examined this am at the bedside. Patient denies any acute overnight events. Patient denies N/V/F/C/SOB. Patient denies calf pain, thigh pain, chest pain. Review of Systems: A 12 point review of symptoms is unremarkable with the exception of the chief complaint. Patient specifically denies nausea, fever, vomiting, chills, shortness of breath, chest pain, abdominal pain, constipation or difficulty urinating. Objective     /72   Pulse 69   Temp 97.5 °F (36.4 °C) (Oral)   Resp 16   Ht 5' 5\" (1.651 m)   Wt 128 lb 12 oz (58.4 kg)   SpO2 96%   BMI 21.42 kg/m²      I/O:  Intake/Output Summary (Last 24 hours) at 8/30/2022 0749  Last data filed at 8/30/2022 0258  Gross per 24 hour   Intake --   Output 1600 ml   Net -1600 ml              Wt Readings from Last 3 Encounters:   08/30/22 128 lb 12 oz (58.4 kg)   07/20/22 115 lb 11.9 oz (52.5 kg)   12/18/17 111 lb 5.3 oz (50.5 kg)       LABS:    Recent Labs     08/28/22  0648 08/29/22  0551   WBC 7.8 5.8   HGB 10.7* 10.1*   HCT 31.5* 30.3*    226        Recent Labs     08/29/22  0551   *   K 4.0   CL 99   CO2 30   PHOS 3.2   BUN 9   CREATININE <0.5*      No results for input(s): PROT, INR, APTT in the last 72 hours. LOWER EXTREMITY EXAMINATION    Dressing to b/l LE intact. No strikethrough noted to the external dressing. No drainage noted to the internal layers of the dressing to the b/l lower extremity. VASCULAR: DP and PT pulses non-palpable. Upon handheld doppler examination the DP/PT were biphasic b/l. CFT is brisk to the digits of the foot b/l. Skin temperature is warm to cool from proximal to distal with no focal calor noted on the b/l lower extremity. No pain with calf compression b/l. NEUROLOGIC: Gross and epicritic sensation is diminished b/l. Protective sensation is diminished at all pedal sites b/l.      DERMATOLOGIC: Nails 1-5 b/l are within normal limits of length, thickness, and color. Webspaces 1-4 b/l are clean, dry, and intact. No hyperkeratosis noted. No subcutaneous nodules, rashes, or other skin lesions noted. Verbal consent was obtained prior to photos taken today:     Right Lower Extremity:         Evidence of skin tear to the lateral proximal aspect of the right leg approximately 4 cm x  5 cm with significant sanguinous crust. No fluctuance, crepitus, purulence or hematoma expressed. The lesion does not tunnel or track with no probe to bone present. Left Lower extremity:        Minimal edema noted to the left lower extremity. Chronic venostasis changes noted to the leg circumferentially. No open lesions noted to the left lower extremity. There is no fluctuance, purulence, crepitus, or open lesions noted. No tunneling, tracking, or probe to bone present. Diffuse ecchymosis present likely secondary to fall. No erythema present to the left lower extremity. MUSCULOSKELETAL: Muscle strength is 4/5 for all pedal groups tested. No pain with palpation of the foot or ankle b/l. Ankle joint ROM is decreased in dorsiflexion with the knee extended. No obvious biomechanical abnormalities. Pain on palpation to the proximal left lower extremity at the popliteal fossa secondary to known DVT. Imaging:       Xray tibfib left 8/26/22  Right knee, left tibia/fibula       HISTORY: Pain           Impression       Right knee: 2 views obtained. Lateral view is limited by oblique positioning. There is severe medial compartment osteoarthritis. There is lateral compartment chondrocalcinosis. Evaluation for knee effusion is technically limited. Left tibia/fibula: 2 views demonstrate a 5 mm well-circumscribed lytic lesion in the distal tibial diaphysis, of unclear etiology. In the absence of known primary neoplasm this is likely of no clinical significance. Otherwise no acute abnormality seen.     Knee arthroplasty is standardly position. CT left Lower extremity:  Narrative   CT TIBIA FIBULA LEFT WO CONTRAST, CT ANKLE LEFT WO CONTRAST       INDICATION: Fall. Left lower extremities pain. COMPARISON: None. TECHNIQUE: CT of the left tibia and fibula and CT of the left ankle were obtained without IV contrast. The axial data sets were reformatted in the coronal and sagittal planes. Up-to-date CT equipment and radiation dose reduction techniques were employed. FINDINGS:        Left total knee arthroplasty in place. Streak artifact from hardware. No significant abnormality identified. No fracture or malalignment in the tibia or fibula. No fracture or malalignment in the left ankle. Severe diffuse swelling and subcutaneous infiltration throughout the lower extremity. No soft tissue gas. No drainable fluid collection. Impression       1. No acute osseous findings. 2.  Diffuse subcutaneous infiltration represent edema or cellulitis. IMPRESSION/RECOMMENDATIONS:    - Mild Edema left lower extremity secondary to fall; would benefit from outpatient lower extremity compression  - Laceration right lower extremity-improving  - Recent history of DVT left lower extremity-   - Diabetes type II  - History of hypertension  - history of Hyperlipidemia      -Patient examined and evaluated at the bedside   -VSS. No Leukocytosis noted   -ESR 9 CRP 11.1  -Imaging reviewed, impressions noted above  -Steri-Strips is applied by the ED were left in place, dressing was applied to the right lower extremity consisting of Adaptic, gauze, Kerlix, Ace.  -Dressing applied to the left lower extremity consisting of gauze, Kerlix, Ace.  -Dressing to remain clean dry and intact  -Elevate lower extremity b/l  - Lower extremity responsive to compression therapy.  Patient would benefit from continued out patient gentle compression.   -Continue with Eliquis at this time due to recent history of DVT left lower extremity.  - UTI suspected cause of fall, will continue to be treated with antibiotics per primary team  -Antibiotics: azithromycin, ceftriaxone   -Weightbearing as tolerated and up with assistance; patient a fall risk. - Fall precautions     DISPO: Laceration right lower extremity; Edema of the left lower extremity secondary to fall and CHF, with significant improvement with compression. History of DVT left lower extremity. Continue antibiotics per primary team.   Patient okay to discharge from podiatric standpoint pending medical clearance and outpatient antibiotic recommendations. Will  need outpatient wound care following discharge. We will continue to monitor while in-house. The assessment and plan will be discussed with JAVIER Johnson DPM   Podiatric Resident PGY1  Pager [phone] or [employer]  8/30/2022, 7:49 AM     Patient was seen and evaluated at bedside. Agree with residents assessment and treatment plan.   Juliet Godwin DPM

## 2022-08-31 VITALS
WEIGHT: 128.75 LBS | BODY MASS INDEX: 21.45 KG/M2 | DIASTOLIC BLOOD PRESSURE: 69 MMHG | RESPIRATION RATE: 16 BRPM | TEMPERATURE: 98.4 F | HEIGHT: 65 IN | OXYGEN SATURATION: 95 % | HEART RATE: 77 BPM | SYSTOLIC BLOOD PRESSURE: 136 MMHG

## 2022-08-31 LAB
BASOPHILS ABSOLUTE: 0 K/UL (ref 0–0.2)
BASOPHILS RELATIVE PERCENT: 0.6 %
EOSINOPHILS ABSOLUTE: 0.2 K/UL (ref 0–0.6)
EOSINOPHILS RELATIVE PERCENT: 3.3 %
GLUCOSE BLD-MCNC: 204 MG/DL (ref 70–99)
GLUCOSE BLD-MCNC: 97 MG/DL (ref 70–99)
HCT VFR BLD CALC: 33.3 % (ref 36–48)
HEMOGLOBIN: 11.1 G/DL (ref 12–16)
LYMPHOCYTES ABSOLUTE: 1.6 K/UL (ref 1–5.1)
LYMPHOCYTES RELATIVE PERCENT: 27.1 %
MCH RBC QN AUTO: 30.8 PG (ref 26–34)
MCHC RBC AUTO-ENTMCNC: 33.3 G/DL (ref 31–36)
MCV RBC AUTO: 92.5 FL (ref 80–100)
MONOCYTES ABSOLUTE: 0.7 K/UL (ref 0–1.3)
MONOCYTES RELATIVE PERCENT: 11.7 %
NEUTROPHILS ABSOLUTE: 3.3 K/UL (ref 1.7–7.7)
NEUTROPHILS RELATIVE PERCENT: 57.3 %
PDW BLD-RTO: 12.8 % (ref 12.4–15.4)
PERFORMED ON: ABNORMAL
PERFORMED ON: NORMAL
PLATELET # BLD: 307 K/UL (ref 135–450)
PMV BLD AUTO: 7 FL (ref 5–10.5)
RBC # BLD: 3.6 M/UL (ref 4–5.2)
WBC # BLD: 5.8 K/UL (ref 4–11)

## 2022-08-31 PROCEDURE — 2580000003 HC RX 258: Performed by: INTERNAL MEDICINE

## 2022-08-31 PROCEDURE — 36415 COLL VENOUS BLD VENIPUNCTURE: CPT

## 2022-08-31 PROCEDURE — 85025 COMPLETE CBC W/AUTO DIFF WBC: CPT

## 2022-08-31 PROCEDURE — 6370000000 HC RX 637 (ALT 250 FOR IP): Performed by: INTERNAL MEDICINE

## 2022-08-31 RX ORDER — LACTOBACILLUS RHAMNOSUS GG 10B CELL
1 CAPSULE ORAL DAILY
Qty: 10 CAPSULE | Refills: 0 | Status: SHIPPED | OUTPATIENT
Start: 2022-08-31 | End: 2022-09-10

## 2022-08-31 RX ORDER — CIPROFLOXACIN 500 MG/1
500 TABLET, FILM COATED ORAL 2 TIMES DAILY
Qty: 4 TABLET | Refills: 0 | Status: SHIPPED | OUTPATIENT
Start: 2022-08-31 | End: 2022-09-03

## 2022-08-31 RX ORDER — FUROSEMIDE 20 MG/1
20 TABLET ORAL DAILY
Qty: 60 TABLET | Refills: 3 | Status: SHIPPED | OUTPATIENT
Start: 2022-08-31

## 2022-08-31 RX ORDER — TRAMADOL HYDROCHLORIDE 50 MG/1
25 TABLET ORAL 2 TIMES DAILY
Qty: 15 TABLET | Refills: 0 | Status: SHIPPED | OUTPATIENT
Start: 2022-08-31 | End: 2022-09-03

## 2022-08-31 RX ADMIN — Medication 1 CAPSULE: at 09:33

## 2022-08-31 RX ADMIN — SODIUM CHLORIDE, PRESERVATIVE FREE 10 ML: 5 INJECTION INTRAVENOUS at 09:34

## 2022-08-31 RX ADMIN — FUROSEMIDE 20 MG: 20 TABLET ORAL at 09:33

## 2022-08-31 RX ADMIN — APIXABAN 5 MG: 5 TABLET, FILM COATED ORAL at 09:33

## 2022-08-31 RX ADMIN — GABAPENTIN 400 MG: 400 CAPSULE ORAL at 09:33

## 2022-08-31 RX ADMIN — Medication: at 09:34

## 2022-08-31 NOTE — PLAN OF CARE
Problem: Discharge Planning  Goal: Discharge to home or other facility with appropriate resources  8/31/2022 0754 by Sophy Tadeo RN  Outcome: Progressing       Problem: Skin/Tissue Integrity  Goal: Absence of new skin breakdown  Description: 1. Monitor for areas of redness and/or skin breakdown  2. Assess vascular access sites hourly  3. Every 4-6 hours minimum:  Change oxygen saturation probe site  4. Every 4-6 hours:  If on nasal continuous positive airway pressure, respiratory therapy assess nares and determine need for appliance change or resting period.   8/31/2022 0754 by Sophy Tadeo RN  Outcome: Progressing     Problem: ABCDS Injury Assessment  Goal: Absence of physical injury  8/31/2022 0754 by Sophy Tadeo RN  Outcome: Progressing     Problem: Safety - Adult  Goal: Free from fall injury  8/31/2022 0754 by Sophy Tadeo RN  Outcome: Progressing     Problem: Chronic Conditions and Co-morbidities  Goal: Patient's chronic conditions and co-morbidity symptoms are monitored and maintained or improved  8/31/2022 0754 by Sophy Tadeo RN  Outcome: Progressing     Problem: Pain  Goal: Verbalizes/displays adequate comfort level or baseline comfort level  8/31/2022 0754 by Sophy Tadeo RN  Outcome: Progressing

## 2022-08-31 NOTE — PLAN OF CARE
Problem: Discharge Planning  Goal: Discharge to home or other facility with appropriate resources  Outcome: Progressing     Problem: Skin/Tissue Integrity  Goal: Absence of new skin breakdown  Description: 1. Monitor for areas of redness and/or skin breakdown  2. Assess vascular access sites hourly  3. Every 4-6 hours minimum:  Change oxygen saturation probe site  4. Every 4-6 hours:  If on nasal continuous positive airway pressure, respiratory therapy assess nares and determine need for appliance change or resting period. 8/31/2022 0429 by Kasey Naik RN  Outcome: Progressing  8/30/2022 1555 by Andreia Nayak  Outcome: Progressing  Note: Pt has blanchable redness to coccyx. Venelex cream applied. Pt in specialty mattress. Turning pt and pt up to chair. Problem: ABCDS Injury Assessment  Goal: Absence of physical injury  Outcome: Progressing     Problem: Safety - Adult  Goal: Free from fall injury  8/31/2022 0429 by Kasey Naik RN  Outcome: Progressing  8/30/2022 1555 by Andreia Nayak  Outcome: Progressing  Note: Pt free from falls/ injury during duration of shift. Fall risk precautions in place.       Problem: Chronic Conditions and Co-morbidities  Goal: Patient's chronic conditions and co-morbidity symptoms are monitored and maintained or improved  Outcome: Progressing     Problem: Pain  Goal: Verbalizes/displays adequate comfort level or baseline comfort level  Outcome: Progressing

## 2022-08-31 NOTE — PROGRESS NOTES
Pt is discharged from the unit along with all of her belongings. Report called to the receiving nurse at season.

## 2022-08-31 NOTE — PROGRESS NOTES
Podiatric Surgery Daily Progress Note  [facility]      Subjective :   Patient seen and examined this am at the bedside. Patient relates that the dressing became too tight overnight and that she asked the nurse if she could take some of the ace wrap off on the RLE. Patient denies N/V/F/C/SOB. Patient denies calf pain, thigh pain, chest pain. Review of Systems: A 12 point review of symptoms is unremarkable with the exception of the chief complaint. Patient specifically denies nausea, fever, vomiting, chills, shortness of breath, chest pain, abdominal pain, constipation or difficulty urinating. Objective     BP (!) 143/65   Pulse 75   Temp 97.5 °F (36.4 °C) (Oral)   Resp 16   Ht 5' 5\" (1.651 m)   Wt 128 lb 12 oz (58.4 kg)   SpO2 95%   BMI 21.42 kg/m²      I/O:  Intake/Output Summary (Last 24 hours) at 8/31/2022 0740  Last data filed at 8/31/2022 0630  Gross per 24 hour   Intake 360 ml   Output 1400 ml   Net -1040 ml              Wt Readings from Last 3 Encounters:   08/30/22 128 lb 12 oz (58.4 kg)   07/20/22 115 lb 11.9 oz (52.5 kg)   12/18/17 111 lb 5.3 oz (50.5 kg)       LABS:    Recent Labs     08/30/22  0832 08/31/22  0606   WBC 5.4 5.8   HGB 10.2* 11.1*   HCT 30.5* 33.3*    307        Recent Labs     08/30/22  0832      K 4.1      CO2 29   PHOS 3.4   BUN 9   CREATININE 0.5*      No results for input(s): PROT, INR, APTT in the last 72 hours. LOWER EXTREMITY EXAMINATION    Dressing to b/l LE intact. No strikethrough noted to the external dressing. No drainage noted to the internal layers of the dressing to the b/l lower extremity. VASCULAR: DP and PT pulses non-palpable. Upon handheld doppler examination the DP/PT were biphasic b/l. CFT is brisk to the digits of the foot b/l. Skin temperature is warm to cool from proximal to distal with no focal calor noted on the b/l lower extremity. No pain with calf compression b/l.      NEUROLOGIC: Gross and epicritic sensation is diminished b/l. Protective sensation is diminished at all pedal sites b/l. DERMATOLOGIC:  Right Lower Extremity:     Evidence of skin tear to the lateral proximal aspect of the right leg approximately 4 cm x  5 cm with significant sanguinous crust. No fluctuance, crepitus, purulence or hematoma expressed. The lesion does not tunnel or track with no probe to bone present. Left Lower extremity:    Minimal edema noted to the left lower extremity. Chronic venostasis changes noted to the leg circumferentially. No open lesions noted to the left lower extremity. There is no fluctuance, purulence, crepitus, or malodor present. No tunneling, tracking, or probe to bone present. Diffuse ecchymosis present likely secondary to fall. No erythema present to the left lower extremity. MUSCULOSKELETAL: Muscle strength is 4/5 for all pedal groups tested. No pain with palpation of the foot or ankle b/l. Ankle joint ROM is decreased in dorsiflexion with the knee extended. No obvious biomechanical abnormalities. Pain on palpation to the proximal left lower extremity at the popliteal fossa secondary to known DVT. Imaging:       Xray tibfib left 8/26/22  Right knee, left tibia/fibula       HISTORY: Pain           Impression       Right knee: 2 views obtained. Lateral view is limited by oblique positioning. There is severe medial compartment osteoarthritis. There is lateral compartment chondrocalcinosis. Evaluation for knee effusion is technically limited. Left tibia/fibula: 2 views demonstrate a 5 mm well-circumscribed lytic lesion in the distal tibial diaphysis, of unclear etiology. In the absence of known primary neoplasm this is likely of no clinical significance. Otherwise no acute abnormality seen. Knee arthroplasty is standardly position. CT left Lower extremity:  Narrative   CT TIBIA FIBULA LEFT WO CONTRAST, CT ANKLE LEFT WO CONTRAST       INDICATION: Fall.  Left lower extremities Fall precautions     DISPO: Laceration right lower extremity; Edema of the left lower extremity secondary to fall and CHF, with significant improvement with compression. History of DVT left lower extremity. Continue antibiotics per primary team.   Patient okay to discharge from podiatric standpoint pending medical clearance and outpatient antibiotic recommendations. Will  need outpatient wound care following discharge. Will continue to monitor while in-house. Patient seen and evaluated bedside with JAVIER Jaime DPM   Podiatric Resident PGY1  Pager [phone] or [facility]  8/31/2022, 7:40 AM     Patient was seen and evaluated at bedside. Agree with residents assessment and treatment plan.   Adebayo Payne DPM

## 2022-08-31 NOTE — PROGRESS NOTES
Patient alert and oriented X4. VSS. No complain of pain at this time. Ace wrap on BLE CDI. Redness on right buttocks. Voiding via pure wick. All fall precaution in place. Will continue to monitor.

## 2022-08-31 NOTE — CARE COORDINATION
Case Management Assessment            Discharge Note                    Date / Time of Note: 8/31/2022 10:02 AM                  Discharge Note Completed by: RAMONA Brock, LSW    Patient Name: Jinny Boo   YOB: 1925  Diagnosis: Pneumonia [J18.9]  Atypical pneumonia [J18.9]  Cellulitis of left lower extremity [L03.116]  Fall, initial encounter [W19. ADHN]   Date / Time: 8/26/2022  3:46 PM    Current PCP: Jett Cooney MD  Clinic patient: No    Hospitalization in the last 30 days: No    Advance Directives:  Code Status: Full Code  [place] DNR form completed and on chart: No    Financial:  Payor: Kristina Aranda / Plan: MEDICARE PART A AND B / Product Type: *No Product type* /      Pharmacy:    [facility] [id number] [facility] [phone]  [address] [address] [address]  Phone: [phone] Fax: [phone]      Assistance purchasing medications?: Potential Assistance Purchasing Medications: No  Assistance provided by Case Management: None at this time    Does patient want to participate in local refill/ meds to beds program?: Yes    Meds To Beds General Rules:  1. Can ONLY be done Monday- Friday between 8:30am-5pm  2. Prescription(s) must be in pharmacy by 3pm to be filled same day  3. Copy of patient's insurance/ prescription drug card and patient face sheet must be sent along with the prescription(s)  4. Cost of Rx cannot be added to hospital bill. If financial assistance is needed, please contact unit  or ;  or  CANNOT provide pharmacy voucher for patients co-pays  5.  Patients can then  the prescription on their way out of the hospital at discharge, or pharmacy can deliver to the bedside if staff is available. (payment due at time of pick-up or delivery - cash, check, or card accepted)     Able to afford home medications/ co-pay costs: Yes    ADLS:  Current PT AM-PAC Score: 17 /24  Current OT AM-PAC Score: 15 /24      DISCHARGE Disposition: [place] (SNF): [facility] Phone: [phone] Fax: [phone]    LOC at discharge: Skilled  ANUP Completed: Yes    Notification completed in HENS/PAS?:  Yes : CM has completed HENS online through secure website for SNF admission at [facility]. Document ID #: [id number]    IMM Completed:   Not Indicated    Transportation:  Transportation PLAN for discharge: EMS transportation   Mode of Transport: Ambulance stretcher - BLS  Reason for medical transport: Bed confined: Meets the following criteria 1) unable to get out of bed without assistance or ambulate, 2) unable to safely sit up in a wheelchair, 3) unable to maintain erect seating position in a chair for time needed for transport  Name of Transport Company:  [employer]    Phone: [phone]  Time of Transport: noon     Transport form completed: Yes    Home Care:  [address] ordered at discharge: No    Durable Medical Equipment:  DME Provider: none  Equipment obtained during hospitalization: none    Home Oxygen and Respiratory Equipment:  Oxygen needed at discharge?: No    Dialysis:  Dialysis patient: No    Referrals made at [facility] for outpatient continued care:  Not Applicable    Additional CM Notes:      Patient is from home. Patient will discharge to a SNF - patient preference is [facility]. ROSALIA scheduled transport via [employer] at noon. ROSALIA spoke to Pt's son, he is in agreement w/DCP and him or his sister will meet Pt over at the SNF later today. ROSALIA spoke to [facility], they are ready for Pt today. Report: [phone] fax: [phone]    The Plan for Transition of Care is related to the following treatment goals of Pneumonia [J18.9]  Atypical pneumonia [J18.9]  Cellulitis of left lower extremity [L03.116]  Fall, initial encounter [W19. XXXA]    The Patient and/or patient representative Alvaro Kulkarni and her family were provided with a choice of provider and agrees with the discharge plan Yes    Freedom of choice list was provided with basic dialogue that supports the patient's individualized plan of care/goals and shares the quality data associated with the providers.  Yes    Care Transitions patient: No    RAMONA Hodge, [facility]  Case Management Department  Ph: [phone]

## 2022-08-31 NOTE — DISCHARGE SUMMARY
Hospital Discharge Summary    Patient's PCP: Mary Lopez MD  Admit Date: 8/26/2022   Discharge Date: 8/31/22    Admitting Physician: Dr. Jeff Fitch MD  Discharge Physician: Dr. Sterling Oscar MD     Consults:   IP CONSULT TO HOSPITALIST  IP CONSULT TO PHARMACY  IP CONSULT TO PODIATRY  IP CONSULT TO PHARMACY  IP CONSULT TO PALLIATIVE CARE    Brief HPI: This is a very pleasant 80 y.o. 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 She was walking to the restroom with her walker, and lost balance and fell. She had been feeling very weak. She denies hitting her head or trauma to any body parts. She denies loss of consciousness. She denies focal weakness prior to or since fall. She has not had chest pain, dizziness or lightheadedness     She was admitted   7/14/2022 -7/20/2022 with fall and large scalp laceration requiring staples with urinary tract infection, and was treated with ceftriaxone,  discharged on cefdinir. She was at rehab where she was not moving around much as per son, and she was found with a DVT 10 days PTA, and placed on eliquis. She was discharged from [address] [place] [address] to home yesterday. Brief hospital course:    UTI: treated with iv abx, on po abx at d/c     2. Generalized weakness/ Recurrent falls:- likely due to infection, PTOT    3. Left LE cellulitis:- - Consulted Podiatry for extensive LE edema with skin breakdown. Appreciate input: Left lower extremity CT ordered: Diffuse subcutaneous infiltration represent edema or cellulitis. Local wound care. 4. Recent Left LE DVT:- continue [address]    5. Abnormal chest x-ray, possible atypical pna     6. She has chronic diastolic Heart failure. Recent echocardiogram in July reviewed; appears to show Grade II diastolic dysfunction with elevated LV filling pressures;  Mod- severe MR and Mod LA dilatation. Will place on small dose lasix. Monitor I/o. Invasive procedures:  None     Discharge Diagnoses:   Principal Problem:    Pneumonia  Resolved Problems:    * No resolved hospital problems. *      Physical Exam: /69   Pulse 77   Temp 98.4 °F (36.9 °C) (Oral)   Resp 16   Ht 5' 5\" (1.651 m)   Wt 128 lb 12 oz (58.4 kg)   SpO2 95%   BMI 21.42 kg/m²     General appearance: alert, appears stated age, and cooperative  Head: Normocephalic, atraumatic  Neck: no adenopathy, no carotid bruit, no JVD  Lungs: clear to auscultation bilaterally  Heart: regular rate and rhythm, S1, S2 normal, no murmur  Abdomen: soft, non-tender; bowel sounds normal; no masses  Extremities: Dressing on bilateral LEs   Pulses: 2+ and symmetric    Significant diagnostic studies that may require follow up:  XR KNEE RIGHT (3 VIEWS)    Result Date: 8/26/2022  Right knee, left tibia/fibula HISTORY: Pain     Right knee: 2 views obtained. Lateral view is limited by oblique positioning. There is severe medial compartment osteoarthritis. There is lateral compartment chondrocalcinosis. Evaluation for knee effusion is technically limited. Left tibia/fibula: 2 views demonstrate a 5 mm well-circumscribed lytic lesion in the distal tibial diaphysis, of unclear etiology. In the absence of known primary neoplasm this is likely of no clinical significance. Otherwise no acute abnormality seen. Knee arthroplasty is standardly position. XR TIBIA FIBULA LEFT (2 VIEWS)    Result Date: 8/26/2022  Right knee, left tibia/fibula HISTORY: Pain     Right knee: 2 views obtained. Lateral view is limited by oblique positioning. There is severe medial compartment osteoarthritis. There is lateral compartment chondrocalcinosis. Evaluation for knee effusion is technically limited. Left tibia/fibula: 2 views demonstrate a 5 mm well-circumscribed lytic lesion in the distal tibial diaphysis, of unclear etiology.  In the absence of known primary neoplasm this is likely of no clinical significance. Otherwise no acute abnormality seen. Knee arthroplasty is standardly position. CT HEAD WO CONTRAST    Result Date: 8/26/2022  EXAM: CT HEAD WO CONTRAST INDICATION: fall, anticoag, ?head bleed; pain COMPARISON: None. TECHNICAL: Axial CT imaging obtained from vertex to skull base. Axial images and multiplanar reformatted images reviewed. CT radiation dose optimization techniques (automated exposure control, and use of iterative reconstruction techniques, or adjustment of the mA and/or kV according to patient size) were used to limit patient radiation dose. IV Contrast: None. FINDINGS: INTRACRANIAL HEMORRHAGE: None. VENTRICLES: Normal in size and configuration for age. BRAIN PARENCHYMA: Stable severe confluent white matter low-attenuation. SKULL: No fracture PARANASAL SINUSES AND MASTOIDS: No acute sinusitis or mastoiditis. ORBITS: No acute abnormality EXTRACRANIAL SOFT TISSUES: Normal.     1. No intracranial hemorrhage 2. Stable severe chronic white matter disease    XR CHEST PORTABLE    Result Date: 8/26/2022  Chest portable 1626 HISTORY: Fall     Hazy bibasilar consolidation, worse on the right. This may reflect atypical pneumonia, less likely asymmetric pulmonary edema. Normal cardiomediastinal silhouette. No visualized fracture. CT TIBIA FIBULA LEFT WO CONTRAST    Result Date: 8/27/2022  CT TIBIA FIBULA LEFT WO CONTRAST, CT ANKLE LEFT WO CONTRAST INDICATION: Fall. Left lower extremities pain. COMPARISON: None. TECHNIQUE: CT of the left tibia and fibula and CT of the left ankle were obtained without IV contrast. The axial data sets were reformatted in the coronal and sagittal planes. Up-to-date CT equipment and radiation dose reduction techniques were employed. FINDINGS: Left total knee arthroplasty in place. Streak artifact from hardware. No significant abnormality identified. No fracture or malalignment in the tibia or fibula.  No fracture or malalignment in the left ankle. Severe diffuse swelling and subcutaneous infiltration throughout the lower extremity. No soft tissue gas. No drainable fluid collection. 1.  No acute osseous findings. 2.  Diffuse subcutaneous infiltration represent edema or cellulitis. CT ANKLE LEFT WO CONTRAST    Result Date: 8/27/2022  CT TIBIA FIBULA LEFT WO CONTRAST, CT ANKLE LEFT WO CONTRAST INDICATION: Fall. Left lower extremities pain. COMPARISON: None. TECHNIQUE: CT of the left tibia and fibula and CT of the left ankle were obtained without IV contrast. The axial data sets were reformatted in the coronal and sagittal planes. Up-to-date CT equipment and radiation dose reduction techniques were employed. FINDINGS: Left total knee arthroplasty in place. Streak artifact from hardware. No significant abnormality identified. No fracture or malalignment in the tibia or fibula. No fracture or malalignment in the left ankle. Severe diffuse swelling and subcutaneous infiltration throughout the lower extremity. No soft tissue gas. No drainable fluid collection. 1.  No acute osseous findings. 2.  Diffuse subcutaneous infiltration represent edema or cellulitis. Treatments: As above. Discharge Medications:     Medication List        START taking these medications      furosemide 20 MG tablet  Commonly known as: LASIX  Take 1 tablet by mouth daily     lactobacillus Caps capsule  Take 1 capsule by mouth daily for 10 days            CHANGE how you take these medications      gabapentin 400 MG capsule  Commonly known as: NEURONTIN  What changed: Another medication with the same name was removed. Continue taking this medication, and follow the directions you see here.             CONTINUE taking these medications      Biotin 10 MG Caps     blood glucose test strips strip  Commonly known as: ASCENSIA AUTODISC VI;ONE TOUCH ULTRA TEST VI     Cyanocobalamin 5000 MCG Tbdp     Eliquis 5 MG Tabs tablet  Generic drug: apixaban     MAGNESIUM PO     metFORMIN 1000 MG tablet  Commonly known as: GLUCOPHAGE            STOP taking these medications      traMADol 50 MG tablet  Commonly known as: ULTRAM            ASK your doctor about these medications      ciprofloxacin 500 MG tablet  Commonly known as: CIPRO  Take 1 tablet by mouth 2 times daily for 3 days  Ask about: Should I take this medication?     traMADol 50 MG tablet  Commonly known as: ULTRAM  Take 0.5 tablets by mouth in the morning and at bedtime for 3 days. Ask about: Should I take this medication? Where to Get Your Medications        These medications were sent to [facility], [address] [facility] [address]. [address] [place] [phone] - F [phone]  [address] [place] [address]      Phone: [phone]   ciprofloxacin 500 MG tablet  furosemide 20 MG tablet  lactobacillus Caps capsule       You can get these medications from any pharmacy    Bring a paper prescription for each of these medications  traMADol 50 MG tablet         Activity: activity as tolerated  Diet: No diet orders on file      Disposition: SNF  Discharged Condition: Stable  Follow Up:   [address] [place] [address] [address]  [address] [place] [address] Drive  [phone]  Follow up in 1 week(s)  For wound re-check with Dr. Oksana French DPM    [facility] [place] [id number] [phone]          Code status:  Prior         Total time spent on discharge, finalizing medications, referrals and arranging outpatient follow up was more than 45 minutes      Thank you Dr. Adri Fernandez MD for the opportunity to be involved in this patients care.

## 2024-09-29 ENCOUNTER — HOSPITAL ENCOUNTER (INPATIENT)
Age: 89
LOS: 3 days | Discharge: NURSING, REHAB OR LONG-TERM CARE | DRG: 638 | End: 2024-10-02
Attending: EMERGENCY MEDICINE | Admitting: INTERNAL MEDICINE
Payer: MEDICARE

## 2024-09-29 DIAGNOSIS — R73.9 HYPERGLYCEMIA: ICD-10-CM

## 2024-09-29 DIAGNOSIS — N30.01 ACUTE CYSTITIS WITH HEMATURIA: Primary | ICD-10-CM

## 2024-09-29 DIAGNOSIS — R53.1 GENERALIZED WEAKNESS: ICD-10-CM

## 2024-09-29 LAB
ALBUMIN SERPL-MCNC: 3.4 G/DL (ref 3.4–5)
ANION GAP SERPL CALCULATED.3IONS-SCNC: 10 MMOL/L (ref 3–16)
ANION GAP SERPL CALCULATED.3IONS-SCNC: 9 MMOL/L (ref 3–16)
BACTERIA URNS QL MICRO: ABNORMAL /HPF
BASOPHILS # BLD: 0.1 K/UL (ref 0–0.2)
BASOPHILS NFR BLD: 0.5 %
BILIRUB UR QL STRIP.AUTO: NEGATIVE
BUN SERPL-MCNC: 19 MG/DL (ref 7–20)
BUN SERPL-MCNC: 20 MG/DL (ref 7–20)
CALCIUM SERPL-MCNC: 10 MG/DL (ref 8.3–10.6)
CALCIUM SERPL-MCNC: 9.5 MG/DL (ref 8.3–10.6)
CHLORIDE SERPL-SCNC: 90 MMOL/L (ref 99–110)
CHLORIDE SERPL-SCNC: 90 MMOL/L (ref 99–110)
CLARITY UR: ABNORMAL
CO2 SERPL-SCNC: 24 MMOL/L (ref 21–32)
CO2 SERPL-SCNC: 26 MMOL/L (ref 21–32)
COLOR UR: YELLOW
CREAT SERPL-MCNC: 0.6 MG/DL (ref 0.6–1.2)
CREAT SERPL-MCNC: 0.6 MG/DL (ref 0.6–1.2)
DEPRECATED RDW RBC AUTO: 14.2 % (ref 12.4–15.4)
EOSINOPHIL # BLD: 0 K/UL (ref 0–0.6)
EOSINOPHIL NFR BLD: 0.1 %
EPI CELLS #/AREA URNS HPF: ABNORMAL /HPF (ref 0–5)
FLUAV RNA RESP QL NAA+PROBE: NOT DETECTED
FLUBV RNA RESP QL NAA+PROBE: NOT DETECTED
GFR SERPLBLD CREATININE-BSD FMLA CKD-EPI: 81 ML/MIN/{1.73_M2}
GFR SERPLBLD CREATININE-BSD FMLA CKD-EPI: 81 ML/MIN/{1.73_M2}
GLUCOSE BLD-MCNC: 339 MG/DL (ref 70–99)
GLUCOSE BLD-MCNC: 346 MG/DL (ref 70–99)
GLUCOSE BLD-MCNC: 380 MG/DL (ref 70–99)
GLUCOSE SERPL-MCNC: 350 MG/DL (ref 70–99)
GLUCOSE SERPL-MCNC: 365 MG/DL (ref 70–99)
GLUCOSE UR STRIP.AUTO-MCNC: >=1000 MG/DL
HCT VFR BLD AUTO: 31.2 % (ref 36–48)
HGB BLD-MCNC: 10.6 G/DL (ref 12–16)
HGB UR QL STRIP.AUTO: ABNORMAL
KETONES UR STRIP.AUTO-MCNC: ABNORMAL MG/DL
LEUKOCYTE ESTERASE UR QL STRIP.AUTO: ABNORMAL
LYMPHOCYTES # BLD: 0.6 K/UL (ref 1–5.1)
LYMPHOCYTES NFR BLD: 4.6 %
MAGNESIUM SERPL-MCNC: 1.8 MG/DL (ref 1.8–2.4)
MCH RBC QN AUTO: 32 PG (ref 26–34)
MCHC RBC AUTO-ENTMCNC: 34.1 G/DL (ref 31–36)
MCV RBC AUTO: 93.9 FL (ref 80–100)
MONOCYTES # BLD: 1.4 K/UL (ref 0–1.3)
MONOCYTES NFR BLD: 11 %
NEUTROPHILS # BLD: 10.8 K/UL (ref 1.7–7.7)
NEUTROPHILS NFR BLD: 83.8 %
NITRITE UR QL STRIP.AUTO: NEGATIVE
OSMOLALITY SERPL: 288 MOSM/KG (ref 278–305)
OSMOLALITY UR: 437 MOSM/KG (ref 390–1070)
PERFORMED ON: ABNORMAL
PH UR STRIP.AUTO: 6 [PH] (ref 5–8)
PHOSPHATE SERPL-MCNC: 2.7 MG/DL (ref 2.5–4.9)
PLATELET # BLD AUTO: 257 K/UL (ref 135–450)
PMV BLD AUTO: 7.1 FL (ref 5–10.5)
POTASSIUM SERPL-SCNC: 4.8 MMOL/L (ref 3.5–5.1)
POTASSIUM SERPL-SCNC: 5 MMOL/L (ref 3.5–5.1)
PROT UR STRIP.AUTO-MCNC: 30 MG/DL
RBC # BLD AUTO: 3.32 M/UL (ref 4–5.2)
RBC #/AREA URNS HPF: ABNORMAL /HPF (ref 0–4)
SARS-COV-2 RNA RESP QL NAA+PROBE: NOT DETECTED
SODIUM SERPL-SCNC: 124 MMOL/L (ref 136–145)
SODIUM SERPL-SCNC: 125 MMOL/L (ref 136–145)
SODIUM SERPL-SCNC: 130 MMOL/L (ref 136–145)
SODIUM UR-SCNC: <20 MMOL/L
SP GR UR STRIP.AUTO: 1.02 (ref 1–1.03)
TROPONIN, HIGH SENSITIVITY: 31 NG/L (ref 0–14)
TROPONIN, HIGH SENSITIVITY: 34 NG/L (ref 0–14)
UA DIPSTICK W REFLEX MICRO PNL UR: YES
URATE SERPL-MCNC: 3.1 MG/DL (ref 2.6–6)
URN SPEC COLLECT METH UR: ABNORMAL
UROBILINOGEN UR STRIP-ACNC: 0.2 E.U./DL
WBC # BLD AUTO: 12.9 K/UL (ref 4–11)
WBC #/AREA URNS HPF: ABNORMAL /HPF (ref 0–5)

## 2024-09-29 PROCEDURE — 87077 CULTURE AEROBIC IDENTIFY: CPT

## 2024-09-29 PROCEDURE — 84550 ASSAY OF BLOOD/URIC ACID: CPT

## 2024-09-29 PROCEDURE — 80069 RENAL FUNCTION PANEL: CPT

## 2024-09-29 PROCEDURE — 81001 URINALYSIS AUTO W/SCOPE: CPT

## 2024-09-29 PROCEDURE — 6370000000 HC RX 637 (ALT 250 FOR IP)

## 2024-09-29 PROCEDURE — 93005 ELECTROCARDIOGRAM TRACING: CPT | Performed by: EMERGENCY MEDICINE

## 2024-09-29 PROCEDURE — 96374 THER/PROPH/DIAG INJ IV PUSH: CPT

## 2024-09-29 PROCEDURE — 83036 HEMOGLOBIN GLYCOSYLATED A1C: CPT

## 2024-09-29 PROCEDURE — 85025 COMPLETE CBC W/AUTO DIFF WBC: CPT

## 2024-09-29 PROCEDURE — 99285 EMERGENCY DEPT VISIT HI MDM: CPT

## 2024-09-29 PROCEDURE — 6360000002 HC RX W HCPCS: Performed by: EMERGENCY MEDICINE

## 2024-09-29 PROCEDURE — 96372 THER/PROPH/DIAG INJ SC/IM: CPT

## 2024-09-29 PROCEDURE — 87186 SC STD MICRODIL/AGAR DIL: CPT

## 2024-09-29 PROCEDURE — 87086 URINE CULTURE/COLONY COUNT: CPT

## 2024-09-29 PROCEDURE — 82570 ASSAY OF URINE CREATININE: CPT

## 2024-09-29 PROCEDURE — 84295 ASSAY OF SERUM SODIUM: CPT

## 2024-09-29 PROCEDURE — 2580000003 HC RX 258: Performed by: EMERGENCY MEDICINE

## 2024-09-29 PROCEDURE — 84300 ASSAY OF URINE SODIUM: CPT

## 2024-09-29 PROCEDURE — 82607 VITAMIN B-12: CPT

## 2024-09-29 PROCEDURE — 2580000003 HC RX 258

## 2024-09-29 PROCEDURE — 83930 ASSAY OF BLOOD OSMOLALITY: CPT

## 2024-09-29 PROCEDURE — 87636 SARSCOV2 & INF A&B AMP PRB: CPT

## 2024-09-29 PROCEDURE — 1200000000 HC SEMI PRIVATE

## 2024-09-29 PROCEDURE — 36415 COLL VENOUS BLD VENIPUNCTURE: CPT

## 2024-09-29 PROCEDURE — 6370000000 HC RX 637 (ALT 250 FOR IP): Performed by: EMERGENCY MEDICINE

## 2024-09-29 PROCEDURE — 83735 ASSAY OF MAGNESIUM: CPT

## 2024-09-29 PROCEDURE — 84484 ASSAY OF TROPONIN QUANT: CPT

## 2024-09-29 PROCEDURE — 83935 ASSAY OF URINE OSMOLALITY: CPT

## 2024-09-29 RX ORDER — PIOGLITAZONEHYDROCHLORIDE 30 MG/1
30 TABLET ORAL DAILY
COMMUNITY

## 2024-09-29 RX ORDER — LOSARTAN POTASSIUM 100 MG/1
100 TABLET ORAL DAILY
COMMUNITY

## 2024-09-29 RX ORDER — ENOXAPARIN SODIUM 100 MG/ML
40 INJECTION SUBCUTANEOUS DAILY
Status: DISCONTINUED | OUTPATIENT
Start: 2024-09-30 | End: 2024-10-02 | Stop reason: HOSPADM

## 2024-09-29 RX ORDER — ACETAMINOPHEN 650 MG/1
650 SUPPOSITORY RECTAL EVERY 6 HOURS PRN
Status: DISCONTINUED | OUTPATIENT
Start: 2024-09-29 | End: 2024-10-02 | Stop reason: HOSPADM

## 2024-09-29 RX ORDER — GABAPENTIN 400 MG/1
400 CAPSULE ORAL 2 TIMES DAILY
Status: DISCONTINUED | OUTPATIENT
Start: 2024-09-29 | End: 2024-10-02 | Stop reason: HOSPADM

## 2024-09-29 RX ORDER — POTASSIUM CHLORIDE 1500 MG/1
40 TABLET, EXTENDED RELEASE ORAL PRN
Status: DISCONTINUED | OUTPATIENT
Start: 2024-09-29 | End: 2024-10-02 | Stop reason: HOSPADM

## 2024-09-29 RX ORDER — SPIRONOLACTONE 25 MG/1
25 TABLET ORAL DAILY
COMMUNITY

## 2024-09-29 RX ORDER — DEXTROSE MONOHYDRATE 100 MG/ML
INJECTION, SOLUTION INTRAVENOUS CONTINUOUS PRN
Status: DISCONTINUED | OUTPATIENT
Start: 2024-09-29 | End: 2024-10-02 | Stop reason: HOSPADM

## 2024-09-29 RX ORDER — INSULIN LISPRO 100 [IU]/ML
0-8 INJECTION, SOLUTION INTRAVENOUS; SUBCUTANEOUS
Status: DISCONTINUED | OUTPATIENT
Start: 2024-09-30 | End: 2024-10-02 | Stop reason: HOSPADM

## 2024-09-29 RX ORDER — INSULIN LISPRO 100 [IU]/ML
0-4 INJECTION, SOLUTION INTRAVENOUS; SUBCUTANEOUS NIGHTLY
Status: DISCONTINUED | OUTPATIENT
Start: 2024-09-29 | End: 2024-09-29

## 2024-09-29 RX ORDER — INSULIN LISPRO 100 [IU]/ML
0-16 INJECTION, SOLUTION INTRAVENOUS; SUBCUTANEOUS
Status: DISCONTINUED | OUTPATIENT
Start: 2024-09-29 | End: 2024-09-29

## 2024-09-29 RX ORDER — SODIUM CHLORIDE 9 MG/ML
INJECTION, SOLUTION INTRAVENOUS CONTINUOUS
Status: DISCONTINUED | OUTPATIENT
Start: 2024-09-29 | End: 2024-09-30

## 2024-09-29 RX ORDER — SODIUM CHLORIDE 450 MG/100ML
INJECTION, SOLUTION INTRAVENOUS CONTINUOUS
Status: DISCONTINUED | OUTPATIENT
Start: 2024-09-29 | End: 2024-09-29

## 2024-09-29 RX ORDER — POTASSIUM CHLORIDE 7.45 MG/ML
10 INJECTION INTRAVENOUS PRN
Status: DISCONTINUED | OUTPATIENT
Start: 2024-09-29 | End: 2024-10-02 | Stop reason: HOSPADM

## 2024-09-29 RX ORDER — ROSUVASTATIN CALCIUM 10 MG/1
10 TABLET, COATED ORAL DAILY
COMMUNITY

## 2024-09-29 RX ORDER — TRAMADOL HYDROCHLORIDE 50 MG/1
50 TABLET ORAL 3 TIMES DAILY PRN
COMMUNITY

## 2024-09-29 RX ORDER — SODIUM CHLORIDE 9 MG/ML
INJECTION, SOLUTION INTRAVENOUS PRN
Status: DISCONTINUED | OUTPATIENT
Start: 2024-09-29 | End: 2024-10-02 | Stop reason: HOSPADM

## 2024-09-29 RX ORDER — ONDANSETRON 4 MG/1
4 TABLET, ORALLY DISINTEGRATING ORAL EVERY 8 HOURS PRN
Status: DISCONTINUED | OUTPATIENT
Start: 2024-09-29 | End: 2024-10-02 | Stop reason: HOSPADM

## 2024-09-29 RX ORDER — ACETAMINOPHEN 325 MG/1
650 TABLET ORAL EVERY 6 HOURS PRN
Status: DISCONTINUED | OUTPATIENT
Start: 2024-09-29 | End: 2024-10-02 | Stop reason: HOSPADM

## 2024-09-29 RX ORDER — MAGNESIUM SULFATE IN WATER 40 MG/ML
2000 INJECTION, SOLUTION INTRAVENOUS PRN
Status: DISCONTINUED | OUTPATIENT
Start: 2024-09-29 | End: 2024-10-02 | Stop reason: HOSPADM

## 2024-09-29 RX ORDER — ONDANSETRON 2 MG/ML
4 INJECTION INTRAMUSCULAR; INTRAVENOUS EVERY 6 HOURS PRN
Status: DISCONTINUED | OUTPATIENT
Start: 2024-09-29 | End: 2024-10-02 | Stop reason: HOSPADM

## 2024-09-29 RX ORDER — SODIUM CHLORIDE, SODIUM LACTATE, POTASSIUM CHLORIDE, AND CALCIUM CHLORIDE .6; .31; .03; .02 G/100ML; G/100ML; G/100ML; G/100ML
500 INJECTION, SOLUTION INTRAVENOUS ONCE
Status: COMPLETED | OUTPATIENT
Start: 2024-09-29 | End: 2024-09-29

## 2024-09-29 RX ORDER — SODIUM CHLORIDE 0.9 % (FLUSH) 0.9 %
5-40 SYRINGE (ML) INJECTION EVERY 12 HOURS SCHEDULED
Status: DISCONTINUED | OUTPATIENT
Start: 2024-09-29 | End: 2024-10-02 | Stop reason: HOSPADM

## 2024-09-29 RX ORDER — POLYETHYLENE GLYCOL 3350 17 G/17G
17 POWDER, FOR SOLUTION ORAL DAILY PRN
Status: DISCONTINUED | OUTPATIENT
Start: 2024-09-29 | End: 2024-10-02 | Stop reason: HOSPADM

## 2024-09-29 RX ORDER — INSULIN GLARGINE 100 [IU]/ML
5 INJECTION, SOLUTION SUBCUTANEOUS NIGHTLY
Status: DISCONTINUED | OUTPATIENT
Start: 2024-09-29 | End: 2024-09-30

## 2024-09-29 RX ORDER — MIRABEGRON 50 MG/1
50 TABLET, EXTENDED RELEASE ORAL DAILY
COMMUNITY

## 2024-09-29 RX ORDER — ESTRADIOL 0.1 MG/G
CREAM VAGINAL SEE ADMIN INSTRUCTIONS
COMMUNITY

## 2024-09-29 RX ORDER — GLUCAGON 1 MG/ML
1 KIT INJECTION PRN
Status: DISCONTINUED | OUTPATIENT
Start: 2024-09-29 | End: 2024-10-02 | Stop reason: HOSPADM

## 2024-09-29 RX ORDER — SODIUM CHLORIDE 0.9 % (FLUSH) 0.9 %
5-40 SYRINGE (ML) INJECTION PRN
Status: DISCONTINUED | OUTPATIENT
Start: 2024-09-29 | End: 2024-10-02 | Stop reason: HOSPADM

## 2024-09-29 RX ORDER — INSULIN LISPRO 100 [IU]/ML
0-4 INJECTION, SOLUTION INTRAVENOUS; SUBCUTANEOUS NIGHTLY
Status: DISCONTINUED | OUTPATIENT
Start: 2024-09-29 | End: 2024-10-02 | Stop reason: HOSPADM

## 2024-09-29 RX ADMIN — SODIUM CHLORIDE, POTASSIUM CHLORIDE, SODIUM LACTATE AND CALCIUM CHLORIDE 500 ML: 600; 310; 30; 20 INJECTION, SOLUTION INTRAVENOUS at 17:38

## 2024-09-29 RX ADMIN — INSULIN GLARGINE 5 UNITS: 100 INJECTION, SOLUTION SUBCUTANEOUS at 22:00

## 2024-09-29 RX ADMIN — SODIUM CHLORIDE: 9 INJECTION, SOLUTION INTRAVENOUS at 22:05

## 2024-09-29 RX ADMIN — INSULIN HUMAN 3 UNITS: 100 INJECTION, SOLUTION PARENTERAL at 17:38

## 2024-09-29 RX ADMIN — INSULIN LISPRO 4 UNITS: 100 INJECTION, SOLUTION INTRAVENOUS; SUBCUTANEOUS at 22:00

## 2024-09-29 RX ADMIN — SODIUM CHLORIDE, PRESERVATIVE FREE 10 ML: 5 INJECTION INTRAVENOUS at 22:02

## 2024-09-29 RX ADMIN — WATER 1000 MG: 1 INJECTION INTRAMUSCULAR; INTRAVENOUS; SUBCUTANEOUS at 17:41

## 2024-09-29 ASSESSMENT — LIFESTYLE VARIABLES
HOW MANY STANDARD DRINKS CONTAINING ALCOHOL DO YOU HAVE ON A TYPICAL DAY: PATIENT DOES NOT DRINK
HOW OFTEN DO YOU HAVE A DRINK CONTAINING ALCOHOL: NEVER

## 2024-09-29 NOTE — ED NOTES
How does patient ambulate?   []Low Fall Risk (ambulates by themselves without support)  [x]Stand by assist   []Contact Guard   []Front wheel walker  []Wheelchair   []Steady  []Bed bound  []History of Lower Extremity Amputation  []Unknown, did not assess in the emergency department   How does patient take pills?  [x]Whole with Water  []Crushed in applesauce  []Crushed in pudding  []Other  []Unknown no oral medications were given in the ED  Is patient alert?   [x]Alert  []Drowsy but responds to voice  []Doesn't respond to voice but responds to painful stimuli  []Unresponsive  Is patient oriented?   [x]To person  [x]To place  [x]To time  [x]To situation  []Confused  []Agitated  [x]Follows commands  If patient is disoriented or from a Skill Nursing Facility has family been notified of admission? No  []Yes   []No  Patient belongings?   []Cell phone  []Wallet   []Dentures  []Clothing  Any specific patient or family belongings/needs/dynamics?   Lives with son.   Miscellaneous comments/pending orders?  None     If there are any additional questions please reach out to the Emergency Department.           Willie Ontiveros RN  09/29/24 9967

## 2024-09-29 NOTE — ED PROVIDER NOTES
THE [facility]  EMERGENCY DEPARTMENT ENCOUNTER          ATTENDING PHYSICIAN NOTE       Date of evaluation: 9/29/2024    Chief Complaint     Hyperglycemia (Patient arriving to the ED from home for reports of hyperglycemia and weakness. Patient's sugar for EMS was 505, and was 380 in the ED)      History of Present Illness     Mildred Yusuf is a 89 y.o. female with past medical history of type 2 diabetes, hyperlipidemia, urinary incontinence and ED with complaint of fatigue and frequent falls.  Patient is cared for at home by her son.  She had a fall last week where she fell out of bed but did not suffer any acute injuries.  Today patient had an additional fall in her bathroom where she suffered a skin tear to her right forearm.  Son took her glucose at home which was elevated and he was concerned about frequent falls and her hyperglycemia.  Patient has complaints of generalized weakness, dysuria, and feeling fatigued in the emergency department.  She denies any other symptoms.  She denies head pain, neck pain, syncope, chest pain, shortness of breath, back pain, flank pain, abdominal pain, numbness, weakness, tingling her extremities    ASSESSMENT / PLAN  (MEDICAL DECISION MAKING)     INITIAL VITALS: BP: (!) 110/46, Temp: 97.8 °F (36.6 °C), Pulse: 80, Respirations: 16, SpO2: 95 %      Mildred Yusuf is a 89 y.o. female in ED with fatigue and frequent falls.  On my examination patient is alert and oriented x 3.  Extraocular muscles are intact and pupils are equal and reactive.  She is normocephalic and atraumatic.  She has no cervical, lumbar, thoracic spine tenderness to palpation.  She has no tenderness to palpation over her shoulders, elbows, hands, wrists, knees, hips, feet.  She is able to logroll and percussed her hips without difficulty.  Patient has no obvious traumatic injuries on my examination with the exception of a skin tear over her right dorsal forearm.  She is  weak generally and

## 2024-09-29 NOTE — ED NOTES
Pepper with tomatoes, lettuce, mustard, pickles, Fruit cup      Willie Ontiveros, RN  09/29/24 4816

## 2024-09-30 LAB
ALBUMIN SERPL-MCNC: 3 G/DL (ref 3.4–5)
ANION GAP SERPL CALCULATED.3IONS-SCNC: 9 MMOL/L (ref 3–16)
BASOPHILS # BLD: 0 K/UL (ref 0–0.2)
BASOPHILS NFR BLD: 0.3 %
BUN SERPL-MCNC: 17 MG/DL (ref 7–20)
CALCIUM SERPL-MCNC: 9.4 MG/DL (ref 8.3–10.6)
CHLORIDE SERPL-SCNC: 96 MMOL/L (ref 99–110)
CO2 SERPL-SCNC: 24 MMOL/L (ref 21–32)
CREAT SERPL-MCNC: 0.5 MG/DL (ref 0.6–1.2)
CREAT UR-MCNC: 62.2 MG/DL (ref 28–259)
DEPRECATED RDW RBC AUTO: 14.4 % (ref 12.4–15.4)
EKG ATRIAL RATE: 80 BPM
EKG DIAGNOSIS: NORMAL
EKG P AXIS: 86 DEGREES
EKG P-R INTERVAL: 252 MS
EKG Q-T INTERVAL: 370 MS
EKG QRS DURATION: 82 MS
EKG QTC CALCULATION (BAZETT): 426 MS
EKG R AXIS: 43 DEGREES
EKG T AXIS: 67 DEGREES
EKG VENTRICULAR RATE: 80 BPM
EOSINOPHIL # BLD: 0 K/UL (ref 0–0.6)
EOSINOPHIL NFR BLD: 0.2 %
EST. AVERAGE GLUCOSE BLD GHB EST-MCNC: 180 MG/DL
GFR SERPLBLD CREATININE-BSD FMLA CKD-EPI: 84 ML/MIN/{1.73_M2}
GLUCOSE BLD-MCNC: 188 MG/DL (ref 70–99)
GLUCOSE BLD-MCNC: 202 MG/DL (ref 70–99)
GLUCOSE BLD-MCNC: 206 MG/DL (ref 70–99)
GLUCOSE BLD-MCNC: 221 MG/DL (ref 70–99)
GLUCOSE SERPL-MCNC: 208 MG/DL (ref 70–99)
HBA1C MFR BLD: 7.9 %
HCT VFR BLD AUTO: 28.5 % (ref 36–48)
HGB BLD-MCNC: 9.6 G/DL (ref 12–16)
LYMPHOCYTES # BLD: 1.1 K/UL (ref 1–5.1)
LYMPHOCYTES NFR BLD: 8.6 %
MCH RBC QN AUTO: 32.3 PG (ref 26–34)
MCHC RBC AUTO-ENTMCNC: 33.8 G/DL (ref 31–36)
MCV RBC AUTO: 95.5 FL (ref 80–100)
MONOCYTES # BLD: 1.6 K/UL (ref 0–1.3)
MONOCYTES NFR BLD: 13.3 %
NEUTROPHILS # BLD: 9.5 K/UL (ref 1.7–7.7)
NEUTROPHILS NFR BLD: 77.6 %
PERFORMED ON: ABNORMAL
PHOSPHATE SERPL-MCNC: 2.3 MG/DL (ref 2.5–4.9)
PLATELET # BLD AUTO: 205 K/UL (ref 135–450)
PMV BLD AUTO: 6.8 FL (ref 5–10.5)
POTASSIUM SERPL-SCNC: 4.7 MMOL/L (ref 3.5–5.1)
RBC # BLD AUTO: 2.98 M/UL (ref 4–5.2)
SODIUM SERPL-SCNC: 129 MMOL/L (ref 136–145)
VIT B12 SERPL-MCNC: 476 PG/ML (ref 211–911)
WBC # BLD AUTO: 12.2 K/UL (ref 4–11)

## 2024-09-30 PROCEDURE — 97166 OT EVAL MOD COMPLEX 45 MIN: CPT

## 2024-09-30 PROCEDURE — 97535 SELF CARE MNGMENT TRAINING: CPT

## 2024-09-30 PROCEDURE — 51798 US URINE CAPACITY MEASURE: CPT

## 2024-09-30 PROCEDURE — 97530 THERAPEUTIC ACTIVITIES: CPT

## 2024-09-30 PROCEDURE — 6370000000 HC RX 637 (ALT 250 FOR IP): Performed by: INTERNAL MEDICINE

## 2024-09-30 PROCEDURE — 2580000003 HC RX 258

## 2024-09-30 PROCEDURE — 97163 PT EVAL HIGH COMPLEX 45 MIN: CPT

## 2024-09-30 PROCEDURE — 85025 COMPLETE CBC W/AUTO DIFF WBC: CPT

## 2024-09-30 PROCEDURE — 36415 COLL VENOUS BLD VENIPUNCTURE: CPT

## 2024-09-30 PROCEDURE — 6370000000 HC RX 637 (ALT 250 FOR IP)

## 2024-09-30 PROCEDURE — 6360000002 HC RX W HCPCS

## 2024-09-30 PROCEDURE — 1200000000 HC SEMI PRIVATE

## 2024-09-30 PROCEDURE — 80069 RENAL FUNCTION PANEL: CPT

## 2024-09-30 RX ORDER — ROSUVASTATIN CALCIUM 10 MG/1
10 TABLET, COATED ORAL NIGHTLY
Status: DISCONTINUED | OUTPATIENT
Start: 2024-09-30 | End: 2024-10-02 | Stop reason: HOSPADM

## 2024-09-30 RX ORDER — INSULIN GLARGINE 100 [IU]/ML
8 INJECTION, SOLUTION SUBCUTANEOUS NIGHTLY
Status: DISCONTINUED | OUTPATIENT
Start: 2024-09-30 | End: 2024-10-02 | Stop reason: HOSPADM

## 2024-09-30 RX ORDER — SODIUM CHLORIDE 9 MG/ML
INJECTION, SOLUTION INTRAVENOUS CONTINUOUS
Status: DISCONTINUED | OUTPATIENT
Start: 2024-09-30 | End: 2024-09-30

## 2024-09-30 RX ORDER — LANOLIN ALCOHOL/MO/W.PET/CERES
1000 CREAM (GRAM) TOPICAL DAILY
Status: DISCONTINUED | OUTPATIENT
Start: 2024-09-30 | End: 2024-10-02 | Stop reason: HOSPADM

## 2024-09-30 RX ORDER — TROSPIUM CHLORIDE 20 MG/1
20 TABLET, FILM COATED ORAL
Status: DISCONTINUED | OUTPATIENT
Start: 2024-09-30 | End: 2024-10-02 | Stop reason: HOSPADM

## 2024-09-30 RX ORDER — SPIRONOLACTONE 25 MG/1
25 TABLET ORAL DAILY
Status: DISCONTINUED | OUTPATIENT
Start: 2024-10-01 | End: 2024-10-02 | Stop reason: HOSPADM

## 2024-09-30 RX ADMIN — TROSPIUM CHLORIDE 20 MG: 20 TABLET, FILM COATED ORAL at 15:24

## 2024-09-30 RX ADMIN — ROSUVASTATIN CALCIUM 10 MG: 10 TABLET, FILM COATED ORAL at 21:51

## 2024-09-30 RX ADMIN — GABAPENTIN 400 MG: 400 CAPSULE ORAL at 21:51

## 2024-09-30 RX ADMIN — ACETAMINOPHEN 650 MG: 325 TABLET ORAL at 11:29

## 2024-09-30 RX ADMIN — Medication 250 MG: at 15:24

## 2024-09-30 RX ADMIN — INSULIN GLARGINE 8 UNITS: 100 INJECTION, SOLUTION SUBCUTANEOUS at 21:52

## 2024-09-30 RX ADMIN — ENOXAPARIN SODIUM 40 MG: 100 INJECTION SUBCUTANEOUS at 11:25

## 2024-09-30 RX ADMIN — CYANOCOBALAMIN TAB 1000 MCG 1000 MCG: 1000 TAB at 15:24

## 2024-09-30 RX ADMIN — INSULIN LISPRO 2 UNITS: 100 INJECTION, SOLUTION INTRAVENOUS; SUBCUTANEOUS at 17:36

## 2024-09-30 RX ADMIN — SODIUM CHLORIDE: 9 INJECTION, SOLUTION INTRAVENOUS at 08:36

## 2024-09-30 RX ADMIN — SODIUM CHLORIDE, PRESERVATIVE FREE 10 ML: 5 INJECTION INTRAVENOUS at 08:33

## 2024-09-30 RX ADMIN — WATER 1000 MG: 1 INJECTION INTRAMUSCULAR; INTRAVENOUS; SUBCUTANEOUS at 12:00

## 2024-09-30 RX ADMIN — SODIUM CHLORIDE, PRESERVATIVE FREE 10 ML: 5 INJECTION INTRAVENOUS at 21:51

## 2024-09-30 RX ADMIN — INSULIN LISPRO 2 UNITS: 100 INJECTION, SOLUTION INTRAVENOUS; SUBCUTANEOUS at 08:32

## 2024-09-30 ASSESSMENT — ENCOUNTER SYMPTOMS
RESPIRATORY NEGATIVE: 1
ALLERGIC/IMMUNOLOGIC NEGATIVE: 1
GASTROINTESTINAL NEGATIVE: 1
EYES NEGATIVE: 1

## 2024-09-30 ASSESSMENT — PAIN DESCRIPTION - DESCRIPTORS: DESCRIPTORS: ACHING;DISCOMFORT

## 2024-09-30 ASSESSMENT — PAIN DESCRIPTION - LOCATION: LOCATION: ARM

## 2024-09-30 ASSESSMENT — PAIN DESCRIPTION - ONSET: ONSET: ON-GOING

## 2024-09-30 ASSESSMENT — PAIN SCALES - GENERAL
PAINLEVEL_OUTOF10: 3
PAINLEVEL_OUTOF10: 0
PAINLEVEL_OUTOF10: 5

## 2024-09-30 ASSESSMENT — PAIN DESCRIPTION - PAIN TYPE: TYPE: ACUTE PAIN

## 2024-09-30 ASSESSMENT — PAIN - FUNCTIONAL ASSESSMENT: PAIN_FUNCTIONAL_ASSESSMENT: ACTIVITIES ARE NOT PREVENTED

## 2024-09-30 ASSESSMENT — PAIN DESCRIPTION - ORIENTATION: ORIENTATION: RIGHT

## 2024-09-30 ASSESSMENT — PAIN DESCRIPTION - FREQUENCY: FREQUENCY: INTERMITTENT

## 2024-09-30 NOTE — PROGRESS NOTES
Internal Medicine Progress Note    Patient Name: Mildred Yusuf   Patient : 1925   Date: 2024   Admit Date: 2024     CC: Hyperglycemia (Patient arriving to the ED from home for reports of hyperglycemia and weakness. Patient's sugar for EMS was 505, and was 380 in the ED)       Interval History     Patient complains of new right upper arm pain with no history of trauma.  bilateral lower limb pain consistent with previous history of diabetic neuropathy.  Will put her back on gabapentin and monitor for resolution of symptoms.  If patient still complains well do right arm x-ray.      ROS   Review of Systems   Constitutional: Negative.    HENT: Negative.     Eyes: Negative.    Respiratory: Negative.     Cardiovascular: Negative.    Gastrointestinal: Negative.    Endocrine: Negative.    Genitourinary:  Positive for dysuria. Negative for difficulty urinating, flank pain, hematuria and pelvic pain.   Musculoskeletal: Negative.         Right upper arm pain.  Bilateral lower limb pain.   Skin: Negative.    Allergic/Immunologic: Negative.    Neurological: Negative.    Hematological: Negative.    Psychiatric/Behavioral: Negative.            Objective     I/Os:  I/O last 3 completed shifts:  In: -   Out: 725 [Urine:725]      Vital Signs:  Patient Vitals for the past 8 hrs:   BP Temp Temp src Pulse Resp SpO2   24 1519 109/66 98.9 °F (37.2 °C) Oral 70 16 98 %   24 1143 114/68 99 °F (37.2 °C) Oral 77 14 97 %       Physical Exam:  Physical Exam  Vitals and nursing note reviewed. Exam conducted with a chaperone present.   Cardiovascular:      Rate and Rhythm: Normal rate and regular rhythm.      Pulses: Normal pulses.      Heart sounds: Normal heart sounds.   Pulmonary:      Effort: Pulmonary effort is normal.      Breath sounds: Normal breath sounds.   Abdominal:      General: Abdomen is flat. There is distension.      Palpations: There is no mass.      Tenderness: There is no abdominal    CREATININE 0.6 0.6  --   --  0.5*   GLUCOSE 365* 350*  --   --  208*   CALCIUM 10.0 9.5  --   --  9.4   MG  --   --  1.80  --   --    PHOS  --  2.7  --   --  2.3*   ANIONGAP 9 10  --   --  9       Hepatic: No results for input(s): \"AST\", \"ALT\", \"BILITOT\", \"BILIDIR\", \"LABALBU\", \"ALKPHOS\" in the last 72 hours.    Invalid input(s): \"PROT\"    Troponin:   Recent Labs     09/29/24  1606 09/29/24  1644   TROPHS 34* 31*         Pro-BNP: No results for input(s): \"PROBNP\" in the last 72 hours.    Lipids: No results for input(s): \"CHOL\", \"TRIG\", \"HDL\", \"VLDL\" in the last 72 hours.    Invalid input(s): \"LDLCALC\"      ABGs:  No results for input(s): \"PHART\", \"TLA3ITM\", \"PO2ART\", \"ZDA6MPG\", \"BEART\", \"THGBART\", \"T2BJDHTU\", \"KLE6PCZ\" in the last 72 hours.    VBGs: No results for input(s): \"PHVEN\", \"ZLS2MSZ\", \"PO2VEN\" in the last 72 hours.    INR: No results for input(s): \"INR\" in the last 72 hours.  aPTT: No results for input(s): \"APTT\" in the last 72 hours.    Procalcitonin: No results for input(s): \"PROCAL\" in the last 72 hours.  CRP: No results for input(s): \"CRP\" in the last 72 hours.  ESR: No results for input(s): \"SEDRATE\" in the last 72 hours.      Microbiology:  Results       Procedure Component Value Units Date/Time    Culture, Urine [[id number]]  (Abnormal) Collected: 09/29/24 1649    Order Status: Completed Specimen: Urine, clean catch Updated: 09/30/24 1429     Organism Klebsiella pneumoniae     Urine Culture, Routine --     >100,000 CFU/ml  Sensitivity to follow      Narrative:      ORDER#: [id number]                          ORDERED BY: BRENDA RUBI  SOURCE: Urine Clean Catch                  COLLECTED:  09/29/24 16:49  ANTIBIOTICS AT KEVIN.:                      RECEIVED :  09/29/24 18:08    COVID-19 & Influenza Combo [[id number]] Collected: 09/29/24 1644    Order Status: Completed Specimen: Nasopharyngeal Swab Updated: 09/29/24 1737     SARS-CoV-2 RNA, RT PCR NOT DETECTED     Comment: Not Detected results do not

## 2024-09-30 NOTE — PROGRESS NOTES
Physical Therapy  Facility/Department: [address]  Physical Therapy Initial Assessment    Name: Mildred Yusuf  : 1925  MRN: [id number]  Date of Service: 2024    Discharge Recommendations:  Subacute/Skilled Nursing Facility   PT Equipment Recommendations  Equipment Needed: No      Assessment  Assessment: Pt from home, admitted for hyperglycemia.  Pt is frail with gross decreased strength and decreased cognition.  Pt demonstrates decreased transfers and gait from reported baseline.  Pt fatgiues quickly in standing, unable to tolerate ambulation beyond bedside chair.  Currently needing Mod A overall with mobility.  Pt would benefit from ongoing skilled PT to increase strength and maximize mobility.  Should pt return home insteady, pt will need 24hr hands on capable assist and home PT to ensure safety.  Treatment Diagnosis: Decreased gait associated with hyperglycemia.  Decision Making: High Complexity  Requires PT Follow-Up: Yes    Plan  General Plan:  (2-5)  Current Treatment Recommendations: Transfer training, Functional mobility training, Strengthening, Gait training, Stair training, Safety education & training, Patient/Caregiver education & training    Safety Devices  Type of Devices: Left in chair, Chair alarm in place, Call light within reach, Nurse notified    Restrictions  Other position/activity restrictions: Up with assist     Subjective  Pain: Pt reports no pain, but winces and moans with mobility.  Unable to rate or describe pain.  RN aware.    Chart Reviewed: Yes  Additional Pertinent Hx: Pt to ED  with hyperglycemia and weakness.  PMH:  HTN, DM, UTI, anemia, OA, neuropathy, SPENCER, depression    Diagnosis: Hyperglycemia    Subjective  Subjective: Pt found supine in bed.  Pt agreeable for PT.  Noted delayed responses at times.    Social/Functional History  Lives With: Son  Type of Home: House  Home Layout: One level  Home Access: Stairs to enter without rails (2  steps)  Bathroom Shower/Tub: Walk-in shower  Bathroom Toilet: Standard  Bathroom Equipment: Shower chair, Grab bars in shower, Hand-held shower  Home Equipment: Rollator, Adjustable bed  Has the patient had two or more falls in the past year or any fall with injury in the past year?: Yes  ADL Assistance: Needs assistance (son assists with showers)  Homemaking Assistance:  (Son does homemaking)  Homemaking Responsibilities: No  Ambulation Assistance: Independent (using rollator)  Transfer Assistance: Independent  Active : No  Additional Comments: Pt is a poor historian.  Son is home all the time.  Pt reports no home care services.    Vision/Hearing  Vision: Within Functional Limits (wears glasses)  Hearing: Hard of hearing/hearing concerns;Bilateral hearing aid      Cognition   Orientation  Orientation Level: Oriented to place;Oriented to person;Disoriented to situation (Oriented to month, cueing needed for birthday)    Cognition  Overall Cognitive Status: Exceptions  Following Commands: Follows one step commands with increased time;Follows one step commands with repetition  Memory: Decreased short term memory;Decreased recall of recent events  Safety Judgement: Impaired  Problem Solving: Impaired  Insights: Impaired  Initiation: Impaired  Sequencing: Impaired    Objective  Gross Assessment  AROM: Within functional limits  Strength: Generally decreased, functional (B LEs)    Bed mobility  Supine to Sit: Moderate assistance (HOB raised, max verbal cues, increased time/effort)    Transfers  Sit to Stand: Moderate Assistance (to walker, pulls from walker frame with therapists providing stabilization)  Stand to Sit: Moderate Assistance (poor control to sit)  Bed to Chair: Minimal assistance;2 Person Assistance (stand step pivot with walker)    Ambulation  Device: Rolling Walker  Assistance: Minimal assistance;2 Person assistance  Quality of Gait: flexed posture, assist with walker management, unsteady gait  Gait

## 2024-09-30 NOTE — CONSULTS
Clinical Pharmacy Consult Note  Medication History     Admit Date: 2024    Pharmacy consulted to verify home medication list by Dr. Michelle Myrick.    List of current medications patient is taking is complete. Home Medication list in EPIC updated to reflect changes noted below.    Source of information: [facility] Clinical Summary generated 2024 & Outpatient Dispense Report    Patient's home pharmacy: [facility] #[id number] ([phone])     Changes made to medication list:   Medications removed:  Apixaban - no fill history  Furosemide - no fill history  Lactobacillus -  & no fill history  Medications added:   Estradiol 0.1 mg/1 g Cream: Insert 1 g vaginally daily x 2 weeks, then insert 0.5 g vaginally 2x/week  Losartan 100 m tab PO daily  Mirabegron 50 mg TB24: 1 tab PO daily  Naloxone 4mg/act: 1 spray/nostril PRN Opioid Reversal  Pioglitazone 30 m tab PO daily  Rosuvastatin 10 m tab PO daily  Spironolactone 25 m tab PO daily  Tramadol 50 m tab PO TID PRN Pain  Vitamin D3 25 mcg (1000 UT): 1 cap PO daily  Medication doses adjusted:   Cyanocobalamin changed to 25 mcg (1000 UT) Lozenges from 5000 UT  Magnesium PO changed to Magnesium Oxide 400 mg Capsules  Other notes:   Patient is poor historian, and requested that Pharmacy Intern call their Son, Bob Yusuf  ([phone]) - No Answer  Pharmacy Intern also called Daughter, Estephania Gordon ([phone]) - No Answer  Last Doses all marked as \"Past Week\" because they could not be confirmed with patient or family    Current Outpatient Medications   Medication Instructions    Biotin 10 MG CAPS 1 capsule, Oral, DAILY    Cyanocobalamin 1,000 mcg, SubLINGual, DAILY    estradiol (ESTRACE VAGINAL) 0.1 MG/GM vaginal cream SEE ADMIN INSTRUCTIONS, Insert 1 gram vaginally daily x 2 weeks, then insert 0.5 gram vaginally 2x/week.    gabapentin (NEURONTIN) 400 mg, Oral, 2 TIMES DAILY    losartan (COZAAR) 100 mg, Oral, DAILY    Magnesium

## 2024-09-30 NOTE — DISCHARGE INSTR - COC
Continuity of Care Form    Patient Name: Mildred Yusuf   :  1925  MRN:  [id number]    Admit date:  2024  Discharge date:  10/02/2024    Code Status Order: Limited   Advance Directives:   Advance Care Flowsheet Documentation             Admitting Physician:  Scout So MD  PCP: Mary Benitez MD    Discharging Nurse: Anisha Etienne RN  Discharging Hospital Unit/Room#: 6319/6319-01  Discharging Unit Phone Number: [phone]    Emergency Contact:   Extended Emergency Contact Information  Primary Emergency Contact: Bob Yusuf  Address: [address] [place] [address]  Home Phone: [phone]  Relation: Child  Secondary Emergency Contact: Estephania Gordon   [facility]  Home Phone: [phone]  Work Phone: [phone]  Mobile Phone: [phone]  Relation: Child    Past Surgical History:  Past Surgical History:   Procedure Laterality Date    CYSTOCELE REPAIR      HYSTERECTOMY (CERVIX STATUS UNKNOWN)      ovaries intact    RECTOCELE REPAIR      TOTAL KNEE ARTHROPLASTY         Immunization History:   Immunization History   Administered Date(s) Administered    COVID-19, PFIZER GRAY top, DO NOT Dilute, (age 12 y+), IM, 30 mcg/0.3 mL 2022    COVID-19, PFIZER PURPLE top, DILUTE for use, (age 12 y+), 30mcg/0.3mL 2021, 2021, 10/22/2021    Influenza Vaccine, unspecified formulation 2009, 2010, 2012, 2013, 10/09/2014, 12/10/2015, 2016, 2017    Influenza Virus Vaccine 2009    Influenza Whole 2010    Influenza, FLUZONE High Dose, (age 65 y+), IM, Trivalent PF, 0.5mL 2011, 2012, 2013, 10/09/2014    Pneumococcal, PCV-13, PREVNAR 13, (age 6w+), IM, 0.5mL 2015    Pneumococcal, PPSV23, PNEUMOVAX 23, (age 2y+), SC/IM, 0.5mL 2007    TDaP, ADACEL (age 10y-64y), BOOSTRIX (age 10y+), IM, 0.5mL 2003, 2022    Td, unspecified formulation  (Active)   Wound Etiology Pressure Stage 1 09/30/24 1519   Dressing Status New dressing applied;Clean;Dry;Intact 09/30/24 1519   Wound Cleansed Cleansed with saline 09/30/24 1519   Dressing/Treatment Foam 09/30/24 1519   Wound Assessment Non-blanchable erythema 09/30/24 1519   Drainage Amount None (dry) 09/30/24 1519   Odor None 09/30/24 1519   Number of days: 0       Wound 09/29/24 Heel Right unblanchable redness (Active)   Wound Etiology Pressure Stage 1 09/30/24 1519   Dressing Status New dressing applied;Clean;Dry;Intact 09/30/24 1519   Wound Cleansed Cleansed with saline 09/30/24 1519   Dressing/Treatment Foam 09/30/24 1519   Wound Assessment Non-blanchable erythema 09/30/24 1519   Drainage Amount None (dry) 09/30/24 1519   Odor None 09/30/24 1519   Number of days: 0       Wound 09/29/24 Radial Right bleeding skin  tear (Active)   Wound Etiology Skin Tear 09/30/24 1519   Dressing Status New dressing applied;Clean;Dry;Intact 09/30/24 1519   Wound Cleansed Cleansed with saline 09/30/24 1519   Dressing/Treatment Gauze dressing/dressing sponge 09/30/24 1519   Wound Assessment Bleeding 09/30/24 1519   Drainage Amount Small (< 25%) 09/30/24 1519   Drainage Description Serosanguinous 09/30/24 1519   Odor None 09/30/24 1519   Number of days: 0     Right Forearm Wound Care:  Clean with NS moisten gauze  Cover skin tear with silicone mesh - may stay in place for 5 days  Cover with non-adherent gauze and roll gauze  Change outer dressing as needed for drainage     Elimination:  Continence:   Bowel: Yes  Bladder: Yes  Urinary Catheter: None   Colostomy/Ileostomy/Ileal Conduit: No       Date of Last BM: ***    Intake/Output Summary (Last 24 hours) at 9/30/2024 1737  Last data filed at 9/30/2024 1337  Gross per 24 hour   Intake 240 ml   Output 750 ml   Net -510 ml     I/O last 3 completed shifts:  In: -   Out: 725 [Urine:725]    Safety Concerns:     History of Falls (last 30 days) and At Risk for

## 2024-09-30 NOTE — PROGRESS NOTES
Patient arrived to 6S on stretcher from ED. Pt A/O x4. BP soft but VS stable otherwise. LR bolus running through PIV. Telemetry monitor in place and skin assessed. Patient and son oriented to room and call light system. Pt left with call light in reach and plan of care ongoing.

## 2024-09-30 NOTE — PROGRESS NOTES
standing)  Toileting: Dependent/Total  Toileting Skilled Clinical Factors: incontinent of urine - using purewick; assist provided for hygiene and application of clean brief          Bed mobility  Supine to Sit: Moderate assistance (HOB raised, max verbal cues, increased time/effort)    Transfers  Stand Step Transfers: Minimal assistance;2 Person assistance (bed to chair transfer using RW)  Sit to stand: Moderate assistance (pulls up on RW)  Stand to sit: Moderate assistance (to control descent)    Vision  Vision: Within Functional Limits (wears glasses)  Hearing  Hearing: Exceptions to WFL  Hearing Exceptions: Hard of hearing/hearing concerns    Cognition  Overall Cognitive Status: Exceptions  Following Commands: Follows one step commands with increased time;Follows one step commands with repetition  Memory: Decreased short term memory;Decreased recall of recent events  Safety Judgement: Impaired  Problem Solving: Impaired  Insights: Impaired  Initiation: Impaired  Sequencing: Impaired  Orientation  Orientation Level: Oriented to place;Oriented to person;Disoriented to situation (Oriented to month, cueing needed for birthday)                    Education Given To: Patient  Education Provided: Role of Therapy;Plan of Care;ADL Adaptive Strategies;Transfer Training;Fall Prevention Strategies  Education Method: Verbal  Barriers to Learning: Hearing;Cognition  Education Outcome: Verbalized understanding;Continued education needed                     Pt seen by OT for eval and treat. Treatment included: bed mobility, functional transfer, ADL, pt education                                                    AM-PAC - ADL  AM-PAC Daily Activity - Inpatient   How much help is needed for putting on and taking off regular lower body clothing?: Total  How much help is needed for bathing (which includes washing, rinsing, drying)?: A Lot  How much help is needed for toileting (which includes using toilet, bedpan, or urinal)?:  Total  How much help is needed for putting on and taking off regular upper body clothing?: A Little  How much help is needed for taking care of personal grooming?: A Little  How much help for eating meals?: None  AM-PAC Inpatient Daily Activity Raw Score: 14  AM-PAC Inpatient ADL T-Scale Score : 33.39  ADL Inpatient CMS 0-100% Score: 59.67  ADL Inpatient CMS G-Code Modifier : CK           Goals  Short Term Goals  Time Frame for Short Term Goals: Discharge  Short Term Goal 1: BSC transfer w/ CGA  Short Term Goal 2: LB dressing w/ CGA  Short Term Goal 3: increase functional standing tolerance to 5 minutes  Patient Goals   Patient goals : to regain independence      Therapy Time   Individual Concurrent Group Co-treatment   Time In 0825         Time Out 0920         Minutes 55          Timed Code Treatment Minutes:   40    Total Treatment Minutes:  55         Bernadette Stark OTR/L #[id number]

## 2024-09-30 NOTE — CARE COORDINATION
Case Management Assessment  Initial Evaluation    Date/Time of Evaluation: 9/30/2024 5:33 PM  Assessment Completed by: Stacie Carbajal RN    If patient is discharged prior to next notation, then this note serves as note for discharge by case management.    Patient Name: Mildred Yusuf                   YOB: 1925  Diagnosis: Hyperglycemia [R73.9]  Generalized weakness [R53.1]  Acute cystitis with hematuria [N30.01]                   Date / Time: 9/29/2024  2:44 PM    Patient Admission Status: Inpatient   Readmission Risk (Low < 19, Mod (19-27), High > 27): Readmission Risk Score: 15.3    Current PCP: Mary Benitez MD  PCP verified by ? Yes    Chart Reviewed: Yes      History Provided by: Patient  Patient Orientation: Alert and Oriented, Person, Place    Patient Cognition: Alert    Hospitalization in the last 30 days (Readmission):  No    If yes, Readmission Assessment in  Navigator will be completed.    Advance Directives:      Code Status: Limited   Patient's Primary Decision Maker is: Legal Next of Kin      Discharge Planning:    Patient lives with: Children Type of Home: House  Primary Care Giver: Self  Patient Support Systems include: Children   Current Financial resources: Medicare  Current community resources: ECF/Home Care  Current services prior to admission: None            Current DME:              Type of Home Care services:  PT, OT, Aide Services, Skilled Therapy    ADLS  Prior functional level: Independent in ADLs/IADLs  Current functional level: Independent in ADLs/IADLs    PT AM-PAC: 10 /24  OT AM-PAC: 14 /24    Family can provide assistance at DC: Yes  Would you like Case Management to discuss the discharge plan with any other family members/significant others, and if so, who? No  Plans to Return to Present Housing: Unknown at present  Other Identified Issues/Barriers to RETURNING to current housing: safety  Potential Assistance needed at discharge: Home Care, Skilled  Nursing Facility            Potential DME:    Patient expects to discharge to: Skilled nursing facility  Plan for transportation at discharge: Self    Financial    Payor: MEDICARE / Plan: MEDICARE PART A AND B / Product Type: *No Product type* /     Does insurance require precert for SNF: No    Potential assistance Purchasing Medications: No  Meds-to-Beds request:        [facility] [id number] - [place], [place] - [address] - P [phone] - F [phone]  [address] [place] [address]  Phone: [phone] Fax: [phone]      Notes:    Factors facilitating achievement of predicted outcomes: Family support, Cooperative, and Pleasant    Barriers to discharge: Pain    Additional Case Management Notes:     Cm  following for  d/c planning;    CM  spoke with  pt  son Sam  who she  lives  with and was here to visit .   Patient lives with so who is there  all the  time , and has  DME as  noted  above  , no Current  services  .      PTOT  shruthi  noted .  And  d/w  so  who would like to d/w his sister  Estephania.     [facility]  and that  would be their  preference  /choice at this time ,     CM  fax ed referral and  will follow up with pt and family in AM  and on bed availability :  No pre cert  needed  .     The Plan for Transition of Care is related to the following treatment goals of Hyperglycemia [R73.9]  Generalized weakness [R53.1]  Acute cystitis with hematuria [N30.01]    IF APPLICABLE: The Patient and/or patient representative Mildred and her family were provided with a choice of provider and agrees with the discharge plan. Freedom of choice list with basic dialogue that supports the patient's individualized plan of care/goals and shares the quality data associated with the providers was provided to: Patient   Patient Representative Name:       The Patient and/or Patient Representative Agree with the Discharge Plan? Yes    Stacie Carbajal RN  Case

## 2024-09-30 NOTE — PROGRESS NOTES
4 Eyes Skin Assessment     NAME:  Mildred Yusuf  YOB: 1925  MEDICAL RECORD NUMBER:  [id number]    The patient is being assessed for  Admission    I agree that at least one RN has performed a thorough Head to Toe Skin Assessment on the patient. ALL assessment sites listed below have been assessed.      Areas assessed by both nurses:    Head, Face, Ears, Shoulders, Back, Chest, Arms, Elbows, Hands, Sacrum. Buttock, Coccyx, Ischium, Legs. Feet and Heels, Under Medical Devices , and Other          Does the Patient have a Wound? Yes wound(s) were present on assessment. LDA wound assessment was Initiated and completed by RN  Bilateral heel wounds noted along with skin tear to left forearm from a fall at home       [place] Prevention initiated by RN: Yes  Wound Care Orders initiated by RN: Yes    Pressure Injury (Stage 3,4, Unstageable, DTI, NWPT, and Complex wounds) if present, place Wound referral order by RN under : No    New Ostomies, if present place, Ostomy referral order under : No     Nurse 1 eSignature: Electronically signed by Alyson Huston LPN on 9/30/24 at 1:22 AM EDT    **SHARE this note so that the co-signing nurse can place an eSignature**    Nurse 2 eSignature: {Esignature:[id number]}

## 2024-10-01 LAB
ALBUMIN SERPL-MCNC: 3.2 G/DL (ref 3.4–5)
ANION GAP SERPL CALCULATED.3IONS-SCNC: 7 MMOL/L (ref 3–16)
BACTERIA UR CULT: ABNORMAL
BASOPHILS # BLD: 0.1 K/UL (ref 0–0.2)
BASOPHILS NFR BLD: 1 %
BUN SERPL-MCNC: 13 MG/DL (ref 7–20)
CALCIUM SERPL-MCNC: 9.6 MG/DL (ref 8.3–10.6)
CHLORIDE SERPL-SCNC: 99 MMOL/L (ref 99–110)
CO2 SERPL-SCNC: 25 MMOL/L (ref 21–32)
CREAT SERPL-MCNC: 0.5 MG/DL (ref 0.6–1.2)
DEPRECATED RDW RBC AUTO: 14.3 % (ref 12.4–15.4)
EOSINOPHIL # BLD: 0.1 K/UL (ref 0–0.6)
EOSINOPHIL NFR BLD: 0.7 %
GFR SERPLBLD CREATININE-BSD FMLA CKD-EPI: 84 ML/MIN/{1.73_M2}
GLUCOSE BLD-MCNC: 183 MG/DL (ref 70–99)
GLUCOSE BLD-MCNC: 225 MG/DL (ref 70–99)
GLUCOSE BLD-MCNC: 267 MG/DL (ref 70–99)
GLUCOSE BLD-MCNC: 268 MG/DL (ref 70–99)
GLUCOSE SERPL-MCNC: 191 MG/DL (ref 70–99)
HCT VFR BLD AUTO: 30.7 % (ref 36–48)
HGB BLD-MCNC: 10.6 G/DL (ref 12–16)
LYMPHOCYTES # BLD: 0.9 K/UL (ref 1–5.1)
LYMPHOCYTES NFR BLD: 11.3 %
MCH RBC QN AUTO: 32.4 PG (ref 26–34)
MCHC RBC AUTO-ENTMCNC: 34.5 G/DL (ref 31–36)
MCV RBC AUTO: 94.1 FL (ref 80–100)
MONOCYTES # BLD: 0.8 K/UL (ref 0–1.3)
MONOCYTES NFR BLD: 9.3 %
NEUTROPHILS # BLD: 6.5 K/UL (ref 1.7–7.7)
NEUTROPHILS NFR BLD: 77.7 %
ORGANISM: ABNORMAL
PERFORMED ON: ABNORMAL
PHOSPHATE SERPL-MCNC: 2.8 MG/DL (ref 2.5–4.9)
PLATELET # BLD AUTO: 288 K/UL (ref 135–450)
PMV BLD AUTO: 7.2 FL (ref 5–10.5)
POTASSIUM SERPL-SCNC: 4.6 MMOL/L (ref 3.5–5.1)
RBC # BLD AUTO: 3.26 M/UL (ref 4–5.2)
SODIUM SERPL-SCNC: 131 MMOL/L (ref 136–145)
WBC # BLD AUTO: 8.4 K/UL (ref 4–11)

## 2024-10-01 PROCEDURE — 6370000000 HC RX 637 (ALT 250 FOR IP)

## 2024-10-01 PROCEDURE — 6370000000 HC RX 637 (ALT 250 FOR IP): Performed by: INTERNAL MEDICINE

## 2024-10-01 PROCEDURE — 6360000002 HC RX W HCPCS

## 2024-10-01 PROCEDURE — 80069 RENAL FUNCTION PANEL: CPT

## 2024-10-01 PROCEDURE — 36415 COLL VENOUS BLD VENIPUNCTURE: CPT

## 2024-10-01 PROCEDURE — 2580000003 HC RX 258

## 2024-10-01 PROCEDURE — 85025 COMPLETE CBC W/AUTO DIFF WBC: CPT

## 2024-10-01 PROCEDURE — 51798 US URINE CAPACITY MEASURE: CPT

## 2024-10-01 PROCEDURE — 1200000000 HC SEMI PRIVATE

## 2024-10-01 RX ORDER — BACITRACIN ZINC AND POLYMYXIN B SULFATE 500; 10000 [USP'U]/G; [USP'U]/G
OINTMENT OPHTHALMIC 3 TIMES DAILY
Status: DISCONTINUED | OUTPATIENT
Start: 2024-10-01 | End: 2024-10-02 | Stop reason: HOSPADM

## 2024-10-01 RX ORDER — MECOBALAMIN 5000 MCG
5 TABLET,DISINTEGRATING ORAL NIGHTLY PRN
Status: DISCONTINUED | OUTPATIENT
Start: 2024-10-01 | End: 2024-10-02 | Stop reason: HOSPADM

## 2024-10-01 RX ORDER — CEFDINIR 300 MG/1
300 CAPSULE ORAL EVERY 12 HOURS SCHEDULED
Status: DISCONTINUED | OUTPATIENT
Start: 2024-10-02 | End: 2024-10-02

## 2024-10-01 RX ORDER — CEFDINIR 300 MG/1
300 CAPSULE ORAL EVERY 12 HOURS SCHEDULED
Qty: 5 CAPSULE | Refills: 0 | Status: SHIPPED | OUTPATIENT
Start: 2024-10-02 | End: 2024-10-02

## 2024-10-01 RX ADMIN — SPIRONOLACTONE 25 MG: 25 TABLET ORAL at 09:02

## 2024-10-01 RX ADMIN — GABAPENTIN 400 MG: 400 CAPSULE ORAL at 21:25

## 2024-10-01 RX ADMIN — WATER 1000 MG: 1 INJECTION INTRAMUSCULAR; INTRAVENOUS; SUBCUTANEOUS at 09:02

## 2024-10-01 RX ADMIN — TROSPIUM CHLORIDE 20 MG: 20 TABLET, FILM COATED ORAL at 15:14

## 2024-10-01 RX ADMIN — CYANOCOBALAMIN TAB 1000 MCG 1000 MCG: 1000 TAB at 09:02

## 2024-10-01 RX ADMIN — INSULIN GLARGINE 8 UNITS: 100 INJECTION, SOLUTION SUBCUTANEOUS at 21:25

## 2024-10-01 RX ADMIN — SODIUM CHLORIDE, PRESERVATIVE FREE 10 ML: 5 INJECTION INTRAVENOUS at 21:26

## 2024-10-01 RX ADMIN — INSULIN LISPRO 4 UNITS: 100 INJECTION, SOLUTION INTRAVENOUS; SUBCUTANEOUS at 15:13

## 2024-10-01 RX ADMIN — INSULIN LISPRO 4 UNITS: 100 INJECTION, SOLUTION INTRAVENOUS; SUBCUTANEOUS at 18:51

## 2024-10-01 RX ADMIN — SODIUM CHLORIDE, PRESERVATIVE FREE 10 ML: 5 INJECTION INTRAVENOUS at 09:02

## 2024-10-01 RX ADMIN — BACITRACIN ZINC AND POLYMYXIN B SULFATE: 500; 10000 OINTMENT OPHTHALMIC at 21:44

## 2024-10-01 RX ADMIN — GABAPENTIN 400 MG: 400 CAPSULE ORAL at 09:02

## 2024-10-01 RX ADMIN — BACITRACIN ZINC AND POLYMYXIN B SULFATE: 500; 10000 OINTMENT OPHTHALMIC at 18:50

## 2024-10-01 RX ADMIN — ENOXAPARIN SODIUM 40 MG: 100 INJECTION SUBCUTANEOUS at 08:59

## 2024-10-01 RX ADMIN — TROSPIUM CHLORIDE 20 MG: 20 TABLET, FILM COATED ORAL at 05:23

## 2024-10-01 RX ADMIN — ROSUVASTATIN CALCIUM 10 MG: 10 TABLET, FILM COATED ORAL at 21:25

## 2024-10-01 ASSESSMENT — ENCOUNTER SYMPTOMS
EYES NEGATIVE: 1
ALLERGIC/IMMUNOLOGIC NEGATIVE: 1
RESPIRATORY NEGATIVE: 1
GASTROINTESTINAL NEGATIVE: 1

## 2024-10-01 NOTE — CARE COORDINATION
CM following for  dc planning:    Patient  and  family are  interested in SNF  and  referral was faxed to  Josie .      CM   called Amanda in admissions this am [phone]  ,  left  VM  requesting call back to  confirm if bed available and if she can accept the  pt .  No Pre cert needed  .      Electronically signed by Stacie Carbajal RN on 10/1/2024 at 9:37 AM       Stacie Carbajal RN Case Manager  [facility] [address]  [place] [id number] [phone]  Fax [phone]

## 2024-10-01 NOTE — PLAN OF CARE
Problem: Skin/Tissue Integrity  Goal: Absence of new skin breakdown  Description: 1.  Monitor for areas of redness and/or skin breakdown  2.  Assess vascular access sites hourly  3.  Every 4-6 hours minimum:  Change oxygen saturation probe site  4.  Every 4-6 hours:  If on nasal continuous positive airway pressure, respiratory therapy assess nares and determine need for appliance change or resting period.  Outcome: Progressing     Problem: ABCDS Injury Assessment  Goal: Absence of physical injury  Outcome: Progressing     Problem: Safety - Adult  Goal: Free from fall injury  Outcome: Progressing     Problem: Discharge Planning  Goal: Discharge to home or other facility with appropriate resources  Outcome: Progressing

## 2024-10-01 NOTE — PROGRESS NOTES
Internal Medicine Progress Note    Patient Name: Mildred Yusuf   Patient : 1925   Date: 10/1/2024   Admit Date: 2024     CC: Hyperglycemia (Patient arriving to the ED from home for reports of hyperglycemia and weakness. Patient's sugar for EMS was 505, and was 380 in the ED)       Interval History     Patient complains of lower abdominal pain.  Abdominal distention since admission.  Bladder scan does not show significant urinary retention.  Will follow-up bladder scans every shift.  Patient initially presented with dysuria.  Urine culture positive for Klebsiella.  Receiving ceftriaxone.    ROS   Review of Systems   Constitutional: Negative.    HENT: Negative.     Eyes: Negative.    Respiratory: Negative.     Cardiovascular: Negative.    Gastrointestinal: Negative.    Endocrine: Negative.    Genitourinary:  Positive for dysuria. Negative for difficulty urinating, flank pain, hematuria and pelvic pain.   Musculoskeletal: Negative.         Right upper arm pain.  Bilateral lower limb pain.   Skin: Negative.    Allergic/Immunologic: Negative.    Neurological: Negative.    Hematological: Negative.    Psychiatric/Behavioral: Negative.            Objective     I/Os:  I/O last 3 completed shifts:  In: 660 [P.O.:660]  Out: 1775 [Urine:1775]      Vital Signs:  Patient Vitals for the past 8 hrs:   BP Temp Temp src Pulse Resp SpO2   10/01/24 0344 128/68 97.7 °F (36.5 °C) Oral 98 18 94 %   10/01/24 0003 132/65 98 °F (36.7 °C) Oral 60 18 98 %       Physical Exam:  Physical Exam  Vitals and nursing note reviewed. Exam conducted with a chaperone present.   Cardiovascular:      Rate and Rhythm: Normal rate and regular rhythm.      Pulses: Normal pulses.      Heart sounds: Normal heart sounds.   Pulmonary:      Effort: Pulmonary effort is normal.      Breath sounds: Normal breath sounds.   Abdominal:      General: Abdomen is flat. There is distension.      Palpations: There is no mass.      Tenderness: There

## 2024-10-01 NOTE — CARE COORDINATION
CM following for  d/c planning:    CM recv'd  message from [facility] [phone], that they can  accept the pt and will have a bed available  tomorrow.    [facility]  Services: Skilled Nursing  [address] [place] [address] [id number] [phone]     REPORT:  [phone]  FAX:  [phone]    Electronically signed by Stacie Carbajal RN on 10/1/2024 at 11:06 AM         Stacie Carbajal RN Case Manager  [facility] [id number]  [facility] [id number] [phone]  Fax [phone]

## 2024-10-02 VITALS
RESPIRATION RATE: 16 BRPM | HEART RATE: 80 BPM | OXYGEN SATURATION: 96 % | WEIGHT: 133.38 LBS | BODY MASS INDEX: 22.22 KG/M2 | DIASTOLIC BLOOD PRESSURE: 73 MMHG | SYSTOLIC BLOOD PRESSURE: 151 MMHG | HEIGHT: 65 IN | TEMPERATURE: 98.4 F

## 2024-10-02 LAB
ALBUMIN SERPL-MCNC: 2.9 G/DL (ref 3.4–5)
ANION GAP SERPL CALCULATED.3IONS-SCNC: 13 MMOL/L (ref 3–16)
BASOPHILS # BLD: 0 K/UL (ref 0–0.2)
BASOPHILS NFR BLD: 0.6 %
BUN SERPL-MCNC: 13 MG/DL (ref 7–20)
CALCIUM SERPL-MCNC: 9.3 MG/DL (ref 8.3–10.6)
CHLORIDE SERPL-SCNC: 96 MMOL/L (ref 99–110)
CO2 SERPL-SCNC: 25 MMOL/L (ref 21–32)
CREAT SERPL-MCNC: <0.5 MG/DL (ref 0.6–1.2)
DEPRECATED RDW RBC AUTO: 14.5 % (ref 12.4–15.4)
EOSINOPHIL # BLD: 0.1 K/UL (ref 0–0.6)
EOSINOPHIL NFR BLD: 1.8 %
GFR SERPLBLD CREATININE-BSD FMLA CKD-EPI: 84 ML/MIN/{1.73_M2}
GLUCOSE BLD-MCNC: 195 MG/DL (ref 70–99)
GLUCOSE BLD-MCNC: 286 MG/DL (ref 70–99)
GLUCOSE SERPL-MCNC: 192 MG/DL (ref 70–99)
HCT VFR BLD AUTO: 29.8 % (ref 36–48)
HGB BLD-MCNC: 10.3 G/DL (ref 12–16)
LYMPHOCYTES # BLD: 1.1 K/UL (ref 1–5.1)
LYMPHOCYTES NFR BLD: 16 %
MCH RBC QN AUTO: 32.3 PG (ref 26–34)
MCHC RBC AUTO-ENTMCNC: 34.6 G/DL (ref 31–36)
MCV RBC AUTO: 93.5 FL (ref 80–100)
MONOCYTES # BLD: 0.8 K/UL (ref 0–1.3)
MONOCYTES NFR BLD: 11.1 %
NEUTROPHILS # BLD: 4.9 K/UL (ref 1.7–7.7)
NEUTROPHILS NFR BLD: 70.5 %
PERFORMED ON: ABNORMAL
PERFORMED ON: ABNORMAL
PHOSPHATE SERPL-MCNC: 2.6 MG/DL (ref 2.5–4.9)
PLATELET # BLD AUTO: 295 K/UL (ref 135–450)
PMV BLD AUTO: 7 FL (ref 5–10.5)
POTASSIUM SERPL-SCNC: 4.1 MMOL/L (ref 3.5–5.1)
RBC # BLD AUTO: 3.18 M/UL (ref 4–5.2)
SODIUM SERPL-SCNC: 134 MMOL/L (ref 136–145)
WBC # BLD AUTO: 6.9 K/UL (ref 4–11)

## 2024-10-02 PROCEDURE — 6370000000 HC RX 637 (ALT 250 FOR IP): Performed by: INTERNAL MEDICINE

## 2024-10-02 PROCEDURE — 51798 US URINE CAPACITY MEASURE: CPT

## 2024-10-02 PROCEDURE — 97530 THERAPEUTIC ACTIVITIES: CPT

## 2024-10-02 PROCEDURE — 6360000002 HC RX W HCPCS

## 2024-10-02 PROCEDURE — 97116 GAIT TRAINING THERAPY: CPT

## 2024-10-02 PROCEDURE — 85025 COMPLETE CBC W/AUTO DIFF WBC: CPT

## 2024-10-02 PROCEDURE — 80069 RENAL FUNCTION PANEL: CPT

## 2024-10-02 PROCEDURE — 2580000003 HC RX 258

## 2024-10-02 PROCEDURE — 97535 SELF CARE MNGMENT TRAINING: CPT

## 2024-10-02 PROCEDURE — 36415 COLL VENOUS BLD VENIPUNCTURE: CPT

## 2024-10-02 PROCEDURE — 6370000000 HC RX 637 (ALT 250 FOR IP)

## 2024-10-02 RX ORDER — BACITRACIN ZINC AND POLYMYXIN B SULFATE 500; 10000 [USP'U]/G; [USP'U]/G
OINTMENT OPHTHALMIC
DISCHARGE
Start: 2024-10-02 | End: 2024-10-12

## 2024-10-02 RX ORDER — MECOBALAMIN 5000 MCG
5 TABLET,DISINTEGRATING ORAL NIGHTLY PRN
DISCHARGE
Start: 2024-10-02

## 2024-10-02 RX ORDER — CEFDINIR 300 MG/1
300 CAPSULE ORAL EVERY 12 HOURS SCHEDULED
Status: DISCONTINUED | OUTPATIENT
Start: 2024-10-02 | End: 2024-10-02 | Stop reason: HOSPADM

## 2024-10-02 RX ORDER — CEFDINIR 300 MG/1
300 CAPSULE ORAL EVERY 12 HOURS SCHEDULED
DISCHARGE
Start: 2024-10-02 | End: 2024-10-13

## 2024-10-02 RX ADMIN — CEFDINIR 300 MG: 300 CAPSULE ORAL at 08:59

## 2024-10-02 RX ADMIN — SODIUM CHLORIDE, PRESERVATIVE FREE 10 ML: 5 INJECTION INTRAVENOUS at 08:59

## 2024-10-02 RX ADMIN — SPIRONOLACTONE 25 MG: 25 TABLET ORAL at 08:59

## 2024-10-02 RX ADMIN — GABAPENTIN 400 MG: 400 CAPSULE ORAL at 08:59

## 2024-10-02 RX ADMIN — INSULIN LISPRO 4 UNITS: 100 INJECTION, SOLUTION INTRAVENOUS; SUBCUTANEOUS at 13:57

## 2024-10-02 RX ADMIN — BACITRACIN ZINC AND POLYMYXIN B SULFATE: 500; 10000 OINTMENT OPHTHALMIC at 09:00

## 2024-10-02 RX ADMIN — TROSPIUM CHLORIDE 20 MG: 20 TABLET, FILM COATED ORAL at 05:32

## 2024-10-02 RX ADMIN — CYANOCOBALAMIN TAB 1000 MCG 1000 MCG: 1000 TAB at 08:59

## 2024-10-02 RX ADMIN — ENOXAPARIN SODIUM 40 MG: 100 INJECTION SUBCUTANEOUS at 09:00

## 2024-10-02 NOTE — PLAN OF CARE
Problem: Skin/Tissue Integrity  Goal: Absence of new skin breakdown  Description: 1.  Monitor for areas of redness and/or skin breakdown  2.  Assess vascular access sites hourly  3.  Every 4-6 hours minimum:  Change oxygen saturation probe site  4.  Every 4-6 hours:  If on nasal continuous positive airway pressure, respiratory therapy assess nares and determine need for appliance change or resting period.  Outcome: Completed     Problem: ABCDS Injury Assessment  Goal: Absence of physical injury  Outcome: Completed     Problem: Safety - Adult  Goal: Free from fall injury  Outcome: Completed     Problem: Discharge Planning  Goal: Discharge to home or other facility with appropriate resources  Outcome: Completed     Problem: Pain  Goal: Verbalizes/displays adequate comfort level or baseline comfort level  Outcome: Completed     Problem: Chronic Conditions and Co-morbidities  Goal: Patient's chronic conditions and co-morbidity symptoms are monitored and maintained or improved  Outcome: Completed

## 2024-10-02 NOTE — CARE COORDINATION
Case Management Assessment            Discharge Note                    Date / Time of Note: 10/2/2024 9:48 AM                  Discharge Note Completed by: Stacie Carbajal RN    Patient Name: Mildred Yusuf   YOB: 1925  Diagnosis: Hyperglycemia [R73.9]  Generalized weakness [R53.1]  Acute cystitis with hematuria [N30.01]   Date / Time: 9/29/2024  2:44 PM    Current PCP: Mary Benitez MD  Clinic patient: No    Hospitalization in the last 30 days: No       Advance Directives:  Code Status: Limited  [place] DNR form completed and on chart: Yes    Financial:  Payor: MEDICARE / Plan: MEDICARE PART A AND B / Product Type: *No Product type* /      Pharmacy:    [facility] [id number] - [place], [place] - [address] - P [phone] - F [phone]  [address] [place] [address]  Phone: [phone] Fax: [phone]      Assistance purchasing medications?: Potential Assistance Purchasing Medications: No  Assistance provided by Case Management: None at this time    Does patient want to participate in local refill/ meds to beds program?: No    Meds To Beds General Rules:  1. Can ONLY be done Monday- Friday between 8:30am-5pm  2. Prescription(s) must be in pharmacy by 3pm to be filled same day  3.Copy of patient's insurance/ prescription drug card and patient face sheet must be sent along with the prescription(s)  4. Cost of Rx cannot be added to hospital bill. If financial assistance is needed, please contact unit  or ;  or  CANNOT provide pharmacy voucher for patients co-pays  5. Patients can then  the prescription on their way out of the hospital at discharge, or pharmacy can deliver to the bedside if staff is available. (payment due at time of pick-up or delivery - cash, check, or card accepted)     Able to afford home medications/ co-pay costs: Yes    ADLS:  Current PT AM-PAC Score: 10 /24  Current OT AM-PAC Score: 14  defer    Home Oxygen and Respiratory Equipment:  Oxygen needed at discharge?: No  Home Oxygen Company: Not Applicable  Portable tank available for discharge?: No    Dialysis:  Dialysis patient: No    Dialysis Center:  Not Applicable    Hospice Services:  Location: Not Applicable  Agency: Not Applicable    Consents signed: Not Indicated    Referrals made at DISCHARGE for outpatient continued care:  Not Applicable    Additional CM Notes:     CM  confirmed d/c  to SNF as  noted  above  , Pt and  family in agreement  ,  RN to call report      CM to fax ANUP  /AVS  ,     COVID Result:    Lab Results   Component Value Date/Time    COVID19 NOT DETECTED 09/29/2024 04:44 PM       The Plan for Transition of Care is related to the following treatment goals of Hyperglycemia [R73.9]  Generalized weakness [R53.1]  Acute cystitis with hematuria [N30.01]    The Patient and/or patient representative Mildred and her family were provided with a choice of provider and agrees with the discharge plan Yes    Freedom of choice list was provided with basic dialogue that supports the patient's individualized plan of care/goals and shares the quality data associated with the providers. Yes    Care Transitions patient: No    Stacie Carbajal RN  [facility]  Case Management Department  Ph: [phone]

## 2024-10-02 NOTE — PROGRESS NOTES
Physical Therapy  Daily Treatment Note    Discharge Recommendation:  Skilled Nursing Facility  Equipment Needs:  Defer to next level of care    Assessment:  Mobility slow, weak and effortful. Needing up to 2 person assist for safety at this time. Limited tolerance in standing. Pt with good effort and participation overall. Requires frequent rest breaks due to fatigue. From home with son. History of recent falls. Pt currently functioning well below her baseline. Only tolerating a few steps with walker this session. Would benefit from continued IP PT at D/C prior to returning home. Plan is for SNF.     Chart Reviewed: Yes     Other position/activity restrictions: Up with assist   Additional Pertinent Hx: Pt to ED 9/29 with hyperglycemia and weakness.  PMH:  HTN, DM, UTI, anemia, OA, neuropathy, SPENCER, depression      Diagnosis: Hyperglycemia   Treatment Diagnosis: Decreased gait associated with hyperglycemia.    Subjective: Pt in bed initially. Agreeable to working with therapy.   States she's feeling weaker than her baseline. \"I can usually dress myself.\"   \"I'm 89 years old, you know.\" \"I've had a good life. A lot of fun.\"    Pain: None at rest. Some c/o R UE discomfort with activity, but reports it is better than yesterday.     Objective:    Bed mobility  Supine to sit: Mod assist x 1, HOB up partially.   Scooting: CGA to EOB. Slow and effortful.     Transfers  Sit to stand: Dependent (Mod assist x 1 + Min assist x 1) from bed; Dependent (Mod assist x 1 + Min assist x 1) from chair (x 3 times). Cues for hand placement.   Stand to sit: Dependent (Mod assist x 2) into chair (x 4 times). Decreased eccentric control. Cues for hand placement.     Ambulation  Assistance Level: Dependent (Mod assist x 1 + Min assist x 1)  Assistive device: Rolling walker  Distance: 2 ft (bed to chair)  Quality of gait: Weak; effortful; unsteady; decreased step length/height; narrow STEPHANIE; flexed posture  Other: Needing cues to back up

## 2024-10-02 NOTE — PROGRESS NOTES
Pt has a skin tear on right forearm with skin removal. Removed dressing and placed new dressing. Covered wound with silicone mesh, non-adherent gauze and roll gauze. ANUP updated with wound care orders.

## 2024-10-02 NOTE — PROGRESS NOTES
Occupational Therapy  Facility/Department: [address] TELEMETRY  Daily Treatment Note  NAME: Mildred Yusuf  : 1925  MRN: [id number]    Date of Service: 10/2/2024    Discharge Recommendations:  Subacute/Skilled Nursing Facility  OT Equipment Recommendations  Equipment Needed: No  Other: defer recommendations to discharge faciltiy      Patient Diagnosis(es): The primary encounter diagnosis was Acute cystitis with hematuria. Diagnoses of Generalized weakness and Hyperglycemia were also pertinent to this visit.     Assessment       Assessment: Pt tolerated session well, but limited by fatigue and endurance. Still requires 2 person assist for safety during transfers and funct mob with RW and standing level ADLs. Also still needs Total A for LB ADLs and toileting especially during standing parts. Would benefit from cont OT to work on ADLs, funct mob and transfers. Cont with POC      Activity Tolerance: Patient tolerated treatment well;Patient limited by fatigue  Discharge Recommendations: Subacute/Skilled Nursing Facility  Equipment Needed: No  Other: defer recommendations to discharge faciltiy     Plan  Occupational Therapy Plan  Times Per Week: 2-5    Restrictions  Position Activity Restriction  Other position/activity restrictions: Up with assist    Subjective  Subjective  Subjective: Pt in bed upon arrival. Agreeable to OT  Additional Pertinent Hx: 89 y.o. F presented for fatigue and falls. Patient's sugar for EMS was 505, and was 380 in the ED. Hospital Course: PMH: Balance disorder, Depression, Diverticulosis, HTN, Hyperlipidemia, Sleep Apnea, Cystocele Repair.   Pain: Complaint of arm pain. RN aware          Objective           Bed Mobility Training  Bed Mobility Training: Yes  Supine to Sit: Assist X1;Moderate assistance      Balance  Sitting: Intact  Standing: With support (RW- Min to Mod A due to fatigue)      Transfer Training  Transfer Training: Yes  Sit to Stand: Assist X2;Minimum

## 2024-10-02 NOTE — DISCHARGE INSTRUCTIONS
- please follow up with your primary care provider in 1 week.   - follow up with wound care at the facility for your right forearm wound  - continue cefdinir (antibiotic) for a total of 10 days after discharge.

## 2024-10-02 NOTE — DISCHARGE SUMMARY
INTERNAL MEDICINE DEPARTMENT AT THE [facility]  DISCHARGE SUMMARY    Patient ID: Mildred Yusuf                                             Discharge Date: 10/2/2024   Patient's PCP: Mary Benitez MD                                          Discharge Physician: No att. providers found  Admit Date: 9/29/2024   Admitting Physician: Scout So MD    PROBLEMS DURING HOSPITALIZATION:  Present on Admission:   Hyperglycemia      Brief HPI:    Ms. Mildred Yusuf is a 89 y.o. female with a MHx significant for, type 2 diabetes, hyperlipidemia, urinary incontinence presented for fatigue and falls.     Patient lives with son who takes care of her.  Patient had a fall out of bed however did not sustain any injuries.  According to the son patient has started getting progressively weaker physically as well as mentally since the past 4 to 5 days.  On the morning of presentation, patient fell in the bathroom impacted only her knees however it was not a full impact as patient son was able to help assist the fall.  Son states that her UTIs in the past have caused her to become weaker like this.  She takes tramadol twice daily as well as gabapentin, she also takes spironolactone as well as losartan.  Her son stated that her doctor has been trying to wean her off the gabapentin as well as tramadol.  The day prior to presentation to the hospital she had an additional fall in the bathroom and had an injury to her right forearm only involving the skin.  At that time patient's son took her blood sugar which was elevated.  He denies any significant weight loss, fever or chills shortness of breath, chest pain abdominal distention changes in bowel movements blood in urine or stool.  He did state that about the fall from the day she slid from sitting position to the ground.  According to the son at baseline patient is able to dress herself ambulate with her walker, she normally is oriented to self and family  medications  bacitracin-polymyxin b [id number] UNIT/GM ophthalmic ointment  cefdinir 300 MG capsule  melatonin 5 MG Tbdp disintegrating tablet          Activity: as tolerated   Diet: regular diet  Wound Care: as directed       Signed:  Tyrel Irby MD - PGY-1  Internal Medicine   10/2/2024

## 2024-10-02 NOTE — PROGRESS NOTES
Patient was d/c to SNF. Transport picked her up and daughter was her with her. All personal belongings were gathered and daughter took half and transport took the other half. Removed IV and heart monitor. Gave AVS and ANUP to transport. Instruction on how to dress her skin tear were included on the ANUP. Called the [facility] to give report but there was no answer. Left message for a return call and no one called me back.

## 2024-12-21 ENCOUNTER — APPOINTMENT (OUTPATIENT)
Dept: GENERAL RADIOLOGY | Age: 88
DRG: 689 | End: 2024-12-21
Payer: MEDICARE

## 2024-12-21 ENCOUNTER — HOSPITAL ENCOUNTER (INPATIENT)
Age: 88
LOS: 4 days | Discharge: HOME HEALTH | DRG: 689 | End: 2024-12-25
Attending: EMERGENCY MEDICINE | Admitting: INTERNAL MEDICINE
Payer: MEDICARE

## 2024-12-21 DIAGNOSIS — R41.82 ALTERED MENTAL STATUS, UNSPECIFIED ALTERED MENTAL STATUS TYPE: Primary | ICD-10-CM

## 2024-12-21 LAB
ANION GAP SERPL CALCULATED.3IONS-SCNC: 9 MMOL/L (ref 3–16)
BACTERIA URNS QL MICRO: ABNORMAL /HPF
BASE EXCESS BLDV CALC-SCNC: 1.5 MMOL/L (ref -2–3)
BASOPHILS # BLD: 0.1 K/UL (ref 0–0.2)
BASOPHILS NFR BLD: 1.2 %
BILIRUB UR QL STRIP.AUTO: NEGATIVE
BUN SERPL-MCNC: 10 MG/DL (ref 7–20)
CALCIUM SERPL-MCNC: 9.8 MG/DL (ref 8.3–10.6)
CHLORIDE SERPL-SCNC: 96 MMOL/L (ref 99–110)
CLARITY UR: CLEAR
CO2 BLDV-SCNC: 30 MMOL/L
CO2 SERPL-SCNC: 23 MMOL/L (ref 21–32)
COHGB MFR BLDV: 1.3 % (ref 0–1.5)
COLOR UR: YELLOW
CREAT SERPL-MCNC: 0.5 MG/DL (ref 0.6–1.2)
DEPRECATED RDW RBC AUTO: 15.4 % (ref 12.4–15.4)
DO-HGB MFR BLDV: 52.4 %
EOSINOPHIL # BLD: 0.1 K/UL (ref 0–0.6)
EOSINOPHIL NFR BLD: 1.8 %
EPI CELLS #/AREA URNS HPF: ABNORMAL /HPF (ref 0–5)
FLUAV RNA RESP QL NAA+PROBE: NOT DETECTED
FLUBV RNA RESP QL NAA+PROBE: NOT DETECTED
GFR SERPLBLD CREATININE-BSD FMLA CKD-EPI: 84 ML/MIN/{1.73_M2}
GLUCOSE SERPL-MCNC: 164 MG/DL (ref 70–99)
GLUCOSE UR STRIP.AUTO-MCNC: NEGATIVE MG/DL
HCO3 BLDV-SCNC: 28.2 MMOL/L (ref 24–28)
HCT VFR BLD AUTO: 33.7 % (ref 36–48)
HGB BLD-MCNC: 11.4 G/DL (ref 12–16)
HGB UR QL STRIP.AUTO: NEGATIVE
KETONES UR STRIP.AUTO-MCNC: ABNORMAL MG/DL
LACTATE BLDV-SCNC: 0.9 MMOL/L (ref 0.4–1.9)
LEUKOCYTE ESTERASE UR QL STRIP.AUTO: ABNORMAL
LYMPHOCYTES # BLD: 1.7 K/UL (ref 1–5.1)
LYMPHOCYTES NFR BLD: 23 %
MCH RBC QN AUTO: 32 PG (ref 26–34)
MCHC RBC AUTO-ENTMCNC: 33.7 G/DL (ref 31–36)
MCV RBC AUTO: 94.8 FL (ref 80–100)
METHGB MFR BLDV: 0.1 % (ref 0–1.5)
MONOCYTES # BLD: 0.8 K/UL (ref 0–1.3)
MONOCYTES NFR BLD: 11 %
NEUTROPHILS # BLD: 4.8 K/UL (ref 1.7–7.7)
NEUTROPHILS NFR BLD: 63 %
NITRITE UR QL STRIP.AUTO: NEGATIVE
NT-PROBNP SERPL-MCNC: 310 PG/ML (ref 0–449)
PCO2 BLDV: 52.6 MMHG (ref 41–51)
PH BLDV: 7.34 [PH] (ref 7.35–7.45)
PH UR STRIP.AUTO: 7 [PH] (ref 5–8)
PLATELET # BLD AUTO: 322 K/UL (ref 135–450)
PMV BLD AUTO: 7 FL (ref 5–10.5)
PO2 BLDV: <30 MMHG (ref 25–40)
POTASSIUM SERPL-SCNC: 4.4 MMOL/L (ref 3.5–5.1)
PROCALCITONIN SERPL IA-MCNC: 0.05 NG/ML (ref 0–0.15)
PROT UR STRIP.AUTO-MCNC: NEGATIVE MG/DL
RBC # BLD AUTO: 3.56 M/UL (ref 4–5.2)
RBC #/AREA URNS HPF: ABNORMAL /HPF (ref 0–4)
RENAL EPI CELLS #/AREA UR COMP ASSIST: ABNORMAL /HPF (ref 0–1)
SAO2 % BLDV: 47 %
SARS-COV-2 RNA RESP QL NAA+PROBE: NOT DETECTED
SODIUM SERPL-SCNC: 128 MMOL/L (ref 136–145)
SP GR UR STRIP.AUTO: 1.01 (ref 1–1.03)
TROPONIN, HIGH SENSITIVITY: 22 NG/L (ref 0–14)
TROPONIN, HIGH SENSITIVITY: 22 NG/L (ref 0–14)
UA COMPLETE W REFLEX CULTURE PNL UR: YES
UA DIPSTICK W REFLEX MICRO PNL UR: YES
URN SPEC COLLECT METH UR: ABNORMAL
UROBILINOGEN UR STRIP-ACNC: 0.2 E.U./DL
WBC # BLD AUTO: 7.6 K/UL (ref 4–11)
WBC #/AREA URNS HPF: ABNORMAL /HPF (ref 0–5)

## 2024-12-21 PROCEDURE — 99285 EMERGENCY DEPT VISIT HI MDM: CPT

## 2024-12-21 PROCEDURE — 85025 COMPLETE CBC W/AUTO DIFF WBC: CPT

## 2024-12-21 PROCEDURE — 87040 BLOOD CULTURE FOR BACTERIA: CPT

## 2024-12-21 PROCEDURE — 87636 SARSCOV2 & INF A&B AMP PRB: CPT

## 2024-12-21 PROCEDURE — 83930 ASSAY OF BLOOD OSMOLALITY: CPT

## 2024-12-21 PROCEDURE — 93005 ELECTROCARDIOGRAM TRACING: CPT | Performed by: PHYSICIAN ASSISTANT

## 2024-12-21 PROCEDURE — 87086 URINE CULTURE/COLONY COUNT: CPT

## 2024-12-21 PROCEDURE — 2580000003 HC RX 258

## 2024-12-21 PROCEDURE — 36415 COLL VENOUS BLD VENIPUNCTURE: CPT

## 2024-12-21 PROCEDURE — 80048 BASIC METABOLIC PNL TOTAL CA: CPT

## 2024-12-21 PROCEDURE — 1200000000 HC SEMI PRIVATE

## 2024-12-21 PROCEDURE — 83880 ASSAY OF NATRIURETIC PEPTIDE: CPT

## 2024-12-21 PROCEDURE — 6360000002 HC RX W HCPCS

## 2024-12-21 PROCEDURE — 84145 PROCALCITONIN (PCT): CPT

## 2024-12-21 PROCEDURE — 83605 ASSAY OF LACTIC ACID: CPT

## 2024-12-21 PROCEDURE — 2500000003 HC RX 250 WO HCPCS

## 2024-12-21 PROCEDURE — 71045 X-RAY EXAM CHEST 1 VIEW: CPT

## 2024-12-21 PROCEDURE — 82803 BLOOD GASES ANY COMBINATION: CPT

## 2024-12-21 PROCEDURE — 81001 URINALYSIS AUTO W/SCOPE: CPT

## 2024-12-21 PROCEDURE — 84484 ASSAY OF TROPONIN QUANT: CPT

## 2024-12-21 RX ORDER — POLYETHYLENE GLYCOL 3350 17 G/17G
17 POWDER, FOR SOLUTION ORAL DAILY
Status: DISCONTINUED | OUTPATIENT
Start: 2024-12-22 | End: 2024-12-25 | Stop reason: HOSPADM

## 2024-12-21 RX ORDER — LOSARTAN POTASSIUM 50 MG/1
100 TABLET ORAL DAILY
Status: DISCONTINUED | OUTPATIENT
Start: 2024-12-22 | End: 2024-12-25 | Stop reason: HOSPADM

## 2024-12-21 RX ORDER — VITAMIN B COMPLEX
1000 TABLET ORAL DAILY
Status: DISCONTINUED | OUTPATIENT
Start: 2024-12-22 | End: 2024-12-25 | Stop reason: HOSPADM

## 2024-12-21 RX ORDER — SODIUM CHLORIDE 9 MG/ML
INJECTION, SOLUTION INTRAVENOUS PRN
Status: DISCONTINUED | OUTPATIENT
Start: 2024-12-21 | End: 2024-12-25 | Stop reason: HOSPADM

## 2024-12-21 RX ORDER — ACETAMINOPHEN 325 MG/1
650 TABLET ORAL EVERY 6 HOURS PRN
Status: DISCONTINUED | OUTPATIENT
Start: 2024-12-21 | End: 2024-12-25 | Stop reason: HOSPADM

## 2024-12-21 RX ORDER — ACETAMINOPHEN 650 MG/1
650 SUPPOSITORY RECTAL EVERY 6 HOURS PRN
Status: DISCONTINUED | OUTPATIENT
Start: 2024-12-21 | End: 2024-12-25 | Stop reason: HOSPADM

## 2024-12-21 RX ORDER — SODIUM CHLORIDE 0.9 % (FLUSH) 0.9 %
5-40 SYRINGE (ML) INJECTION PRN
Status: DISCONTINUED | OUTPATIENT
Start: 2024-12-21 | End: 2024-12-25 | Stop reason: HOSPADM

## 2024-12-21 RX ORDER — ONDANSETRON 2 MG/ML
4 INJECTION INTRAMUSCULAR; INTRAVENOUS EVERY 6 HOURS PRN
Status: DISCONTINUED | OUTPATIENT
Start: 2024-12-21 | End: 2024-12-25 | Stop reason: HOSPADM

## 2024-12-21 RX ORDER — ONDANSETRON 4 MG/1
4 TABLET, ORALLY DISINTEGRATING ORAL EVERY 8 HOURS PRN
Status: DISCONTINUED | OUTPATIENT
Start: 2024-12-21 | End: 2024-12-25 | Stop reason: HOSPADM

## 2024-12-21 RX ORDER — ENOXAPARIN SODIUM 100 MG/ML
40 INJECTION SUBCUTANEOUS DAILY
Status: DISCONTINUED | OUTPATIENT
Start: 2024-12-22 | End: 2024-12-25 | Stop reason: HOSPADM

## 2024-12-21 RX ORDER — SODIUM CHLORIDE 0.9 % (FLUSH) 0.9 %
5-40 SYRINGE (ML) INJECTION EVERY 12 HOURS SCHEDULED
Status: DISCONTINUED | OUTPATIENT
Start: 2024-12-21 | End: 2024-12-25 | Stop reason: HOSPADM

## 2024-12-21 RX ORDER — INSULIN LISPRO 100 [IU]/ML
0-4 INJECTION, SOLUTION INTRAVENOUS; SUBCUTANEOUS
Status: DISCONTINUED | OUTPATIENT
Start: 2024-12-21 | End: 2024-12-25 | Stop reason: HOSPADM

## 2024-12-21 RX ORDER — SODIUM CHLORIDE, SODIUM LACTATE, POTASSIUM CHLORIDE, CALCIUM CHLORIDE 600; 310; 30; 20 MG/100ML; MG/100ML; MG/100ML; MG/100ML
INJECTION, SOLUTION INTRAVENOUS CONTINUOUS
Status: DISCONTINUED | OUTPATIENT
Start: 2024-12-21 | End: 2024-12-22

## 2024-12-21 RX ORDER — M-VIT,TX,IRON,MINS/CALC/FOLIC 27MG-0.4MG
1 TABLET ORAL DAILY
COMMUNITY

## 2024-12-21 RX ORDER — SPIRONOLACTONE 25 MG/1
25 TABLET ORAL DAILY
Status: DISCONTINUED | OUTPATIENT
Start: 2024-12-22 | End: 2024-12-22

## 2024-12-21 RX ADMIN — SODIUM CHLORIDE, POTASSIUM CHLORIDE, SODIUM LACTATE AND CALCIUM CHLORIDE: 600; 310; 30; 20 INJECTION, SOLUTION INTRAVENOUS at 23:40

## 2024-12-21 RX ADMIN — WATER 1000 MG: 1 INJECTION INTRAMUSCULAR; INTRAVENOUS; SUBCUTANEOUS at 23:42

## 2024-12-21 RX ADMIN — SODIUM CHLORIDE, PRESERVATIVE FREE 10 ML: 5 INJECTION INTRAVENOUS at 23:45

## 2024-12-21 ASSESSMENT — PAIN - FUNCTIONAL ASSESSMENT: PAIN_FUNCTIONAL_ASSESSMENT: NONE - DENIES PAIN

## 2024-12-21 NOTE — ED PROVIDER NOTES
No masses palpated.   Musculoskeletal:         General: Normal range of motion.      Cervical back: Normal range of motion.      Comments: No swelling present to the bilateral lower extremities.  Patient does have multiple areas of ecchymosis in varying stages of healing.   Skin:     General: Skin is warm and dry.   Neurological:      Mental Status: She is confused.      GCS: GCS eye subscore is 2. GCS verbal subscore is 4. GCS motor subscore is 5.      Comments: Oriented to person only.  Patient does not answer other questions aside from this regarding her complaints, condition, recent history.  She mumbles unintelligible words         Diagnostic Results   EKG  EKG shows sinus rhythm with first-degree AV block at a rate of 71 bpm.  No ST elevation or depression present.  CT interval 260.  QRS 86.      RADIOLOGY:  No orders to display       LABS:   No results found for this visit on 12/21/24.    ED BEDSIDE ULTRASOUND:  No results found.    RECENT VITALS:  BP: 122/62, Temp: 97.7 °F (36.5 °C), Pulse: 71, Respirations: 16, SpO2: 96 %     Procedures         ED Course     Nursing Notes, Past Medical Hx,Past Surgical Hx, Social Hx, Allergies, and Family Hx were reviewed.         The patient was given the following medications:  No orders of the defined types were placed in this encounter.      CONSULTS:  None    MEDICAL DECISION MAKING / ASSESSMENT / PLAN     Mildred Yusuf is a 89 y.o. female here with altered mental status from her home where she is cared for by her son she is alert to person only.  On exam she is very sleepy but is aroused with verbal stimulation and is able to tell me her name.  She does not comply with interview regarding any other complaints, recent history.  She has no pain response to palpation of the abdomen.  No swelling present to the bilateral lower extremities.  Lung sounds are clear to auscultation bilaterally.     EKG shows sinus rhythm with first-degree AV block at a rate of 71      PATIENT REFERRED TO:  No follow-up provider specified.    DISCHARGE MEDICATIONS:  New Prescriptions    No medications on file       DISPOSITION                   Nathan Bean PA-C  12/21/24 [id number]

## 2024-12-22 LAB
ALBUMIN SERPL-MCNC: 3.4 G/DL (ref 3.4–5)
ANION GAP SERPL CALCULATED.3IONS-SCNC: 8 MMOL/L (ref 3–16)
BASOPHILS # BLD: 0 K/UL (ref 0–0.2)
BASOPHILS NFR BLD: 0.6 %
BUN SERPL-MCNC: 8 MG/DL (ref 7–20)
CALCIUM SERPL-MCNC: 10 MG/DL (ref 8.3–10.6)
CHLORIDE SERPL-SCNC: 99 MMOL/L (ref 99–110)
CO2 SERPL-SCNC: 26 MMOL/L (ref 21–32)
CREAT SERPL-MCNC: 0.5 MG/DL (ref 0.6–1.2)
DEPRECATED RDW RBC AUTO: 15.2 % (ref 12.4–15.4)
EKG ATRIAL RATE: 71 BPM
EKG DIAGNOSIS: NORMAL
EKG P AXIS: 105 DEGREES
EKG P-R INTERVAL: 260 MS
EKG Q-T INTERVAL: 400 MS
EKG QRS DURATION: 86 MS
EKG QTC CALCULATION (BAZETT): 434 MS
EKG R AXIS: 46 DEGREES
EKG T AXIS: 58 DEGREES
EKG VENTRICULAR RATE: 71 BPM
EOSINOPHIL # BLD: 0.1 K/UL (ref 0–0.6)
EOSINOPHIL NFR BLD: 2.5 %
GFR SERPLBLD CREATININE-BSD FMLA CKD-EPI: 84 ML/MIN/{1.73_M2}
GLUCOSE BLD-MCNC: 142 MG/DL (ref 70–99)
GLUCOSE BLD-MCNC: 151 MG/DL (ref 70–99)
GLUCOSE BLD-MCNC: 160 MG/DL (ref 70–99)
GLUCOSE BLD-MCNC: 168 MG/DL (ref 70–99)
GLUCOSE BLD-MCNC: 169 MG/DL (ref 70–99)
GLUCOSE SERPL-MCNC: 152 MG/DL (ref 70–99)
HCT VFR BLD AUTO: 34.4 % (ref 36–48)
HGB BLD-MCNC: 11.6 G/DL (ref 12–16)
LYMPHOCYTES # BLD: 1.6 K/UL (ref 1–5.1)
LYMPHOCYTES NFR BLD: 26.5 %
MAGNESIUM SERPL-MCNC: 1.77 MG/DL (ref 1.8–2.4)
MCH RBC QN AUTO: 32 PG (ref 26–34)
MCHC RBC AUTO-ENTMCNC: 33.8 G/DL (ref 31–36)
MCV RBC AUTO: 94.9 FL (ref 80–100)
MONOCYTES # BLD: 0.7 K/UL (ref 0–1.3)
MONOCYTES NFR BLD: 12.3 %
NEUTROPHILS # BLD: 3.5 K/UL (ref 1.7–7.7)
NEUTROPHILS NFR BLD: 58.1 %
OSMOLALITY SERPL: 281 MOSM/KG (ref 278–305)
PERFORMED ON: ABNORMAL
PHOSPHATE SERPL-MCNC: 3.1 MG/DL (ref 2.5–4.9)
PLATELET # BLD AUTO: 311 K/UL (ref 135–450)
PMV BLD AUTO: 6.8 FL (ref 5–10.5)
POTASSIUM SERPL-SCNC: 4.4 MMOL/L (ref 3.5–5.1)
RBC # BLD AUTO: 3.63 M/UL (ref 4–5.2)
SODIUM SERPL-SCNC: 133 MMOL/L (ref 136–145)
WBC # BLD AUTO: 6 K/UL (ref 4–11)

## 2024-12-22 PROCEDURE — 93010 ELECTROCARDIOGRAM REPORT: CPT | Performed by: INTERNAL MEDICINE

## 2024-12-22 PROCEDURE — 2580000003 HC RX 258

## 2024-12-22 PROCEDURE — 85025 COMPLETE CBC W/AUTO DIFF WBC: CPT

## 2024-12-22 PROCEDURE — 92610 EVALUATE SWALLOWING FUNCTION: CPT

## 2024-12-22 PROCEDURE — 36415 COLL VENOUS BLD VENIPUNCTURE: CPT

## 2024-12-22 PROCEDURE — 6360000002 HC RX W HCPCS

## 2024-12-22 PROCEDURE — 1200000000 HC SEMI PRIVATE

## 2024-12-22 PROCEDURE — 80069 RENAL FUNCTION PANEL: CPT

## 2024-12-22 PROCEDURE — 83735 ASSAY OF MAGNESIUM: CPT

## 2024-12-22 RX ORDER — SODIUM CHLORIDE 9 MG/ML
INJECTION, SOLUTION INTRAVENOUS CONTINUOUS
Status: DISCONTINUED | OUTPATIENT
Start: 2024-12-22 | End: 2024-12-25 | Stop reason: HOSPADM

## 2024-12-22 RX ORDER — GLUCAGON 1 MG/ML
1 KIT INJECTION PRN
Status: DISCONTINUED | OUTPATIENT
Start: 2024-12-22 | End: 2024-12-25 | Stop reason: HOSPADM

## 2024-12-22 RX ORDER — MAGNESIUM SULFATE IN WATER 40 MG/ML
2000 INJECTION, SOLUTION INTRAVENOUS ONCE
Status: COMPLETED | OUTPATIENT
Start: 2024-12-22 | End: 2024-12-22

## 2024-12-22 RX ORDER — DEXTROSE MONOHYDRATE 100 MG/ML
INJECTION, SOLUTION INTRAVENOUS CONTINUOUS PRN
Status: DISCONTINUED | OUTPATIENT
Start: 2024-12-22 | End: 2024-12-25 | Stop reason: HOSPADM

## 2024-12-22 RX ADMIN — MAGNESIUM SULFATE HEPTAHYDRATE 2000 MG: 40 INJECTION, SOLUTION INTRAVENOUS at 09:34

## 2024-12-22 RX ADMIN — SODIUM CHLORIDE: 9 INJECTION, SOLUTION INTRAVENOUS at 18:37

## 2024-12-22 RX ADMIN — SODIUM CHLORIDE: 9 INJECTION, SOLUTION INTRAVENOUS at 03:13

## 2024-12-22 RX ADMIN — ENOXAPARIN SODIUM 40 MG: 100 INJECTION SUBCUTANEOUS at 09:29

## 2024-12-22 NOTE — H&P
infection and  should not be used as the sole basis for patient management  decisions.  Results must be combined with clinical observations,  patient history, and epidemiological information.  Testing was performed using ELAINE BRITTANY SARS-CoV-2 and Influenza A/B  nucleic acid assay. This test is a multiplex Real-Time Reverse  Transcriptase Polymerase Chain Reaction (RT-PCR)-based in vitro  diagnostic test intended for the qualitative detection of nucleic  acids from SARS-CoV-2, influenza A, and influenza B in nasopharyngeal  and nasal swab specimens for use under the FDA’s Emergency Use  Authorization (EUA) only.    Patient Fact Sheet:  https://www.fda.gov/media/995871/download  Provider Fact Sheet: https://www.fda.gov/media/272505/download  EUA: https://www.fda.gov/media/320505/download  IFU: https://www.fda.gov/media/848389/download    Methodology:  RT-PCR          Influenza A NOT DETECTED     Influenza B NOT DETECTED    Culture, Urine [[id number]] Collected: 12/21/24    Order Status: No result              RADIOLOGY:  XR CHEST PORTABLE   Final Result   1. Mild pulmonary vascular congestion   2. Nonspecific airspace disease right lower lobe possibly related to alveolar edema from vascular congestion versus pneumonitis      Electronically signed by MD Moody Tapia        Assessment & Plan   89 y.o. female who presented to the ED on 12/21 with altered mental status. Patient is currently clinically stable and admitted for further management.    #Acute infectious and metabolic Encephalopathy  #Urinary Tract Infection  Patient presenting with AMS at home for the past few days. Patient reportedly has a history of recurrent UTIs and recently completed course of cefdinir. UA shows moderate leukocyte esterase but WBC wnl and patient afebrile. Patient did appear significantly hypovolemic and has had poor PO so will provide fluids.  - IV CTX  - IV NS 100cc/hr  - urine cultures pending  - dietary consult placed  - NPO, until SLP

## 2024-12-22 NOTE — PROGRESS NOTES
Pt arrived to [address] in stable condition. Oriented to room, use of call light, and fall precautions. Pt remains lethargic. Son at bedside and admission questions completed with son who is caregiver. Son updated on plan of  care, all questions answered at this time. Call light is in reach. Bed alarm is engaged.

## 2024-12-22 NOTE — PLAN OF CARE
[facility] Hospitalist brief note  Consult received.  Case reviewed with ER physician- AMS/UTI    Full note to follow.    Mary Benitez MD    Thanks  AMADOU GATES MD

## 2024-12-22 NOTE — ED PROVIDER NOTES
ED Attending Attestation Note     Date of evaluation: 12/21/2024    This patient was seen by the advance practice provider.  I have seen and examined the patient, agree with the workup, evaluation, management and diagnosis. The care plan has been discussed.  I have reviewed the ECG and concur with the LUIGI's interpretation.  My assessment reveals a 89-year-old female who presents with a chief complaint of altered mental status.  Brought in by family for altered mental status.  On exam does seem lethargic..    General: This is a wd/wn elderly  in no acute distress who appears their stated age.     HEENT: NC/AT. PERRL. OP clear. MMM.     Neck: Supple. Trachea midline.    Pulmonary: Normal work of breathing, not using accessory muscles or pursed lip breathing    Cardiac: RRR    Abdomen: S/NT/ND/+BS. No cva tenderness.     Musculoskeletal: WWP with no clubbing, cyanosis, or deformities noted.     Vascular: +2 radial and DP pulses.     Skin: Warm and dry with no lesions noted    Neuro:  lethagic. No obvious focal neuro deficits.        Marjorie Posey MD  12/21/24 [id number]

## 2024-12-22 NOTE — PROGRESS NOTES
Internal Medicine Progress Note    Patient Name: Mildred Yusuf   Patient : 1925   Date: 2024   Admit Date: 2024     CC: Altered Mental Status (Brought in by squad due to altered mental status. Patient appears lethargic and oriented to self upon arrival. Normally alert and oriented x4 according to caregiver as per EMS)       Interval History     S: Sleepy, only oriented to person. Not in acute distress. Has had worsening activity tolerance over the last 4 months, recently discharged from rehab and then treated with 10 day course of cefdinir for presumptive UTI. At new baseline she is x1 transfer but does not walk around the house, was alert/oriented x3. In last 3 days she has had visual hallucinations (animals, dead relatives) with alternating hyperactive and hypoactive periods. Son was concerned this is due to worsening/untreated UTI. She has continued to receive tramodol, actos and gabapentin at home. Recent poor oral intake at home.                O:   -135, O2 100% RA, Tm 36.5, UO 500ml   Labs: WBC 6.0, UA 10-20 WBC, urine culture pending,   A/P: continue abx, adjust based on sensitivity/culture. Gentle fluid replacement.     ROS   Review of Systems   Unable to perform ROS: Mental status change   Psychiatric/Behavioral:  Positive for confusion, hallucinations and sleep disturbance.    All other systems reviewed and are negative.         Objective     I/Os:  I/O last 3 completed shifts:  In: -   Out: 500 [Urine:500]      Vital Signs:  Patient Vitals for the past 8 hrs:   BP Temp Temp src Pulse Resp SpO2   24 0316 136/71 97.2 °F (36.2 °C) Axillary 69 18 100 %       Physical Exam:  Physical Exam  Vitals reviewed.   Constitutional:       General: She is sleeping.      Appearance: Normal appearance.   HENT:      Head: Normocephalic and atraumatic.      Nose: Nose normal.      Mouth/Throat:      Mouth: Mucous membranes are moist.      Pharynx: Oropharynx is clear.   Eyes:

## 2024-12-22 NOTE — PLAN OF CARE
Intervention: Swallow Evaluation/treatment  SLP completed evaluation. Please refer to notes in EMR.    Electronically signed by:  Una Rahman M.A., CCC-SLP  [id number]  Speech-Language Pathologist  Pg #: [phone]

## 2024-12-22 NOTE — PROGRESS NOTES
Speech Language Pathology  Facility/Department:[address] TELEMETRY  Swallow Evaluation  Name: Mildred Yusuf  : 1925  MRN: [id number]                                                       Patient Diagnosis(es):   Patient Active Problem List    Diagnosis Date Noted    Pneumonia 2022    AMS (altered mental status) 2024    Hyperglycemia 2024    Cancer, skin, squamous cell 2017    DDD (degenerative disc disease), cervical 2017    Diabetic polyneuropathy associated with type 2 diabetes mellitus (HCC) 2017    Age-related macular degeneration, dry     Low magnesium levels     Depression     Sleep apnea     B12 deficiency     Diabetic neuropathy (HCC)     Balance disorder     Constipation, chronic     Hyperlipidemia     Essential hypertension with goal blood pressure less than 150/90     Type 2 diabetes mellitus (HCC)     Diverticulitis     Exposure     Peripheral edema     Dermatophytosis of foot 2010    Dysuria 2010    Urinary incontinence 2009    Osteoarthrosis involving lower leg 2009    Contact dermatitis and other eczema, due to unspecified cause 2009    Inflamed seborrheic keratosis 2009    Actinic keratosis 2008    Benign neoplasm of skin 2008       Past Medical History:   Diagnosis Date    Age-related macular degeneration, dry     Anemia B12 deficiency     Balance disorder     Cancer, skin, squamous cell 12/10    right lower leg x 2 - having these surgically removed.    Chronic constipation     Colorectal polyps     Depression     Diverticulosis     Epistaxis     recurrent     Hyperlipidemia     Hypertension     Low magnesium levels     Neuropathy     peripheral - Dr. Rubin    Osteoarthritis     Peripheral edema     Pruritis     Dr. Echols    Recurrent UTI     Small vessel disease, cerebrovascular 3/2013    MRI    Tuberculosis     as a child    Type II or unspecified type diabetes mellitus without mention of  which requires further assessment. Pt seated upright in bed, kept eyes closed throughout most of evaluation unless prompted to open. Overall very lethargic vs fatigued. Clean and clear oral cavity, pt appears to have dentures, however pt unable to confirm when questioned. Pt did readily accept all trials of ice chips, tsps water, cup sips water and tsps puree. Pt making grimacing facial expression when manipulating PO and swallowing. Pt denied discomfort, however nodded head yes when asked if disliked PO. Of note pt unable to siphon from straw. Instances of ? No swallow initiation with verbal/tactile cues required. Pt eventually appeared to swallow all PO. X1 instance of throat clearing after completion of swallow which may be indicative of s/s aspiration. WBC and Neutrophils Absolute are currently WNL. Based on assessment and increased lethargy, SLP recommends continue NPO with re-assessment at the bedside to be completed to determine readiness for oral diet initiation vs need for instrumental assessment. Recommend oral care 2-3x/day and allow ice chips/tsps water with RN/SLP only. Necessary meds can be trialed crushed (as able) vs whole in puree. Relayed to RN.     Instrumental assessment results  TBD if indicated    Prognosis:  Prognosis for improvement: fair  Barriers to reach goals: age, ? baseline    Treatment:  Dysphagia Goals:  The pt will be seen  1-2 x to address the following goals:  Goal 1: Patient will participate in repeat bedside swallowing assessment.  Goal 2: Patient/caregiver will demonstrate understanding of dysphagia recommendations/concerns.    Education  Provided education to patient re: role of SLP, purpose of visit, and recommendations. Patient with no comprehension. No secondary learners present. Relayed all information to RN.     Pt goal: none stated  Pt discharge goal: none stated    Total treatment time: 15 minutes dysphagia evaluation     Plan: Continue per POC  Recommended Diet: NPO

## 2024-12-22 NOTE — ED NOTES
Patient Name: Mildred Yusuf  : 1925 89 y.o.  MRN: [id number]  ED Room #: A03/A03-03     Chief complaint:   Chief Complaint   Patient presents with    Altered Mental Status     Brought in by squad due to altered mental status. Patient appears lethargic and oriented to self upon arrival. Normally alert and oriented x4 according to caregiver as per EMS     Hospital Problem/Diagnosis:   Hospital Problems             Last Modified POA    * (Principal) AMS (altered mental status) 2024 Yes         O2 Flow Rate:O2 Device: None (Room air)   (if applicable)  Cardiac Rhythm:   (if applicable)  Active LDA's:   Peripheral IV 24 Right Antecubital (Active)            How does patient ambulate? Unknown, did not assess in the Emergency Department    2. How does patient take pills? Unknown, no oral medications were given in the Emergency Department    3. Is patient alert? Doesn't respond to voice but responds to painful stimuli    4. Is patient oriented? Confused    5.   Patient arrived from:  home  Facility Name: ___________________________________________    6. If patient is disoriented or from a Skill Nursing Facility has family been notified of admission? Yes    7. Patient belongings? Belongings: Hearing Aids and Clothing    Disposition of belongings? Sent with Family     8. Any specific patient or family belongings/needs/dynamics?   a. NA    9. Miscellaneous comments/pending orders?  a. NA     If there are any additional questions please reach out to the Emergency Department.      David Mccann, RN  [phone]

## 2024-12-22 NOTE — PLAN OF CARE
Problem: Discharge Planning  Goal: Discharge to home or other facility with appropriate resources  Outcome: Progressing  Plan of care reviewed with pt. All questions answered at this time.      Problem: Skin/Tissue Integrity  Goal: Absence of new skin breakdown  Outcome: Progressing  Turn and reposition q 2 hrs.

## 2024-12-22 NOTE — PROGRESS NOTES
4 Eyes Skin Assessment     NAME:  Mildred Yusuf  YOB: 1925  MEDICAL RECORD NUMBER:  [id number]    The patient is being assessed for  Admission    I agree that at least one RN has performed a thorough Head to Toe Skin Assessment on the patient. ALL assessment sites listed below have been assessed.      Areas assessed by both nurses:    Head, Face, Ears, Shoulders, Back, Chest, Arms, Elbows, Hands, Sacrum. Buttock, Coccyx, Ischium, and Legs. Feet and Heels        Does the Patient have a Wound? Yes wound(s) were present on assessment. LDA wound assessment was Initiated and completed by RN, non-blanching redness to coccyx, skin tears to R shin and L calf       [place] Prevention initiated by RN: Yes  Wound Care Orders initiated by RN: No    Pressure Injury (Stage 3,4, Unstageable, DTI, NWPT, and Complex wounds) if present, place Wound referral order by RN under : No    New Ostomies, if present place, Ostomy referral order under : No     Nurse 1 eSignature: Electronically signed by Luci Tovar RN on 12/22/24 at 1:29 AM EST    **SHARE this note so that the co-signing nurse can place an eSignature**    Nurse 2 eSignature: Electronically signed by Finn Sanchez RN on 12/22/24 at 6:58 AM EST

## 2024-12-22 NOTE — PLAN OF CARE
Problem: Chronic Conditions and Co-morbidities  Goal: Patient's chronic conditions and co-morbidity symptoms are monitored and maintained or improved  Outcome: Progressing  Flowsheets (Taken 12/22/2024 1118)  Care Plan - Patient's Chronic Conditions and Co-Morbidity Symptoms are Monitored and Maintained or Improved: Monitor and assess patient's chronic conditions and comorbid symptoms for stability, deterioration, or improvement     Problem: Discharge Planning  Goal: Discharge to home or other facility with appropriate resources  12/22/2024 1118 by Bell Bauer RN  Outcome: Progressing  Flowsheets (Taken 12/22/2024 1118)  Discharge to home or other facility with appropriate resources:   Identify barriers to discharge with patient and caregiver   Arrange for needed discharge resources and transportation as appropriate     Problem: Safety - Adult  Goal: Free from fall injury  Outcome: Progressing  Note: Pt remains free of fall. Bed alarm on, bed lowest position, grippers socks on and call light within reach.      Problem: Skin/Tissue Integrity  Goal: Absence of new skin breakdown  Description: 1.  Monitor for areas of redness and/or skin breakdown  2.  Assess vascular access sites hourly  3.  Every 4-6 hours minimum:  Change oxygen saturation probe site  4.  Every 4-6 hours:  If on nasal continuous positive airway pressure, respiratory therapy assess nares and determine need for appliance change or resting period.  12/22/2024 1118 by Bell Bauer RN  Outcome: Progressing  Note: Repositioned frequently     Problem: Confusion  Goal: Confusion, delirium, dementia, or psychosis is improved or at baseline  Description: INTERVENTIONS:  1. Assess for possible contributors to thought disturbance, including medications, impaired vision or hearing, underlying metabolic abnormalities, dehydration, psychiatric diagnoses, and notify attending LIP  2. [place] high risk fall precautions, as indicated  3. Provide frequent short

## 2024-12-23 LAB
ALBUMIN SERPL-MCNC: 3.3 G/DL (ref 3.4–5)
ANION GAP SERPL CALCULATED.3IONS-SCNC: 11 MMOL/L (ref 3–16)
BASOPHILS # BLD: 0.1 K/UL (ref 0–0.2)
BASOPHILS NFR BLD: 1.2 %
BUN SERPL-MCNC: 9 MG/DL (ref 7–20)
CALCIUM SERPL-MCNC: 9.5 MG/DL (ref 8.3–10.6)
CHLORIDE SERPL-SCNC: 102 MMOL/L (ref 99–110)
CO2 SERPL-SCNC: 22 MMOL/L (ref 21–32)
CREAT SERPL-MCNC: 0.5 MG/DL (ref 0.6–1.2)
DEPRECATED RDW RBC AUTO: 15.2 % (ref 12.4–15.4)
EOSINOPHIL # BLD: 0.1 K/UL (ref 0–0.6)
EOSINOPHIL NFR BLD: 1.9 %
GFR SERPLBLD CREATININE-BSD FMLA CKD-EPI: 84 ML/MIN/{1.73_M2}
GLUCOSE BLD-MCNC: 128 MG/DL (ref 70–99)
GLUCOSE BLD-MCNC: 158 MG/DL (ref 70–99)
GLUCOSE BLD-MCNC: 178 MG/DL (ref 70–99)
GLUCOSE BLD-MCNC: 204 MG/DL (ref 70–99)
GLUCOSE SERPL-MCNC: 128 MG/DL (ref 70–99)
HCT VFR BLD AUTO: 33.3 % (ref 36–48)
HGB BLD-MCNC: 11.4 G/DL (ref 12–16)
LIPASE SERPL-CCNC: 19 U/L (ref 13–60)
LYMPHOCYTES # BLD: 1.2 K/UL (ref 1–5.1)
LYMPHOCYTES NFR BLD: 18.8 %
MAGNESIUM SERPL-MCNC: 1.76 MG/DL (ref 1.8–2.4)
MCH RBC QN AUTO: 32.4 PG (ref 26–34)
MCHC RBC AUTO-ENTMCNC: 34.2 G/DL (ref 31–36)
MCV RBC AUTO: 94.7 FL (ref 80–100)
MONOCYTES # BLD: 0.6 K/UL (ref 0–1.3)
MONOCYTES NFR BLD: 9.2 %
NEUTROPHILS # BLD: 4.5 K/UL (ref 1.7–7.7)
NEUTROPHILS NFR BLD: 68.9 %
PERFORMED ON: ABNORMAL
PHOSPHATE SERPL-MCNC: 2.9 MG/DL (ref 2.5–4.9)
PLATELET # BLD AUTO: 334 K/UL (ref 135–450)
PMV BLD AUTO: 7.1 FL (ref 5–10.5)
POTASSIUM SERPL-SCNC: 3.9 MMOL/L (ref 3.5–5.1)
RBC # BLD AUTO: 3.52 M/UL (ref 4–5.2)
SODIUM SERPL-SCNC: 135 MMOL/L (ref 136–145)
WBC # BLD AUTO: 6.6 K/UL (ref 4–11)

## 2024-12-23 PROCEDURE — 97166 OT EVAL MOD COMPLEX 45 MIN: CPT

## 2024-12-23 PROCEDURE — 97530 THERAPEUTIC ACTIVITIES: CPT

## 2024-12-23 PROCEDURE — 92526 ORAL FUNCTION THERAPY: CPT

## 2024-12-23 PROCEDURE — 36415 COLL VENOUS BLD VENIPUNCTURE: CPT

## 2024-12-23 PROCEDURE — 2580000003 HC RX 258

## 2024-12-23 PROCEDURE — 1200000000 HC SEMI PRIVATE

## 2024-12-23 PROCEDURE — 6370000000 HC RX 637 (ALT 250 FOR IP)

## 2024-12-23 PROCEDURE — 97162 PT EVAL MOD COMPLEX 30 MIN: CPT

## 2024-12-23 PROCEDURE — 6360000002 HC RX W HCPCS

## 2024-12-23 PROCEDURE — 83735 ASSAY OF MAGNESIUM: CPT

## 2024-12-23 PROCEDURE — 97535 SELF CARE MNGMENT TRAINING: CPT

## 2024-12-23 PROCEDURE — 2500000003 HC RX 250 WO HCPCS

## 2024-12-23 PROCEDURE — 80069 RENAL FUNCTION PANEL: CPT

## 2024-12-23 PROCEDURE — 83690 ASSAY OF LIPASE: CPT

## 2024-12-23 PROCEDURE — 85025 COMPLETE CBC W/AUTO DIFF WBC: CPT

## 2024-12-23 RX ORDER — MAGNESIUM SULFATE IN WATER 40 MG/ML
2000 INJECTION, SOLUTION INTRAVENOUS ONCE
Status: COMPLETED | OUTPATIENT
Start: 2024-12-23 | End: 2024-12-23

## 2024-12-23 RX ADMIN — ENOXAPARIN SODIUM 40 MG: 100 INJECTION SUBCUTANEOUS at 09:11

## 2024-12-23 RX ADMIN — WATER 1000 MG: 1 INJECTION INTRAMUSCULAR; INTRAVENOUS; SUBCUTANEOUS at 00:42

## 2024-12-23 RX ADMIN — INSULIN LISPRO 1 UNITS: 100 INJECTION, SOLUTION INTRAVENOUS; SUBCUTANEOUS at 12:47

## 2024-12-23 RX ADMIN — SODIUM CHLORIDE: 9 INJECTION, SOLUTION INTRAVENOUS at 09:13

## 2024-12-23 RX ADMIN — MAGNESIUM SULFATE HEPTAHYDRATE 2000 MG: 40 INJECTION, SOLUTION INTRAVENOUS at 09:18

## 2024-12-23 RX ADMIN — WATER 1000 MG: 1 INJECTION INTRAMUSCULAR; INTRAVENOUS; SUBCUTANEOUS at 23:12

## 2024-12-23 ASSESSMENT — PAIN SCALES - PAIN ASSESSMENT IN ADVANCED DEMENTIA (PAINAD)
FACIALEXPRESSION: SMILING OR INEXPRESSIVE
TOTALSCORE: 5
CONSOLABILITY: NO NEED TO CONSOLE
TOTALSCORE: 0
BREATHING: NORMAL
FACIALEXPRESSION: SMILING OR INEXPRESSIVE
CONSOLABILITY: UNABLE TO CONSOLE, DISTRACT OR REASSURE
FACIALEXPRESSION: SAD, FRIGHTENED, FROWN
TOTALSCORE: 1
CONSOLABILITY: NO NEED TO CONSOLE
FACIALEXPRESSION: SMILING OR INEXPRESSIVE
BREATHING: NORMAL
CONSOLABILITY: NO NEED TO CONSOLE
BODYLANGUAGE: RELAXED
TOTALSCORE: 0
BREATHING: NORMAL
BODYLANGUAGE: RELAXED
BODYLANGUAGE: RIGID, FISTS CLENCHED, KNEES UP, PUSHING/PULLING AWAY, STRIKES OUT
BREATHING: OCCASIONAL LABORED BREATHING, SHORT PERIOD OF HYPERVENTILATION
BODYLANGUAGE: RELAXED

## 2024-12-23 NOTE — CONSULTS
HL Spoke with Estephania jeffries; setting up Home HOC services - HL RN Met with patient and family to discuss hospice philosophy and services on 12/23/24 Discussed covered vs noncovered equipment, services, and medications and daughter/family right to request a review of requested/noncovered items. No non covered list requested. Time spent listening to events of illness, answering questions, and providing emotional support.. Patient and family requesting HOC doctor to follow. Dr.Giafranco Beti MD was contacted and verbal CTI obtained. HL discussed the following with the patient: CPR preference, life sustaining treatment preferences, hospitalization preferences, and spiritual/existential concerns. Pt/family  wants to focus on comfort measures. No further hospitalizations, pt is a DNR-CC. Docu sign sent to [email or web address] to [facility] to be sent to be signed. [facility] HL will follow up in am.     Thank you for this referral,  Please call [facility] for questions/concerns at [phone]     Ria KruegerKelseyJv Jimenez, MSN, RN  Hospital Liaison    [address] [place] [address], [address], [place] [id number]     C: [phone]/ F: [phone]    Main & Referrals: [phone]    [facility]

## 2024-12-23 NOTE — CONSULTS
[facility]  HL received call from Palliative Care RN Neetu MONTESINOS that she was placing a new Referral for [facility]- Called and spoke with Bob Yusuf (Son/caregiver) re: pt has all the DME needed, denied any additional DME. HL explained HOC Services re: mission, care and services. Bbo stated that his sister Estephania is the HCPOA. HL left message to Estephania [phone] to return call to [facility]. [facility] will follow up on Tuesday.     Thank you for this referral,    Please call [facility] for questions/concerns at [phone]     Ria KruegerKelseyJv Jimenez, MSN, RN  Hospital Liaison    [address] [place] [address], [address], [place] [id number]     C: [phone]/ F: [phone]    Main & Referrals: [phone]    [facility]

## 2024-12-23 NOTE — PROGRESS NOTES
Physical Therapy  Facility/Department: [address]  Physical Therapy Initial Assessment/discharge note    Name: Mildred Yusuf  : 1925  MRN: [id number]  Date of Service: 2024    Discharge Recommendations:24 hour assist      PT Equipment Recommendations  Equipment Needed:  (michelle lift for home as pt is room confined)      Patient Diagnosis(es): The encounter diagnosis was Altered mental status, unspecified altered mental status type.  Past Medical History:  has a past medical history of Age-related macular degeneration, dry, Anemia B12 deficiency, Balance disorder, Cancer, skin, squamous cell, Chronic constipation, Colorectal polyps, Depression, Diverticulosis, Epistaxis, Hyperlipidemia, Hypertension, Low magnesium levels, Neuropathy, Osteoarthritis, Peripheral edema, Pruritis, Recurrent UTI, Small vessel disease, cerebrovascular, Tuberculosis, Type II or unspecified type diabetes mellitus without mention of complication, not stated as uncontrolled, Unspecified sleep apnea, and Urgency incontinence.  Past Surgical History:  has a past surgical history that includes Cystocele repair; Rectocele repair; Total knee arthroplasty; and Hysterectomy.    Assessment  Assessment: 89 yo admitted 24 for UTI/AMS. Son here and reports pt with recent 7 week stay at SNF where she never made it back to her baseline of CGA for ambulation with RW; pt went on to state that pt has been home for approx 30 days and only assisted to w/c by him daily and is currently dependent for ADLs. Son hopeful to take pt home at discharge and we discussed possibility of getting michelle lift for home if he gets to a point he needs one; son also stated he has been looking into increased aide assist at home. Pt appears to be at or close to her reported fuctional baseline and has 24 hour assist at home. Will sign off for acute PT as no new PT needs identified. Recommend pt return home with 24 hour capable assist and michelle  Mobility    AM-PAC Basic Mobility - Inpatient   How much help is needed turning from your back to your side while in a flat bed without using bedrails?: A Lot  How much help is needed moving from lying on your back to sitting on the side of a flat bed without using bedrails?: A Lot  How much help is needed moving to and from a bed to a chair?: A Little  How much help is needed standing up from a chair using your arms?: A Little  How much help is needed walking in hospital room?: Total  How much help is needed climbing 3-5 steps with a railing?: Total  AM-PAC Inpatient Mobility Raw Score : 12  AM-PAC Inpatient T-Scale Score : 35.33  Mobility Inpatient CMS 0-100% Score: 68.66  Mobility Inpatient CMS G-Code Modifier : CL       Education  Patient Education  Education Given To: Patient  Education Provided: Role of Therapy  Education Method: Verbal  Barriers to Learning: Cognition;Hearing  Education Outcome: Continued education needed      Therapy Time   Individual Concurrent Group Co-treatment   Time In 1105         Time Out 1145         Minutes 40          Timed Code Treatment Minutes: 30      Total Treatment Minutes:   40       Rosemarie Dumont PT

## 2024-12-23 NOTE — PLAN OF CARE
Problem: Chronic Conditions and Co-morbidities  Goal: Patient's chronic conditions and co-morbidity symptoms are monitored and maintained or improved  Outcome: Progressing   Pt chronic symptoms are being managed during this shift   Problem: Safety - Adult  Goal: Free from fall injury  Outcome: Progressing   Pt free of falls during this shift, bed alarm on locked and low, call light within reach   Problem: Skin/Tissue Integrity  Goal: Absence of new skin breakdown  Description: 1.  Monitor for areas of redness and/or skin breakdown  2.  Assess vascular access sites hourly  3.  Every 4-6 hours minimum:  Change oxygen saturation probe site  4.  Every 4-6 hours:  If on nasal continuous positive airway pressure, respiratory therapy assess nares and determine need for appliance change or resting period.  Outcome: Progressing   Skin noted no new skin issues

## 2024-12-23 NOTE — CARE COORDINATION
Called sonBob, and left a HIPAA compliant message to call CM back to do intake for chart. Electronically signed by Joan Abdul RN on 12/23/2024 at 2:25 PM

## 2024-12-23 NOTE — PLAN OF CARE
Problem: Chronic Conditions and Co-morbidities  Goal: Patient's chronic conditions and co-morbidity symptoms are monitored and maintained or improved  12/22/2024 2141 by Marina Sanchez RN  Outcome: Progressing  Flowsheets (Taken 12/22/2024 1118 by Bell Bauer, RN)  Care Plan - Patient's Chronic Conditions and Co-Morbidity Symptoms are Monitored and Maintained or Improved: Monitor and assess patient's chronic conditions and comorbid symptoms for stability, deterioration, or improvement     Problem: Discharge Planning  Goal: Discharge to home or other facility with appropriate resources  12/22/2024 2141 by Marina Sanchez RN  Outcome: Progressing  Flowsheets (Taken 12/22/2024 1118 by Bell Bauer, RN)  Discharge to home or other facility with appropriate resources:   Identify barriers to discharge with patient and caregiver   Arrange for needed discharge resources and transportation as appropriate     Problem: Skin/Tissue Integrity  Goal: Absence of new skin breakdown  Description: 1.  Monitor for areas of redness and/or skin breakdown  2.  Assess vascular access sites hourly  3.  Every 4-6 hours minimum:  Change oxygen saturation probe site  4.  Every 4-6 hours:  If on nasal continuous positive airway pressure, respiratory therapy assess nares and determine need for appliance change or resting period.  12/22/2024 2141 by Marina Sanchez RN  Outcome: Progressing  Note: Pt repositioned every two hours. Skin monitored for new or continued breakdown.     Problem: Confusion  Goal: Confusion, delirium, dementia, or psychosis is improved or at baseline  Description: INTERVENTIONS:  1. Assess for possible contributors to thought disturbance, including medications, impaired vision or hearing, underlying metabolic abnormalities, dehydration, psychiatric diagnoses, and notify attending LIP  2. [place] high risk fall precautions, as indicated  3. Provide frequent short contacts to provide reality reorientation,

## 2024-12-23 NOTE — PROGRESS NOTES
Pt ao to self, manifest as no verbal. VSSRA. Max assist. NPO per speech, but can tolerate mouth swabs. Pain monitored via ANVPS, with frequent reassessment. Pt repositioned every two hours with use of pillow support. All fall/ safety precautions in place. NAEO. POC continues.

## 2024-12-23 NOTE — CONSULTS
Advance Care Planning      Palliative Medicine Provider (MD/NP)  Advance Care Planning (ACP) Conversation      Date of Conversation: 12/23/24  The patient and/or authorized decision maker consented to a voluntary Advance Care Planning conversation.   Individuals present for the conversation:   Son Bob    Legal Healthcare Agent(s):  Bob and Estephania are legal next of kin    ACP documents available in EMR prior to discussion:  -Portable DNR    Primary Palliative Diagnosis(es):  Dementia    Conversation Summary:  Called pt's son Bob. He reports that his mother has been needing more assistance with ADLs and mostly stays in the bed for baths and meals. She has a hospital bed at home. He said she is currently a patient of [facility]. Discussed hospice with Bob and he said he didn't think she qualified on her last evaluation, and would like her evaluated again while she's here. D/w JOSE Cho and JAMES Cole.    Resuscitation Status:  DNRCC    Outcomes / Completed Documentation:  An explanation of advance directives and their importance was provided and the following forms completed:    -Portable DNR    If new document completed, original was provided to patient and/or family member.    Copy was placed for scanning into the [facility] EMR.      I spent 12 minutes providing separately identifiable ACP services with the patient and/or surrogate decision maker in a voluntary, in-person conversation discussing the patient's wishes and goals as detailed in the above note.       EDILMA Pandey - CNP

## 2024-12-23 NOTE — CONSULTS
Comprehensive Nutrition Assessment    RECOMMENDATIONS:  PO Diet: Continue Dysphagia-Soft & Bite Sized  Nutrition Supplement: Begin Ensure +HP bid  Nutrition Education: Education/Counseling not indicated   4.   Request current weight    NUTRITION ASSESSMENT:   Nutritional summary & status: Consult for Poor intakeAppetite 5 or more days. Pt admitted from home with lethargy, fatigue, and poor po intake. No current wt available to assess for weight loss. Diet upgraded to Dysphagia-Soft and Bite Sized this am following SLP evaluation. Pt with other staff member on attempted visit. Will add Ensure supplements bid to promote adequate nutrition and request current wt. Will calculate current nutrition needs using IBW and reassess when CBW obtained.Will monitor intake adequacy and acceptance of supplement.   Admission // PMH: AMS//Cancer skin, T2DM,B12 defic, depression, HLD, HTN    MALNUTRITION ASSESSMENT  Context of Malnutrition: Acute Illness   Malnutrition Status: Insufficient data  Findings of the 6 clinical characteristics of malnutrition (Minimum of 2 out of 6 clinical characteristics is required to make the diagnosis of moderate or severe Protein Calorie Malnutrition based on AND/ASPEN Guidelines):  Energy Intake:  Mild decrease in energy intake  Weight Loss:  Unable to assess (no current weight)     Body Fat Loss:  Unable to assess (pt occupied with other discipline)     Muscle Mass Loss:  Unable to assess (pt occupied with other staff)      NUTRITION DIAGNOSIS   Inadequate oral intake related to inadequate protein-energy intake as evidenced by poor intake prior to admission    Nutrition Monitoring and Evaluation:   Food/Nutrient Intake Outcomes:  Food and Nutrient Intake, Supplement Intake  Physical Signs/Symptoms Outcomes:  Biochemical Data, Nutrition Focused Physical Findings, Weight     OBJECTIVE DATA: Significant to nutrition assessment  Nutrition Related Findings: No BM noted. No edema. Glu 204, Na  135.  Wounds: Pressure Injury, Stage I  Nutrition Goals: PO intake 50% or greater, by next RD assessment     CURRENT NUTRITION THERAPIES  ADULT DIET; Dysphagia - Soft and Bite Sized  PO Intake: Unable to assess (diet prev NPO)   PO Supplement Intake:None Ordered  Additional Sources of Calories/IVF:NS @ 50 ml/hr     COMPARATIVE STANDARDS  Energy (kcal):  9261-4684 (25-30 kcal/IBW)     Protein (g):  67-84 (1.2-1.5 gm/IBW)       Fluid (ml/day):  or per MD    ANTHROPOMETRICS  Current Height: 165.1 cm (5' 5\")  Current    not available  Admission weight:  not available    The patient will be monitored per nutrition standards of care. Consult dietitian if additional nutrition interventions are needed prior to RD reassessment.     Haris Anaya RD  [place]:  [phone]

## 2024-12-23 NOTE — CARE COORDINATION
Made referral and faxed documents to [facility]. Contact in family is Estephania Gordon, daughter. Electronically signed by Joan Abdul RN on 12/23/2024 at 4:52 PM

## 2024-12-23 NOTE — PROGRESS NOTES
Occupational Therapy  Facility/Department: [address]  Occupational Therapy Initial Assessment /Treatment/Discharge     Name: Mildred Yusuf  : 1925  MRN: [id number]  Date of Service: 2024    Discharge Recommendations:  24 hour supervision or assist, Home with nursing aide  OT Equipment Recommendations  Equipment Needed: Yes  Mobility Devices: Transport Devices  Transport Devices: Patient Mechanical Lift       Patient Diagnosis(es): The encounter diagnosis was Altered mental status, unspecified altered mental status type.  Past Medical History:  has a past medical history of Age-related macular degeneration, dry, Anemia B12 deficiency, Balance disorder, Cancer, skin, squamous cell, Chronic constipation, Colorectal polyps, Depression, Diverticulosis, Epistaxis, Hyperlipidemia, Hypertension, Low magnesium levels, Neuropathy, Osteoarthritis, Peripheral edema, Pruritis, Recurrent UTI, Small vessel disease, cerebrovascular, Tuberculosis, Type II or unspecified type diabetes mellitus without mention of complication, not stated as uncontrolled, Unspecified sleep apnea, and Urgency incontinence.  Past Surgical History:  has a past surgical history that includes Cystocele repair; Rectocele repair; Total knee arthroplasty; and Hysterectomy.           Assessment  Assessment: Pt from home with son.  Pt receives assist from son and home health.  Pt's son performed pt transfer bed to chair with min assist and stated this was about how pt has been doing at home. Son hopeful to take pt home at discharge and we discussed possibility of getting michelle lift for home if he gets to a point he needs one; son also stated he has been looking into increased aide assist at home. Pt appears to be at or close to her reported fuctional baseline and has 24 hour assist at home. Will sign off for acute OT as no new OT needs are identified. Recommend pt return home with 24 hour capable assist and michelle lift  (if son  to wipe face, seated in chair)  LE Dressing: Maximum assistance;Based on clinical judgement  Toileting:  (Pure Wick in place)     Activity Tolerance  Activity Tolerance: Patient tolerated evaluation without incident;Patient limited by fatigue;Patient limited by endurance  Bed mobility  Supine to Sit: Moderate assistance (HOB up and use of rail)  Sit to Supine: Stand by assistance (with increased time)  Transfers  Stand Step Transfers: Minimal assistance  Sit to stand: Minimal assistance  Stand to sit: Minimal assistance  Transfer Comments: Son, pt's primary care giver, transferred pt bed to chair with min assist. (son is stroke survivor and has R sided hemiplegia).  Vision  Vision: Within Functional Limits (glasses don't help)  Hearing  Hearing: Exceptions to WFL  Hearing Exceptions: Hard of hearing/hearing concerns (doesn' t use hearing aids)  Cognition  Overall Cognitive Status: Exceptions  Arousal/Alertness: Delayed responses to stimuli  Attention Span: Attends with cues to redirect  Memory: Decreased short term memory  Insights: Decreased awareness of deficits  Orientation  Orientation Level: Oriented to place;Oriented to time;Oriented to person;Disoriented to situation                  Education Given To: Patient;Family  Education Provided: Role of Therapy;Plan of Care;Transfer Training  Education Method: Verbal  Barriers to Learning: None  Education Outcome: Verbalized understanding;Demonstrated understanding            Treatment included ADL and transfer training.     AM-PAC - ADL  AM-PAC Daily Activity - Inpatient   How much help is needed for putting on and taking off regular lower body clothing?: A Lot  How much help is needed for bathing (which includes washing, rinsing, drying)?: A Lot  How much help is needed for toileting (which includes using toilet, bedpan, or urinal)?: A Lot  How much help is needed for putting on and taking off regular upper body clothing?: A Little  How much help is needed for

## 2024-12-23 NOTE — PROGRESS NOTES
Speech Language Pathology  Facility/Department:[address] TELEMETRY  Dysphagia treatment note    Name: Mildred Yusuf  : 1925  MRN: [id number]                                                     Patient Diagnosis(es):   Patient Active Problem List    Diagnosis Date Noted    Pneumonia 2022    AMS (altered mental status) 2024    Hyperglycemia 2024    Cancer, skin, squamous cell 2017    DDD (degenerative disc disease), cervical 2017    Diabetic polyneuropathy associated with type 2 diabetes mellitus (HCC) 2017    Age-related macular degeneration, dry     Low magnesium levels     Depression     Sleep apnea     B12 deficiency     Diabetic neuropathy (HCC)     Balance disorder     Constipation, chronic     Hyperlipidemia     Essential hypertension with goal blood pressure less than 150/90     Type 2 diabetes mellitus (HCC)     Diverticulitis     Exposure     Peripheral edema     Dermatophytosis of foot 2010    Dysuria 2010    Urinary incontinence 2009    Osteoarthrosis involving lower leg 2009    Contact dermatitis and other eczema, due to unspecified cause 2009    Inflamed seborrheic keratosis 2009    Actinic keratosis 2008    Benign neoplasm of skin 2008     Past Medical History:   Diagnosis Date    Age-related macular degeneration, dry     Anemia B12 deficiency     Balance disorder     Cancer, skin, squamous cell 12/10    right lower leg x 2 - having these surgically removed.    Chronic constipation     Colorectal polyps     Depression     Diverticulosis     Epistaxis     recurrent     Hyperlipidemia     Hypertension     Low magnesium levels     Neuropathy     peripheral - Dr. Rubin    Osteoarthritis     Peripheral edema     Pruritis     Dr. Echols    Recurrent UTI     Small vessel disease, cerebrovascular 3/2013    MRI    Tuberculosis     as a child    Type II or unspecified type diabetes mellitus without mention

## 2024-12-23 NOTE — CARE COORDINATION
Still awaiting hospice order so CM can send to HOC. Electronically signed by Joan Abdul RN on 12/23/2024 at 6:30 PM

## 2024-12-23 NOTE — PROGRESS NOTES
Internal Medicine Progress Note    Patient Name: Mildred Yusuf   Patient : 1925   Date: 2024   Admit Date: 2024     CC: Altered Mental Status (Brought in by squad due to altered mental status. Patient appears lethargic and oriented to self upon arrival. Normally alert and oriented x4 according to caregiver as per EMS)       Interval History     S: More alert today, oriented to self only. Remains very weak, just wants to be left alone and is refusing care.                O:  T 36.3 -155, O2 97% RA, Poor a/e to lower lobes likely positional, UO 1.5L  Labs: WBC 6.5, urine culture pending,   A/P: continue abx, adjust based on sensitivity/culture. Gentle fluid replacement. SLP determined she is NPO, aspiration risk.     ROS   Review of Systems   Unable to perform ROS: Mental status change   Psychiatric/Behavioral:  Positive for confusion and sleep disturbance.    All other systems reviewed and are negative.         Objective     I/Os:  I/O last 3 completed shifts:  In: 10 [P.O.:10]  Out:  [Urine:]      Vital Signs:  Patient Vitals for the past 8 hrs:   BP Temp Temp src Pulse Resp SpO2   24 0819 135/62 97.5 °F (36.4 °C) Axillary 75 18 97 %   24 0336 136/72 97.4 °F (36.3 °C) Axillary 64 16 97 %       Physical Exam:  Physical Exam  Vitals reviewed.   Constitutional:       Appearance: Normal appearance.   HENT:      Head: Normocephalic and atraumatic.      Nose: Nose normal.      Mouth/Throat:      Mouth: Mucous membranes are moist.      Pharynx: Oropharynx is clear.   Eyes:      Extraocular Movements: Extraocular movements intact.      Conjunctiva/sclera: Conjunctivae normal.      Pupils: Pupils are equal, round, and reactive to light.   Cardiovascular:      Rate and Rhythm: Normal rate and regular rhythm.      Pulses: Normal pulses.      Heart sounds: Normal heart sounds.   Pulmonary:      Effort: Pulmonary effort is normal.      Breath sounds: Examination of the

## 2024-12-24 LAB
ALBUMIN SERPL-MCNC: 3.1 G/DL (ref 3.4–5)
ANION GAP SERPL CALCULATED.3IONS-SCNC: 10 MMOL/L (ref 3–16)
BACTERIA UR CULT: NORMAL
BASOPHILS # BLD: 0.1 K/UL (ref 0–0.2)
BASOPHILS NFR BLD: 0.9 %
BUN SERPL-MCNC: 8 MG/DL (ref 7–20)
CALCIUM SERPL-MCNC: 9.3 MG/DL (ref 8.3–10.6)
CHLORIDE SERPL-SCNC: 102 MMOL/L (ref 99–110)
CO2 SERPL-SCNC: 22 MMOL/L (ref 21–32)
CREAT SERPL-MCNC: 0.5 MG/DL (ref 0.6–1.2)
DEPRECATED RDW RBC AUTO: 15.4 % (ref 12.4–15.4)
EOSINOPHIL # BLD: 0.1 K/UL (ref 0–0.6)
EOSINOPHIL NFR BLD: 1.7 %
GFR SERPLBLD CREATININE-BSD FMLA CKD-EPI: 84 ML/MIN/{1.73_M2}
GLUCOSE BLD-MCNC: 205 MG/DL (ref 70–99)
GLUCOSE BLD-MCNC: 230 MG/DL (ref 70–99)
GLUCOSE BLD-MCNC: 293 MG/DL (ref 70–99)
GLUCOSE SERPL-MCNC: 145 MG/DL (ref 70–99)
HCT VFR BLD AUTO: 30.9 % (ref 36–48)
HGB BLD-MCNC: 10.5 G/DL (ref 12–16)
LYMPHOCYTES # BLD: 1.3 K/UL (ref 1–5.1)
LYMPHOCYTES NFR BLD: 21.7 %
MAGNESIUM SERPL-MCNC: 1.68 MG/DL (ref 1.8–2.4)
MCH RBC QN AUTO: 32.3 PG (ref 26–34)
MCHC RBC AUTO-ENTMCNC: 34.1 G/DL (ref 31–36)
MCV RBC AUTO: 94.8 FL (ref 80–100)
MONOCYTES # BLD: 0.6 K/UL (ref 0–1.3)
MONOCYTES NFR BLD: 10.4 %
NEUTROPHILS # BLD: 4.1 K/UL (ref 1.7–7.7)
NEUTROPHILS NFR BLD: 65.3 %
PERFORMED ON: ABNORMAL
PHOSPHATE SERPL-MCNC: 2.5 MG/DL (ref 2.5–4.9)
PLATELET # BLD AUTO: 306 K/UL (ref 135–450)
PMV BLD AUTO: 6.8 FL (ref 5–10.5)
POTASSIUM SERPL-SCNC: 3.8 MMOL/L (ref 3.5–5.1)
RBC # BLD AUTO: 3.26 M/UL (ref 4–5.2)
SODIUM SERPL-SCNC: 134 MMOL/L (ref 136–145)
WBC # BLD AUTO: 6.2 K/UL (ref 4–11)

## 2024-12-24 PROCEDURE — 85025 COMPLETE CBC W/AUTO DIFF WBC: CPT

## 2024-12-24 PROCEDURE — 6360000002 HC RX W HCPCS

## 2024-12-24 PROCEDURE — 6370000000 HC RX 637 (ALT 250 FOR IP)

## 2024-12-24 PROCEDURE — 36415 COLL VENOUS BLD VENIPUNCTURE: CPT

## 2024-12-24 PROCEDURE — 80069 RENAL FUNCTION PANEL: CPT

## 2024-12-24 PROCEDURE — 2580000003 HC RX 258

## 2024-12-24 PROCEDURE — 83735 ASSAY OF MAGNESIUM: CPT

## 2024-12-24 PROCEDURE — 1200000000 HC SEMI PRIVATE

## 2024-12-24 PROCEDURE — 2500000003 HC RX 250 WO HCPCS

## 2024-12-24 RX ORDER — MAGNESIUM SULFATE IN WATER 40 MG/ML
2000 INJECTION, SOLUTION INTRAVENOUS ONCE
Status: COMPLETED | OUTPATIENT
Start: 2024-12-24 | End: 2024-12-24

## 2024-12-24 RX ADMIN — INSULIN LISPRO 1 UNITS: 100 INJECTION, SOLUTION INTRAVENOUS; SUBCUTANEOUS at 17:11

## 2024-12-24 RX ADMIN — ENOXAPARIN SODIUM 40 MG: 100 INJECTION SUBCUTANEOUS at 09:27

## 2024-12-24 RX ADMIN — Medication 1000 UNITS: at 09:26

## 2024-12-24 RX ADMIN — SODIUM CHLORIDE: 9 INJECTION, SOLUTION INTRAVENOUS at 07:51

## 2024-12-24 RX ADMIN — INSULIN LISPRO 2 UNITS: 100 INJECTION, SOLUTION INTRAVENOUS; SUBCUTANEOUS at 19:45

## 2024-12-24 RX ADMIN — ACETAMINOPHEN 650 MG: 325 TABLET ORAL at 23:09

## 2024-12-24 RX ADMIN — INSULIN LISPRO 1 UNITS: 100 INJECTION, SOLUTION INTRAVENOUS; SUBCUTANEOUS at 13:30

## 2024-12-24 RX ADMIN — POLYETHYLENE GLYCOL 3350 17 G: 17 POWDER, FOR SOLUTION ORAL at 09:26

## 2024-12-24 RX ADMIN — WATER 1000 MG: 1 INJECTION INTRAMUSCULAR; INTRAVENOUS; SUBCUTANEOUS at 23:09

## 2024-12-24 RX ADMIN — SODIUM CHLORIDE: 9 INJECTION, SOLUTION INTRAVENOUS at 17:11

## 2024-12-24 RX ADMIN — MAGNESIUM SULFATE HEPTAHYDRATE 2000 MG: 40 INJECTION, SOLUTION INTRAVENOUS at 09:25

## 2024-12-24 ASSESSMENT — PAIN SCALES - GENERAL: PAINLEVEL_OUTOF10: 5

## 2024-12-24 NOTE — CARE COORDINATION
DISCHARGE PLANNING:    Chart reviewed.    Patient is from home with her son, Bob.  Son is caregiver 24/7.    Patient admitted with AMS, UTI receiving IV ABX.  Sodium and Cr low getting replacement and IV fluids.    Plan is home with palliative following and [facility] for SN, OT and HHA.  CM spoke with son via phone and he is capable and able to care for his mother at home until she needs hospice.  Palliative is on board.  JOSE spoke with Kaci from [facility] and they are providing the palliative care.  Patient has hospital bed.    Patient will need transportation at discharge.    Electronically signed by RAIMUNDO Elam, RN Case Manager on 12/24/2024 at 2:58 PM

## 2024-12-24 NOTE — PLAN OF CARE
Problem: Discharge Planning  Goal: Discharge to home or other facility with appropriate resources  Outcome: Progressing    Plan to send home with hospice care.     Problem: Safety - Adult  Goal: Free from fall injury  12/24/2024 0216 by Finn Sanchez RN  Outcome: Progressing    AVS in room, bed alarm on.     Problem: Skin/Tissue Integrity  Goal: Absence of new skin breakdown  Outcome: Progressing    Kept patient clean and dry, turning as tolerated.

## 2024-12-24 NOTE — PLAN OF CARE
General Internal Medicine Attending    Chart and data reviewed.  Patient seen and examined, case discussed with medical resident.   Physical exam independently repeated.  Labs and imaging studies reviewed.       Agree with documentation, assessment and plan as outlined above with extensions, exceptions, clarifications, and addenda as follows.        Acute metabolic encephalopathy: POA  - Appears was lethargic and confused     Recurrent complicated UTI: POA  - Klebsiella UTI in 9/2024  - Recent Cefdinir txt          Was recently placed on cefdinir about 2 weeks ago for UTI    CX this admit: NGTD, likely sec to antibx she received prior to admission        -Possibly 2/2 UTI vs polypharmacy vs delirium in the setting/background of gradual decline and debility over the last few months . Her new baseline might not be very far from her condition now    -Much better today but weak appearing    -Cont ceftriaxone for now. Stop tramadol and Mirabegron     -Palliative care consulted             Disposition:   PT/OT: LTC placement  Son reportedly wants her Home  May consider Home with [place]      Tejas Bauer MD

## 2024-12-24 NOTE — PROGRESS NOTES
#Constipation  - daily glycolax      DVT PPx: lovenox  Diet:  ADULT DIET; Dysphagia - Soft and Bite Sized  ADULT ORAL NUTRITION SUPPLEMENT; Breakfast, Dinner; Standard High Calorie/High Protein Oral Supplement   Code status:  DNR-CC     ELOS:  Barriers to discharge:  Disposition  - Preadmission: home  - Current: 6S  - Upon discharge:TBD      Will discuss with attending physician Tejas Fairbanks MD.     Delmar Rocha MD, PGY-1  Internal Medicine Resident  Contact via [employer]

## 2024-12-24 NOTE — PROGRESS NOTES
1130:  Communication held with the [facility] Medical Director.  At this time, there is not a terminal diagnosis present according to Medicare Guidelines.  Spoke to daughter/POMADIHA Hamilton on the phone to update  and that pt will continue with Pallia Care upon discharge.   Updated JOSE Luu as well.         [facility]:  Reviewed notes, met with the patient and spoke to Palliative Care KARO De Anda.   Spoke to daughter Joy on the phone to update that message sent to [facility] Medical Director to see if pt has a terminal diagnosis.   Charge Nurse Jama updated.   CM in meeting at this time.   Will follow up once additional information is received.      Kaci Burrows RN [facility] [phone]

## 2024-12-24 NOTE — DISCHARGE SUMMARY
INTERNAL MEDICINE DEPARTMENT AT THE [facility]  DISCHARGE SUMMARY    Patient ID: Mildred Yusuf                                             Discharge Date: 12/25/2024   Patient's PCP: Mary Benitez MD                                          Discharge Physician: Delmar Rocha MD   Admit Date: 12/21/2024   Admitting Physician: Miguel Quezada MD    PROBLEMS DURING HOSPITALIZATION:  Present on Admission:   AMS (altered mental status)      DISCHARGE DIAGNOSES: Acute Encephalopathy    HPI:    Ms. Mildred Yusuf is a 89 y.o. female with a MHx significant for, T2DM, HTN, HLD, recurrent UTIs, who presented to the ED on 12/21 with altered mental status.     Patient not able to provide history. Patient son and family member at bedside to help assist with history. Starting 2 weeks ago patient began having symptoms of dysuria and confusion. It was found that patient had a UTI and patient was treated accordingly with a course of cefdinir. Patient had a diarrheal illness prior to that and son thinks that she develop UTI this way.       Son states patient got better briefly, then starting about 5 days ago patient began having significantly decreased PO intake, fatigue and endorsing visual hallucinations. Son states patient reportedly was stating that she was seeing animals around the home, speaking to dead relatives, and talking to people who are not in the room.  Patient also notably has been sleeping all day and becoming more difficult to arouse. Patient also has to be significantly encouraged to eat or drink anything for the past few days.  Patient also reportedly fell out of bed the other day and has been having increased trouble with ambulation. Son states he noticed foul-smelling urine when changing patient and due to previous episodes, suspected that she had another UTI and brought her in for further evaluation. Dnies fever, chills, nausea, vomiting, or any other sx.     Son states this is not her baseline.   Reportedly at her baseline she is alert and oriented, and able to ambulate with help. About a month ago patient returned from rehab following an admission and per son she has not been ambulating or mentating the same.     Code status documentation provided by son and patient is listed as DNR-CC.     ED Course:  On arrival to the ED, she was found to be AF, HR 71, /62, 97% on RA.  Labs were significant for:  H/H 11.4/33.7  Na 128, glucose 164  VBG 7.34/52.6/28.2  UA: moderate luekocyte esterase, WBC 10-20  Imaging   CXR: Mild pulmonary vascular congestion. Nonspecific airspace disease right lower lobe possibly related to alveolar edema from vascular congestion versus pneumonitis.  Patient admitted for further management.      The following issues were addressed during hospitalization:    Encephalopathy-likely medication related delirium    Patient presented to ED with altered mental status and reported hallucinations at home after recent completion of cefdinir antibiotics for possible UTI as an outpatient. She reportedly saw animals and family members that were not there and upon arrival to [facility] was lethargic and not oriented to place/time. UA demonstrated concern for possible UTI, but no bacteria noted and urine culture did not grow any organism after 48 hours. She received empiric ceftriaxone, and fluids (initial 1L bolus, then 75mL/hr) from arrival and her tramodol was held. Within 24 hours of admission, patient was alert/oriented to person/place/time and did not voice suprapubic tenderness or dysuria. She was afebrile over hospitalization and lab work work was unremarkable (no leukocytosis, RFP and CBC at baseline). She was seen by hospice team per family request 12/24 and determined not a candidate for hospice placement. She was medically cleared for discharge 12/25 with presumptive diagnosis of tramodol associated delirium.     The following medication changes were made on discharge:     -STOP taking

## 2024-12-25 VITALS
RESPIRATION RATE: 16 BRPM | SYSTOLIC BLOOD PRESSURE: 133 MMHG | BODY MASS INDEX: 21.66 KG/M2 | DIASTOLIC BLOOD PRESSURE: 66 MMHG | TEMPERATURE: 97.3 F | OXYGEN SATURATION: 98 % | HEART RATE: 80 BPM | HEIGHT: 65 IN | WEIGHT: 130 LBS

## 2024-12-25 LAB
ALBUMIN SERPL-MCNC: 2.9 G/DL (ref 3.4–5)
ANION GAP SERPL CALCULATED.3IONS-SCNC: 5 MMOL/L (ref 3–16)
BASOPHILS # BLD: 0.1 K/UL (ref 0–0.2)
BASOPHILS NFR BLD: 1 %
BUN SERPL-MCNC: 9 MG/DL (ref 7–20)
CALCIUM SERPL-MCNC: 9.2 MG/DL (ref 8.3–10.6)
CHLORIDE SERPL-SCNC: 102 MMOL/L (ref 99–110)
CO2 SERPL-SCNC: 25 MMOL/L (ref 21–32)
CREAT SERPL-MCNC: 0.5 MG/DL (ref 0.6–1.2)
DEPRECATED RDW RBC AUTO: 15.4 % (ref 12.4–15.4)
EOSINOPHIL # BLD: 0.1 K/UL (ref 0–0.6)
EOSINOPHIL NFR BLD: 2.7 %
GFR SERPLBLD CREATININE-BSD FMLA CKD-EPI: 84 ML/MIN/{1.73_M2}
GLUCOSE BLD-MCNC: 206 MG/DL (ref 70–99)
GLUCOSE BLD-MCNC: 233 MG/DL (ref 70–99)
GLUCOSE SERPL-MCNC: 206 MG/DL (ref 70–99)
HCT VFR BLD AUTO: 29.6 % (ref 36–48)
HGB BLD-MCNC: 10.1 G/DL (ref 12–16)
LYMPHOCYTES # BLD: 1.4 K/UL (ref 1–5.1)
LYMPHOCYTES NFR BLD: 26 %
MAGNESIUM SERPL-MCNC: 1.73 MG/DL (ref 1.8–2.4)
MCH RBC QN AUTO: 32.2 PG (ref 26–34)
MCHC RBC AUTO-ENTMCNC: 34.2 G/DL (ref 31–36)
MCV RBC AUTO: 94.2 FL (ref 80–100)
MONOCYTES # BLD: 0.6 K/UL (ref 0–1.3)
MONOCYTES NFR BLD: 11.4 %
NEUTROPHILS # BLD: 3.2 K/UL (ref 1.7–7.7)
NEUTROPHILS NFR BLD: 58.9 %
PERFORMED ON: ABNORMAL
PERFORMED ON: ABNORMAL
PHOSPHATE SERPL-MCNC: 2.1 MG/DL (ref 2.5–4.9)
PLATELET # BLD AUTO: 283 K/UL (ref 135–450)
PMV BLD AUTO: 6.8 FL (ref 5–10.5)
POTASSIUM SERPL-SCNC: 3.9 MMOL/L (ref 3.5–5.1)
RBC # BLD AUTO: 3.14 M/UL (ref 4–5.2)
SODIUM SERPL-SCNC: 132 MMOL/L (ref 136–145)
WBC # BLD AUTO: 5.5 K/UL (ref 4–11)

## 2024-12-25 PROCEDURE — 6370000000 HC RX 637 (ALT 250 FOR IP)

## 2024-12-25 PROCEDURE — 2500000003 HC RX 250 WO HCPCS

## 2024-12-25 PROCEDURE — 36415 COLL VENOUS BLD VENIPUNCTURE: CPT

## 2024-12-25 PROCEDURE — 6360000002 HC RX W HCPCS

## 2024-12-25 PROCEDURE — 83735 ASSAY OF MAGNESIUM: CPT

## 2024-12-25 PROCEDURE — 85025 COMPLETE CBC W/AUTO DIFF WBC: CPT

## 2024-12-25 PROCEDURE — 80069 RENAL FUNCTION PANEL: CPT

## 2024-12-25 RX ORDER — MAGNESIUM SULFATE IN WATER 40 MG/ML
2000 INJECTION, SOLUTION INTRAVENOUS ONCE
Status: COMPLETED | OUTPATIENT
Start: 2024-12-25 | End: 2024-12-25

## 2024-12-25 RX ADMIN — ENOXAPARIN SODIUM 40 MG: 100 INJECTION SUBCUTANEOUS at 08:51

## 2024-12-25 RX ADMIN — SODIUM CHLORIDE, PRESERVATIVE FREE 10 ML: 5 INJECTION INTRAVENOUS at 08:52

## 2024-12-25 RX ADMIN — INSULIN LISPRO 1 UNITS: 100 INJECTION, SOLUTION INTRAVENOUS; SUBCUTANEOUS at 13:53

## 2024-12-25 RX ADMIN — INSULIN LISPRO 1 UNITS: 100 INJECTION, SOLUTION INTRAVENOUS; SUBCUTANEOUS at 08:40

## 2024-12-25 RX ADMIN — MAGNESIUM SULFATE HEPTAHYDRATE 2000 MG: 40 INJECTION, SOLUTION INTRAVENOUS at 08:47

## 2024-12-25 RX ADMIN — Medication 1000 UNITS: at 08:51

## 2024-12-25 RX ADMIN — POLYETHYLENE GLYCOL 3350 17 G: 17 POWDER, FOR SOLUTION ORAL at 08:51

## 2024-12-25 NOTE — CARE COORDINATION
Case Management Assessment            Discharge Note                    Date / Time of Note: 12/25/2024 2:31 PM                  Discharge Note Completed by: Precious Espinal RN    Patient Name: Mildred Yusuf   YOB: 1925  Diagnosis: Altered mental status, unspecified altered mental status type [R41.82]  AMS (altered mental status) [R41.82]   Date / Time: 12/21/2024  6:26 PM    Current PCP: Mary Benitez MD  Clinic patient: No    Hospitalization in the last 30 days: No       Advance Directives:  Code Status: DNR-CC  [place] DNR form completed and on chart: Yes    Financial:  Payor: MEDICARE / Plan: MEDICARE PART A AND B / Product Type: *No Product type* /      Pharmacy:    [facility] [id number] - [place], [place] - [address] - P [phone] - F [phone]  [address]  [place] [address]  Phone: [phone] Fax: [phone]      Assistance purchasing medications?:    Assistance provided by Case Management: None at this time    Does patient want to participate in local refill/ meds to beds program?:      Meds To Beds General Rules:  1. Can ONLY be done Monday- Friday between 8:30am-5pm  2. Prescription(s) must be in pharmacy by 3pm to be filled same day  3.Copy of patient's insurance/ prescription drug card and patient face sheet must be sent along with the prescription(s)  4. Cost of Rx cannot be added to hospital bill. If financial assistance is needed, please contact unit  or ;  or  CANNOT provide pharmacy voucher for patients co-pays  5. Patients can then  the prescription on their way out of the hospital at discharge, or pharmacy can deliver to the bedside if staff is available. (payment due at time of pick-up or delivery - cash, check, or card accepted)     Able to afford home medications/ co-pay costs: Yes    ADLS:  Current PT AM-PAC Score: 12 /24  Current OT AM-PAC Score: 14 /24    DISCHARGE Disposition: Home with  Home Health Care: [facility]     LOC at discharge: Not Applicable  ANUP Completed: No    Notification completed in HENS/PAS?:  No    IMM Completed:   Yes, Case management has presented and reviewed IMM letter #2 to the patient and/or family/ POA. Patient and/or family/POA verbalized understanding of their medicare rights and appeal process if needed. Patient and/or family/POA verbalized understanding of DC within 4 hours of signing IMM letter #2. Patient and/or family/POA has signed and placed today's date (12/25/2024) and time (1426) on IMM letter #2 on the the appropriate lines. Patient and/or family/POA, provided copy of letter and they are aware that original copy of IMM letter #2 is available prior to discharge from the paper chart on the unit.  Electronic documentation has been entered into epic for IMM letter #2 and original paper copy has been added to the paper chart at the nurses station.    IMM Letter given to Patient/Family/Significant other/Guardian/POA/by:: Precious Espinal  IMM Letter date given:: 12/25/24  IMM Letter time given:: 1426    CM provided and explained IMM and 4 hour window to Pt. They expressed understanding of their rights to appeal and that they may/will leave the hospital w/o having received the IMM letter 4 hours prior to DC. CM provided Pt a copy of IMM and placed original signed copy on the paperchart. Pt is in agreement w/DC to Home today.       Transportation:  Transportation PLAN for discharge: EMS transportation   Mode of Transport: Ambulance stretcher - BLS  Reason for medical transport: Severe muscular weakness and de-conditioned state due to AMS/UTI and requires ambulance transport due to needs assist with ambulation  Name of Transport Company: [facility]  Phone: [phone]  Time of Transport: 1700    Transport form completed: Yes    Home Care:  Home Care ordered at discharge: Yes  Home Care Agency: [facility]  Phone: [phone] Fax:

## 2024-12-25 NOTE — PROGRESS NOTES
Internal Medicine Progress Note    Patient Name: Mildred Yusuf   Patient : 1925   Date: 2024   Admit Date: 2024     CC: Altered Mental Status (Brought in by squad due to altered mental status. Patient appears lethargic and oriented to self upon arrival. Normally alert and oriented x4 according to caregiver as per EMS)       Interval History     S: Alert/oriented x3 today, just feels weak. No dysuria voiced or suprapubic tenderness.            O:  Tm 36.6 -150, O2 96% RA,   Labs: Mg 1.73. Urine culture NGTD.   A/P: pt was denied hospice, pt safe for discharge to facility vs home based on preferences.     ROS   Review of Systems   Unable to perform ROS: Mental status change   Neurological:  Positive for weakness.   Psychiatric/Behavioral:  Positive for sleep disturbance.    All other systems reviewed and are negative.         Objective     I/Os:  I/O last 3 completed shifts:  In: 480 [P.O.:480]  Out: 1000 [Urine:1000]      Vital Signs:  Patient Vitals for the past 8 hrs:   BP Temp Temp src Pulse Resp SpO2   24 0447 139/74 97.5 °F (36.4 °C) Oral 68 18 97 %       Physical Exam:  Physical Exam  Vitals and nursing note reviewed.   Constitutional:       Appearance: Normal appearance.   HENT:      Head: Normocephalic and atraumatic.      Nose: Nose normal.      Mouth/Throat:      Mouth: Mucous membranes are moist.      Pharynx: Oropharynx is clear.   Eyes:      Extraocular Movements: Extraocular movements intact.      Conjunctiva/sclera: Conjunctivae normal.      Pupils: Pupils are equal, round, and reactive to light.   Cardiovascular:      Rate and Rhythm: Normal rate and regular rhythm.      Pulses: Normal pulses.      Heart sounds: Murmur heard.      Systolic murmur is present with a grade of 2/6.   Pulmonary:      Effort: Pulmonary effort is normal.      Breath sounds: Examination of the right-lower field reveals decreased breath sounds. Examination of the left-lower field  Order Status: Completed Specimen: Urine, clean catch Updated: 12/24/24 0909     Urine Culture, Routine No growth at 18 to 36 hours    Narrative:      ORDER#: [id number]                          ORDERED BY: DYLON SILVA  SOURCE: Urine Clean Catch                  COLLECTED:  12/21/24 21:55  ANTIBIOTICS AT KEVIN.:                      RECEIVED :  12/24/24 01:04    COVID-19 & Influenza Combo [[id number]] Collected: 12/21/24 1905    Order Status: Completed Specimen: Nasopharyngeal Swab Updated: 12/21/24 2007     SARS-CoV-2 RNA, RT PCR NOT DETECTED     Comment: Not Detected results do not preclude SARS-CoV-2 infection and  should not be used as the sole basis for patient management  decisions.  Results must be combined with clinical observations,  patient history, and epidemiological information.  Testing was performed using ELAINE BRITTANY SARS-CoV-2 and Influenza A/B  nucleic acid assay. This test is a multiplex Real-Time Reverse  Transcriptase Polymerase Chain Reaction (RT-PCR)-based in vitro  diagnostic test intended for the qualitative detection of nucleic  acids from SARS-CoV-2, influenza A, and influenza B in nasopharyngeal  and nasal swab specimens for use under the FDA’s Emergency Use  Authorization (EUA) only.    Patient Fact Sheet:  https://www.fda.gov/media/993033/download  Provider Fact Sheet: https://www.fda.gov/media/841848/download  EUA: https://www.fda.gov/media/330161/download  IFU: https://www.fda.gov/media/418254/download    Methodology:  RT-PCR          Influenza A NOT DETECTED     Influenza B NOT DETECTED               Radiology:  XR CHEST PORTABLE   Final Result   1. Mild pulmonary vascular congestion   2. Nonspecific airspace disease right lower lobe possibly related to alveolar edema from vascular congestion versus pneumonitis      Electronically signed by MD Moody Tapia            Assessment & Plan   89 y.o. female who presented to the ED on 12/21 with altered mental status. Patient is currently

## 2024-12-25 NOTE — PLAN OF CARE
General Internal Medicine Attending     Chart and data reviewed.  Patient seen and examined, case discussed with medical resident.   Physical exam independently repeated.  Labs and imaging studies reviewed.         Agree with documentation, assessment and plan as outlined above with extensions, exceptions, clarifications, and addenda as follows.          Acute encephalopathy: POA  - Appears had lethargic and confusion on presentation     Recurrent complicated UTI: POA  - Klebsiella UTI in 9/2024  - Recent Cefdinir txt     Hypomagnesemia  - Replenished           Was recently placed on cefdinir about 2 weeks ago for UTI     CX this admit: NGTD, likely sec to antibx she received prior to admission       -Possibly more likely  toxic encephalopathy sec to polypharmacy in the setting/background of gradual decline and debility over the last few months.    -Also possible component of metab encephalopathy sec to resolving UTI on admission. No UTI apparently now.      -Much better today but weak appearing     -Cont ceftriaxone for now. We have stopped tramadol      -Palliative care consulted : o/p F/u planned              Disposition:   PT/OT: LTC placement  Son reportedly wants her Home  May consider Home with [facility] if pxt/son adamant against placement (Son is caregiver 24/7)  DC Home with [facility]      Tejas Bauer MD

## 2024-12-25 NOTE — PLAN OF CARE
Problem: Discharge Planning  Goal: Discharge to home or other facility with appropriate resources  Outcome: Progressing  Flowsheets (Taken 12/24/2024 0758)  Discharge to home or other facility with appropriate resources: Identify barriers to discharge with patient and caregiver     Problem: Safety - Adult  Goal: Free from fall injury  Outcome: Progressing     Problem: Skin/Tissue Integrity  Goal: Absence of new skin breakdown  Description: 1.  Monitor for areas of redness and/or skin breakdown  2.  Assess vascular access sites hourly  3.  Every 4-6 hours minimum:  Change oxygen saturation probe site  4.  Every 4-6 hours:  If on nasal continuous positive airway pressure, respiratory therapy assess nares and determine need for appliance change or resting period.  Outcome: Progressing     Problem: Confusion  Goal: Confusion, delirium, dementia, or psychosis is improved or at baseline  Description: INTERVENTIONS:  1. Assess for possible contributors to thought disturbance, including medications, impaired vision or hearing, underlying metabolic abnormalities, dehydration, psychiatric diagnoses, and notify attending LIP  2. [place] high risk fall precautions, as indicated  3. Provide frequent short contacts to provide reality reorientation, refocusing and direction  4. Decrease environmental stimuli, including noise as appropriate  5. Monitor and intervene to maintain adequate nutrition, hydration, elimination, sleep and activity  6. If unable to ensure safety without constant attention obtain sitter and review sitter guidelines with assigned personnel  7. Initiate Psychosocial CNS and Spiritual Care consult, as indicated  Outcome: Progressing

## 2024-12-26 LAB
BACTERIA BLD CULT ORG #2: NORMAL
BACTERIA BLD CULT: NORMAL